# Patient Record
Sex: FEMALE | Race: WHITE | NOT HISPANIC OR LATINO | Employment: FULL TIME | URBAN - METROPOLITAN AREA
[De-identification: names, ages, dates, MRNs, and addresses within clinical notes are randomized per-mention and may not be internally consistent; named-entity substitution may affect disease eponyms.]

---

## 2017-03-16 ENCOUNTER — MYC REFILL (OUTPATIENT)
Dept: FAMILY MEDICINE | Facility: CLINIC | Age: 27
End: 2017-03-16

## 2017-03-16 DIAGNOSIS — F43.22 ADJUSTMENT DISORDER WITH ANXIOUS MOOD: ICD-10-CM

## 2017-03-17 NOTE — TELEPHONE ENCOUNTER
Message from VPIsystemst:  Original authorizing provider: Gris Gomez MD    Angelita Hobbs would like a refill of the following medications:  clonazePAM (KLONOPIN) 0.5 MG tablet [Gris Gomez MD]    Preferred pharmacy: [facility] - [address] AT [facility] OF [address]    Comment:  Hi Dr. Gomez -  requesting a refill of my clonazepam. It has been incredibly helpful in managing my anxiety. I have found myself using its less and less frequently, which is great! It's really made a positive impact on my quality of life. Thank you! - Angelita

## 2017-03-24 RX ORDER — CLONAZEPAM 0.5 MG/1
0.25 TABLET ORAL 2 TIMES DAILY PRN
Qty: 20 TABLET | Refills: 0 | Status: SHIPPED | OUTPATIENT
Start: 2017-03-24 | End: 2017-11-19

## 2017-08-03 ENCOUNTER — OFFICE VISIT (OUTPATIENT)
Dept: OBGYN | Facility: CLINIC | Age: 27
End: 2017-08-03
Payer: COMMERCIAL

## 2017-08-03 VITALS
HEIGHT: 69 IN | DIASTOLIC BLOOD PRESSURE: 85 MMHG | WEIGHT: 182 LBS | SYSTOLIC BLOOD PRESSURE: 138 MMHG | HEART RATE: 83 BPM | BODY MASS INDEX: 26.96 KG/M2

## 2017-08-03 DIAGNOSIS — Z23 ENCOUNTER FOR IMMUNIZATION: Primary | ICD-10-CM

## 2017-08-03 PROCEDURE — 90471 IMMUNIZATION ADMIN: CPT | Performed by: OBSTETRICS & GYNECOLOGY

## 2017-08-03 PROCEDURE — 90649 4VHPV VACCINE 3 DOSE IM: CPT | Performed by: OBSTETRICS & GYNECOLOGY

## 2017-08-03 NOTE — NURSING NOTE
"Initial /85 (BP Location: Left arm, Patient Position: Chair, Cuff Size: Adult Regular)  Pulse 83  Ht 5' 9\" (1.753 m)  Wt 182 lb (82.6 kg)  Breastfeeding? No  BMI 26.88 kg/m2 Estimated body mass index is 26.88 kg/(m^2) as calculated from the following:    Height as of this encounter: 5' 9\" (1.753 m).    Weight as of this encounter: 182 lb (82.6 kg). .    Cintia Davis    Prior to injection verified patient identity using patient's name and date of birth.  Per orders of Dr. Kee, injection of gardasil/hpv given by Cintia Davis. Patient instructed to remain in clinic for 15 minutes afterwards, and to report any adverse reaction to me immediately.  Cintia Davis    "

## 2017-08-03 NOTE — MR AVS SNAPSHOT
"              After Visit Summary   8/3/2017    Angelita Hobbs    MRN: [id number]           Patient Information     Date Of Birth          1990        Visit Information        Provider Department      8/3/2017 2:15 PM Vidhya Wilburn MD [facility]        Today's Diagnoses     Encounter for immunization    -  1       Follow-ups after your visit        Who to contact     If you have questions or need follow up information about today's clinic visit or your schedule please contact [facility] directly at [phone].  Normal or non-critical lab and imaging results will be communicated to you by [employer], letter or phone within 4 business days after the clinic has received the results. If you do not hear from us within 7 days, please contact the clinic through [employer] or phone. If you have a critical or abnormal lab result, we will notify you by phone as soon as possible.  Submit refill requests through avolution or call your pharmacy and they will forward the refill request to us. Please allow 3 business days for your refill to be completed.          Additional Information About Your Visit        MyChart Information     avolution gives you secure access to your electronic health record. If you see a primary care provider, you can also send messages to your care team and make appointments. If you have questions, please call your primary care clinic.  If you do not have a primary care provider, please call [phone] and they will assist you.        Care EveryWhere ID     This is your Care EveryWhere ID. This could be used by other organizations to access your [facility] medical records  [id number]        Your Vitals Were     Pulse Height Breastfeeding? BMI (Body Mass Index)          83 5' 9\" (1.753 m) No 26.88 kg/m2         Blood Pressure from Last 3 Encounters:   08/03/17 138/85   12/22/16 144/69   10/06/16 138/89    Weight from Last 3 Encounters:   08/03/17 182 lb (82.6 kg) "   12/22/16 176 lb (79.8 kg)   10/06/16 175 lb 11.2 oz (79.7 kg)              We Performed the Following     HUMAN PAPILLOMA VIRUS VACCINE     VACCINE ADMINISTRATION, INITIAL        Primary Care Provider    Doctor Unknown, MD       No address on file        Equal Access to Services     TARIQ HARRISON : Hadii chelsie covarrubias mary Soashlie, wahattieda luqadaha, qaybta kaalmada blank, trae hamiltonin hayaan hailyleif fernandez laNikunjestuardo knutson. So [facility] [phone].    ATENCIÓN: Si habla español, tiene a tolbert disposición servicios gratuitos de asistencia lingüística. Llame al [phone].    We comply with applicable federal civil rights laws and [place] laws. We do not discriminate on the basis of race, color, national origin, age, disability sex, sexual orientation or gender identity.            Thank you!     Thank you for choosing [facility]  for your care. Our goal is always to provide you with excellent care. Hearing back from our patients is one way we can continue to improve our services. Please take a few minutes to complete the written survey that you may receive in the mail after your visit with us. Thank you!             Your Updated Medication List - Protect others around you: Learn how to safely use, store and throw away your medicines at www.disposemymeds.org.          This list is accurate as of: 8/3/17  2:23 PM.  Always use your most recent med list.                   Brand Name Dispense Instructions for use Diagnosis    clonazePAM 0.5 MG tablet    klonoPIN    20 tablet    Take 0.5 tablets (0.25 mg) by mouth 2 times daily as needed for anxiety    Adjustment disorder with anxious mood       etonogestrel 68 MG Impl    IMPLANON/NEXPLANON     1 each by Subdermal route once        loratadine 10 MG tablet    CLARITIN     Take 10 mg by mouth daily        PROBIOTIC ACIDOPHILUS BIOBEADS Caps      Take 1 tablet by mouth daily

## 2017-08-03 NOTE — PROGRESS NOTES
HPV vaccination visit.     Ms. Angelita Hobbs 26 year old G0 presents to have her HPV vaccine administered.   She has had thus far 2 of the 3 series vaccination series.   Has no complaints at this time.     Past Medical History:   Diagnosis Date     Irregular menses      Menarche     age 15     Past Surgical History:   Procedure Laterality Date     HERNIA REPAIR, INGUINAL RT/LT  1996    Right     MOUTH SURGERY  2009    [place] teeth     Current Outpatient Prescriptions   Medication     clonazePAM (KLONOPIN) 0.5 MG tablet     Probiotic Product (PROBIOTIC ACIDOPHILUS) CAPS     etonogestrel (IMPLANON/NEXPLANON) 68 MG IMPL     loratadine (CLARITIN) 10 MG tablet     No current facility-administered medications for this visit.        A/P: HPV vaccination  -- reviewed that she can continue with completing series regardless of interval of last 2 injections of the vaccine series according the advisory committee on vaccination practices  -- return PRN or annual exam    Vidhya Wilburn MD  [facility]

## 2017-11-19 ENCOUNTER — MYC REFILL (OUTPATIENT)
Dept: FAMILY MEDICINE | Facility: CLINIC | Age: 27
End: 2017-11-19

## 2017-11-19 DIAGNOSIS — F43.22 ADJUSTMENT DISORDER WITH ANXIOUS MOOD: ICD-10-CM

## 2017-11-20 NOTE — TELEPHONE ENCOUNTER
Message from Clacendixhart:  Original authorizing provider: Gris Gomez MD    Angelita Hobbs would like a refill of the following medications:  clonazePAM (KLONOPIN) 0.5 MG tablet [Gris Gomez MD]    Preferred pharmacy: [address]    Comment:  Requesting a refill of clonazepam. Has continued to work very well for me. Happy to report I am using it less and less frequently.

## 2017-11-20 NOTE — TELEPHONE ENCOUNTER
Routing refill request to provider for review/approval because:  Drug not on the [facility] refill protocol     Rosemarie DUKE Rn

## 2017-11-21 RX ORDER — CLONAZEPAM 0.5 MG/1
0.25 TABLET ORAL 2 TIMES DAILY PRN
Qty: 20 TABLET | Refills: 0 | Status: SHIPPED | OUTPATIENT
Start: 2017-11-21 | End: 2018-06-14

## 2018-05-31 ENCOUNTER — MYC REFILL (OUTPATIENT)
Dept: FAMILY MEDICINE | Facility: CLINIC | Age: 28
End: 2018-05-31

## 2018-05-31 DIAGNOSIS — F43.22 ADJUSTMENT DISORDER WITH ANXIOUS MOOD: ICD-10-CM

## 2018-06-01 RX ORDER — CLONAZEPAM 0.5 MG/1
0.25 TABLET ORAL 2 TIMES DAILY PRN
Qty: 20 TABLET | Refills: 0 | OUTPATIENT
Start: 2018-06-01

## 2018-06-01 NOTE — TELEPHONE ENCOUNTER
Message from Orca Systemshart:  Original authorizing provider: Gris Gomez MD    Agnelita Hobbs would like a refill of the following medications:  clonazePAM (KLONOPIN) 0.5 MG tablet [Gris Gomez MD]    Preferred pharmacy: [facility] DR[facility] STORE [address] [facility] [address] AT [facility] OF [address]    Comment:  Requesting refill - wondering if I have to come in for a clinic visit before I can fill it?

## 2018-06-01 NOTE — TELEPHONE ENCOUNTER
Message from BioNitrogenhart:  Original authorizing provider: Gris Gomez MD    Angelita Hobbs would like a refill of the following medications:  clonazePAM (KLONOPIN) 0.5 MG tablet [Gris Gomez MD]    Preferred pharmacy: [facility] DR[facility] STORE [address] [facility] [address] AT [facility] OF [address]    Comment:

## 2018-06-04 NOTE — TELEPHONE ENCOUNTER
Left message to call back, CSS if patient returns, call please relay message needs to be seen for Klonopin refill.    STUART Lamb

## 2018-06-14 ENCOUNTER — OFFICE VISIT (OUTPATIENT)
Dept: FAMILY MEDICINE | Facility: CLINIC | Age: 28
End: 2018-06-14
Payer: COMMERCIAL

## 2018-06-14 VITALS
HEIGHT: 69 IN | RESPIRATION RATE: 16 BRPM | BODY MASS INDEX: 26.9 KG/M2 | WEIGHT: 181.6 LBS | SYSTOLIC BLOOD PRESSURE: 122 MMHG | HEART RATE: 76 BPM | DIASTOLIC BLOOD PRESSURE: 72 MMHG

## 2018-06-14 DIAGNOSIS — F43.22 ADJUSTMENT DISORDER WITH ANXIOUS MOOD: ICD-10-CM

## 2018-06-14 PROCEDURE — 99213 OFFICE O/P EST LOW 20 MIN: CPT | Performed by: FAMILY MEDICINE

## 2018-06-14 RX ORDER — CLONAZEPAM 0.5 MG/1
0.25 TABLET ORAL 2 TIMES DAILY PRN
Qty: 20 TABLET | Refills: 0 | Status: SHIPPED | OUTPATIENT
Start: 2018-06-14 | End: 2019-03-28

## 2018-06-14 ASSESSMENT — PAIN SCALES - GENERAL: PAINLEVEL: NO PAIN (0)

## 2018-06-14 NOTE — MR AVS SNAPSHOT
"              After Visit Summary   6/14/2018    Angelita Hobbs    MRN: [id number]           Patient Information     Date Of Birth          1990        Visit Information        Provider Department      6/14/2018 3:20 PM Gris Gomez MD [facility]        Today's Diagnoses     Adjustment disorder with anxious mood           Follow-ups after your visit        Who to contact     If you have questions or need follow up information about today's clinic visit or your schedule please contact [facility] directly at [phone].  Normal or non-critical lab and imaging results will be communicated to you by Be my eyeshart, letter or phone within 4 business days after the clinic has received the results. If you do not hear from us within 7 days, please contact the clinic through [employer] or phone. If you have a critical or abnormal lab result, we will notify you by phone as soon as possible.  Submit refill requests through Sarbari or call your pharmacy and they will forward the refill request to us. Please allow 3 business days for your refill to be completed.          Additional Information About Your Visit        MyChart Information     Sar[employer] gives you secure access to your electronic health record. If you see a primary care provider, you can also send messages to your care team and make appointments. If you have questions, please call your primary care clinic.  If you do not have a primary care provider, please call [phone] and they will assist you.        Care EveryWhere ID     This is your Care EveryWhere ID. This could be used by other organizations to access your [facility] medical records  [id number]        Your Vitals Were     Pulse Respirations Height Last Period BMI (Body Mass Index)       76 16 5' 9\" (1.753 m) 05/28/2018 (Exact Date) 26.82 kg/m2        Blood Pressure from Last 3 Encounters:   06/14/18 122/72   08/03/17 138/85   12/22/16 144/69    Weight from Last " 3 Encounters:   06/14/18 181 lb 9.6 oz (82.4 kg)   08/03/17 182 lb (82.6 kg)   12/22/16 176 lb (79.8 kg)              Today, you had the following     No orders found for display         Where to get your medicines      Some of these will need a paper prescription and others can be bought over the counter.  Ask your nurse if you have questions.     Bring a paper prescription for each of these medications     clonazePAM 0.5 MG tablet          Primary Care Provider Office Phone # Fax #    Gris Radha Gomez [phone] [address] [facility] [address]        Equal Access to Services     TARIQ HARRISON : Hadii chelsie Burdick, waaxda lualbertadaha, qaybta kaalmada blank, trae knutson. So [facility] [phone].    ATENCIÓN: Si habla español, tiene a tolbert disposición servicios gratuitos de asistencia lingüística. Llame al [phone].    We comply with applicable federal civil rights laws and [place] laws. We do not discriminate on the basis of race, color, national origin, age, disability, sex, sexual orientation, or gender identity.            Thank you!     Thank you for choosing [facility]  for your care. Our goal is always to provide you with excellent care. Hearing back from our patients is one way we can continue to improve our services. Please take a few minutes to complete the written survey that you may receive in the mail after your visit with us. Thank you!             Your Updated Medication List - Protect others around you: Learn how to safely use, store and throw away your medicines at www.disposemymeds.org.          This list is accurate as of 6/14/18  4:38 PM.  Always use your most recent med list.                   Brand Name Dispense Instructions for use Diagnosis    clonazePAM 0.5 MG tablet    klonoPIN    20 tablet    Take 0.5 tablets (0.25 mg) by mouth 2 times daily as needed for anxiety    Adjustment disorder with  anxious mood       etonogestrel 68 MG Impl    IMPLANON/NEXPLANON     1 each by Subdermal route once        loratadine 10 MG tablet    CLARITIN     Take 10 mg by mouth daily        PROBIOTIC ACIDOPHILUS BIOBEADS Caps      Take 1 tablet by mouth daily

## 2018-06-14 NOTE — PROGRESS NOTES
SUBJECTIVE:   Angelita Hobbs is a 27 year old female who presents to clinic today for the following health issues:      Medication Followup of  Clonazepam    Taking Medication as prescribed: yes    Side Effects:  None    Medication Helping Symptoms:  yes     ADDITIONAL HPI: 27 year old female here for above issue. Uses clonazepam infrequently for anxiety. #20 filled 11/21/2017. Needs refills.    ROS: 10 point review of systems negative except as per HPI.    PAST MEDICAL HISTORY:  Past Medical History:   Diagnosis Date     Irregular menses      Menarche     age 15        ACTIVE MEDICAL PROBLEMS:  Patient Active Problem List   Diagnosis     Secondary amenorrhea     Scanty or infrequent menstruation     Screening examination for venereal disease     Nexplanon insertion        FAMILY HISTORY:  Family History   Problem Relation Age of Onset     Cancer Maternal Grandfather      lung     HEART DISEASE Maternal Grandfather      valve replaced     Arthritis Paternal Grandfather      Cancer Paternal Grandfather      skin     Diabetes Paternal Grandmother      Anxiety Disorder Maternal Grandmother        SOCIAL HISTORY:  Social History     Social History     Marital status: Single     Spouse name: N/A     Number of children: 0     Years of education: N/A     Occupational History      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 No     Social History Narrative       MEDICATIONS:  Current Outpatient Prescriptions   Medication Sig Dispense Refill     clonazePAM (KLONOPIN) 0.5 MG tablet Take 0.5 tablets (0.25 mg) by mouth 2 times daily as needed for anxiety 20 tablet 0     etonogestrel (IMPLANON/NEXPLANON) 68 MG IMPL 1  "each by Subdermal route once       loratadine (CLARITIN) 10 MG tablet Take 10 mg by mouth daily       Probiotic Product (PROBIOTIC ACIDOPHILUS) CAPS Take 1 tablet by mouth daily         ALLERGIES:   No Known Allergies    Problem list, Medication list, Allergies, and Medical/Social/Surgical histories reviewed in [facility] and updated as appropriate.    OBJECTIVE:                                                    VITALS: /72 (BP Location: Right arm, Patient Position: Chair, Cuff Size: Adult Regular)  Pulse 76  Resp 16  Ht 5' 9\" (1.753 m)  Wt 181 lb 9.6 oz (82.4 kg)  LMP 05/28/2018 (Exact Date)  BMI 26.82 kg/m2 Body mass index is 26.82 kg/(m^2).  GENERAL: Pleasant, well appearing female.  PSYCH: Alert and oriented x3, neatly dressed and well groomed, makes good eye contact, fluid speech - non-pressured, no psychomotor agitation or slowing, good memory, judgement and insight, no auditory or visual hallucinations, bright affect which is mood congruent.     ASSESSMENT/PLAN:                                                    1. Adjustment disorder with anxious mood  Well controlled. Refilled medication.     - clonazePAM (KLONOPIN) 0.5 MG tablet; Take 0.5 tablets (0.25 mg) by mouth 2 times daily as needed for anxiety  Dispense: 20 tablet; Refill: 0       "

## 2018-06-28 ENCOUNTER — MYC REFILL (OUTPATIENT)
Dept: FAMILY MEDICINE | Facility: CLINIC | Age: 28
End: 2018-06-28

## 2018-06-28 DIAGNOSIS — F43.22 ADJUSTMENT DISORDER WITH ANXIOUS MOOD: ICD-10-CM

## 2018-06-28 RX ORDER — CLONAZEPAM 0.5 MG/1
0.25 TABLET ORAL 2 TIMES DAILY PRN
Qty: 20 TABLET | Refills: 0 | Status: CANCELLED | OUTPATIENT
Start: 2018-06-28

## 2018-06-28 NOTE — TELEPHONE ENCOUNTER
Message from Wizerhart:  Original authorizing provider: Gris Gomez MD    Angelita Hobbs would like a refill of the following medications:  clonazePAM (KLONOPIN) 0.5 MG tablet [Gris Gomez MD]    Preferred pharmacy: [facility] [address]    Comment:

## 2019-02-21 ENCOUNTER — OFFICE VISIT (OUTPATIENT)
Dept: FAMILY MEDICINE | Facility: CLINIC | Age: 29
End: 2019-02-21
Payer: COMMERCIAL

## 2019-02-21 VITALS
TEMPERATURE: 98.4 F | WEIGHT: 177 LBS | HEIGHT: 69 IN | HEART RATE: 64 BPM | BODY MASS INDEX: 26.22 KG/M2 | SYSTOLIC BLOOD PRESSURE: 130 MMHG | DIASTOLIC BLOOD PRESSURE: 62 MMHG

## 2019-02-21 DIAGNOSIS — L08.9 INFECTED SEBACEOUS CYST: Primary | ICD-10-CM

## 2019-02-21 DIAGNOSIS — L72.3 INFECTED SEBACEOUS CYST: Primary | ICD-10-CM

## 2019-02-21 PROCEDURE — 87070 CULTURE OTHR SPECIMN AEROBIC: CPT | Performed by: FAMILY MEDICINE

## 2019-02-21 PROCEDURE — 87077 CULTURE AEROBIC IDENTIFY: CPT | Performed by: FAMILY MEDICINE

## 2019-02-21 PROCEDURE — 99213 OFFICE O/P EST LOW 20 MIN: CPT | Mod: 25 | Performed by: FAMILY MEDICINE

## 2019-02-21 PROCEDURE — 87186 SC STD MICRODIL/AGAR DIL: CPT | Performed by: FAMILY MEDICINE

## 2019-02-21 PROCEDURE — 10060 I&D ABSCESS SIMPLE/SINGLE: CPT | Performed by: FAMILY MEDICINE

## 2019-02-21 RX ORDER — DOXYCYCLINE 100 MG/1
100 CAPSULE ORAL 2 TIMES DAILY
Qty: 20 CAPSULE | Refills: 0 | Status: SHIPPED | OUTPATIENT
Start: 2019-02-21 | End: 2019-03-28

## 2019-02-21 RX ORDER — CLONAZEPAM 0.5 MG/1
0.25 TABLET ORAL 2 TIMES DAILY PRN
Qty: 20 TABLET | Refills: 0 | Status: CANCELLED | OUTPATIENT
Start: 2019-02-21

## 2019-02-21 ASSESSMENT — ENCOUNTER SYMPTOMS
SKIN CHANGES: 0
POOR WOUND HEALING: 0
TASTE DISTURBANCE: 0
TROUBLE SWALLOWING: 0
SMELL DISTURBANCE: 0
NECK MASS: 0
SINUS CONGESTION: 1
NAIL CHANGES: 0
HOARSE VOICE: 0
SORE THROAT: 0
SINUS PAIN: 0

## 2019-02-21 ASSESSMENT — MIFFLIN-ST. JEOR: SCORE: 1602.25

## 2019-02-21 ASSESSMENT — PAIN SCALES - GENERAL: PAINLEVEL: MODERATE PAIN (4)

## 2019-02-21 NOTE — PROGRESS NOTES
"  SUBJECTIVE:   Angelita Hobbs is a 28 year old female who presents to clinic today for the following health issues:    Rash  Onset: 1 week ago, Patient noticed a raised bump on right buttock. It is irritated and painful. It has grown in size.      Description:   Location: Right buttock   Character: round, raised, painful, red  Itching (Pruritis): no     Progression of Symptoms:  worsening    Accompanying Signs & Symptoms:  Fever: no   Body aches or joint pain: no   Sore throat symptoms: no   Recent cold symptoms: YES    History:   Previous similar rash: YES- 1 year ago patient had same derm problem, it started as a pea sized bump, then it got red, sore and burst. It healed on its own.     Precipitating factors:   Exposure to similar rash: no, patient works at hospital  New exposures: None   Recent travel: Patient went to [place] 1 week ago    Alleviating factors:  Advil, antibiotic cream    Therapies Tried and outcome: Advil helps with the pain.     She didn't have antibiotics for the lesion last year.  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  She has not had MRSA in the past.        Problem list and histories reviewed & adjusted, as indicated.  Additional history: as documented    BP Readings from Last 3 Encounters:   02/21/19 130/62   06/14/18 122/72   08/03/17 138/85    Wt Readings from Last 3 Encounters:   02/21/19 80.3 kg (177 lb)   06/14/18 82.4 kg (181 lb 9.6 oz)   08/03/17 82.6 kg (182 lb)                    Reviewed and updated as needed this visit by clinical staff  Tobacco  Allergies  Meds  Med Hx  Surg Hx  Fam Hx  Soc Hx      Reviewed and updated as needed this visit by Provider         ROS:  Constitutional, HEENT, cardiovascular, pulmonary, gi and gu systems are negative, except as otherwise noted.    OBJECTIVE:     /62 (BP Location: Right arm, Patient Position: Chair, Cuff Size: Adult Regular)   Pulse 64   Temp 98.4  F (36.9  C) (Oral)   Ht 1.753 m (5' 9\")   Wt 80.3 kg (177 lb)   " Breastfeeding? No   BMI 26.14 kg/m    Body mass index is 26.14 kg/m .  GENERAL: healthy, alert and no distress  MS: no gross musculoskeletal defects noted, no edema  SKIN: abscess on right buttocks   PSYCH: mentation appears normal, affect normal/bright    PROCEDURE: I and D     The area was prepped with alcohol  Injected with 0.5 ml of lidocaine with epi for anesthesia   Using an 11 blade, an incision was placed.   The abcess was then drained  Wound culture taken  Pt tolerated procedure well without complications. EBL was minimal.      Diagnostic Test Results:  none     ASSESSMENT/PLAN:     ASSESSMENT/PLAN:      ICD-10-CM    1. Infected sebaceous cyst L72.3 Wound Culture Aerobic Bacterial    L08.9 GENERAL SURG ADULT REFERRAL     doxycycline hyclate (VIBRAMYCIN) 100 MG capsule     Wound Culture Aerobic Bacterial     DRAIN SKIN ABSCESS SIMPLE/SINGLE     Will cover for MRSA as she works in the hospital.  Deep sebaceous cyst will need to be removed so will refer to general surgery for this.  Culture taken     Marcella White DO  [facility]

## 2019-02-22 ENCOUNTER — DOCUMENTATION ONLY (OUTPATIENT)
Dept: SURGERY | Facility: CLINIC | Age: 29
End: 2019-02-22

## 2019-02-22 ENCOUNTER — OFFICE VISIT (OUTPATIENT)
Dept: SURGERY | Facility: CLINIC | Age: 29
End: 2019-02-22
Attending: FAMILY MEDICINE
Payer: COMMERCIAL

## 2019-02-22 VITALS
WEIGHT: 176.3 LBS | SYSTOLIC BLOOD PRESSURE: 132 MMHG | OXYGEN SATURATION: 100 % | TEMPERATURE: 97.6 F | DIASTOLIC BLOOD PRESSURE: 82 MMHG | HEIGHT: 69 IN | BODY MASS INDEX: 26.11 KG/M2 | HEART RATE: 78 BPM

## 2019-02-22 DIAGNOSIS — M79.89 SOFT TISSUE MASS: Primary | ICD-10-CM

## 2019-02-22 ASSESSMENT — PAIN SCALES - GENERAL: PAINLEVEL: NO PAIN (0)

## 2019-02-22 ASSESSMENT — MIFFLIN-ST. JEOR: SCORE: 1599.07

## 2019-02-22 NOTE — PROGRESS NOTES
Patient is scheduled for surgery with Dr. Carmelina Barr      Spoke with: Angelita Hobbs in clinic about surgery dates    Date of Surgery: 03/07/2019 at 9:00 AM with Dr. Carmelina Barr    H&P: Patient told this writer that she was not required to schedule one per a conversation she had with Dr. Barr.      Informed patient they will need an adult : YES    Surgery packet sent: Given to patient in clinic    Additional comments: She also knows to call general surgery if she experiences any cold or flu symptoms. Surgery teaching and soap were given to in clinic on 02/22/2019. He was asked to call [phone] if he needs to reschedule and [phone] if he experiences any symptoms.

## 2019-02-22 NOTE — NURSING NOTE
Pre and Post op Patient Education/Teaching Flowsheet  Relevant Diagnosis:  Soft tissue mass  Teaching Topic:  Pre and post op teaching  Person(s) Involved in teaching:  Patient     Motivation Level:  Asks Questions:  Yes  Eager to Learn:  Yes  Cooperative:  Yes  Receptive (willing/able to accept information):  Yes  Any cultural factors/Caodaism beliefs that may influence understanding or compliance?  No    Patient/caregiver/family demonstrates understanding of the following:  Reason for the appointment, diagnosis, and treatment plan:  Yes  Patient demonstrates understanding of the following:  Pre-op bowel prep:  No  Post-op pain management recommendations (medications, ice compress, binder/athletic supporter (if applicable), etc.:  Yes  Inguinal hernia patients:  Post-op urinary retention- discussed signs/symptoms and visit to ER for [place] catheter placement and to stay in place for at least 48 hours:  NA  Restrictions:  Yes  Medications to take the day of surgery:  Per PCP  Blood thinner medications discussed and when to stop (if applicable):  Yes  Wound care:  Yes  Diabetes medication management (if applicable):  Per PCP  Which situations necessitate calling provider and whom to contact:  Discussed how to contact the hospital, nurse, and clinic scheduling staff if necessary      Date and time of surgery:  Yes  Location of surgery: [facility] [place]- 5th Floor  History and Physical and any other testing necessary prior to surgery:  Yes. Dr. Barr will update the day of surgery.  Required time line for completion of History and Physical and any pre-op testing:  Yes  Discuss need for someone to drive patient home and stay with them for 24 hours:  Yes  Pre-op showering/scrub information with Surgical Scrub:  Yes  NPO Guidelines:  NPO per Anesthesia Guidelines    Infection Prevention: Patient demonstrates understanding of the following:  Patient instructed on hand hygiene:   Yes  Surgical procedure site care will be taught and will be reviewed at the time of discharge  Signs and symptoms of infection taught:  Yes  Wound care reviewed and will be taught at the time of discharge  Central venous catheter care will be taught at the time of discharge (if applicable)    Post-op follow-up:  Instructional materials used/given/mailed:  [facility] Booklet, post op teaching sheet, Map, Soap, and arrival/location information    Surgical instructions mailed to patient

## 2019-02-22 NOTE — PATIENT INSTRUCTIONS
You met with Dr. Carmelina Barr.      Today's visit instructions:        If you have questions please contact Jitendra RN or Magnolia RN during regular clinic hours, Monday through Friday 7:30 AM - 4:00 PM, or you can contact us via [employer] at anytime.       If you have urgent needs after-hours, weekends, or holidays please call the hospital at [phone] and ask to speak with our on-call General Surgery Team.    Appointment [phone], option #1   Nurse Advice (Jitendra or Magnolia): [phone]   Surgery Scheduler (Marquita): [phone]  Fax: [phone]      After Your Mass/Lump Removal       Incision care     You may take a shower the day after surgery. Carefully wash your incision with soap and water. Do not submerge yourself in water (bath, whirlpool, hot tub, pool, lake) for 14 days after surgery.     Remove the gauze bandage 24 hours after surgery, but leave the medical tape (Steri-Strips) or glue in place. These will loosen and fall off on their own 5 to 7 days after surgery.       Always wash your hands before touching your incisions or removing bandages.     It is not unusual to form a collection of fluid or blood under your incision that may feel firm or squishy- it can take several weeks to months for your body to reabsorb it.  At times, it may even drain.  If that should happen keep the area clean with soap, water,  and cover with a clean gauze dressing. You can change this daily or as needed.     Other medicines     Wait to start aspirin or blood thinners until the day after surgery. You can continue your regular medicines at your normal time the day after surgery.     Your pain medicine may cause constipation (hard, dry stools). To help with this, take the stool softener your doctor gave you or an over-the-counter stool softener or laxative. You can stop taking this when you are no longer taking pain medicine and your bowel movements are back to normal.      For pain or discomfort     Take  the narcotic pain medicine your doctor gave you as needed and as instructed on the bottle. If you prefer to use over-the-counter medication, use acetaminophen (Tylenol) or ibuprofen (Advil, Motrin) as instructed on the box. Do not take Tylenol if it is in your narcotic pain medication.      Use an ice pack on your surgical cut (incision) for 20 minutes at a time as needed for the first 24 hours. Be sure to protect your skin by putting a cloth between the ice pack and your skin.      Activities   No driving until you feel it s safe to do so. Don t drive while taking narcotic pain medicine.      Diet   You can eat your regular meals after surgery.      Results   You should know any test results about 5 to 7 business days after surgery       When to call the doctor   Call your doctor if you have:     A fever above 101 F (38.3 C) (taken under the tongue), or a fever or chills lasting more than a day.     Redness at the incision site.     Any fluid or blood draining from the incision, especially if it smells bad.      Severe pain that doesn t improve with pain medicine.      We will call you 2 to 4 days after surgery to review this handout, answer questions and help arrange after-surgery care. If you have questions or concerns, please call [phone] during regular office hours. If you need to call after business hours, call [phone] and ask to page the surgeon on-call.

## 2019-02-22 NOTE — LETTER
2/22/2019       RE: Angelita Hobbs  [address]  [facility] [address]     Dear Colleague,    Thank you for referring your patient, Angelita Hobbs, to the [facility] at [facility]. Please see a copy of my visit note below.    General Surgery Clinic Note - New Patient Visit    NAME: Angelita Hobbs,  28 year old female    PCP: Gris Gomez  MRN:   [id number]      Ph#: [phone]  Date: Feb 22, 2019    Chief Complaint: open wound on buttock    History of Present Illness: Angelita Hobbs is a 28 year old female who presents to the clinic with an infected boil on her buttock that was drained by the PCP.  She has been on antibiotics and is getting better.     Past Medical History: History in Epic reviewed with the patient:  Past Medical History:   Diagnosis Date     Irregular menses      Menarche     age 15       Past Surgical History: History in Epic reviewed with the patient:  Past Surgical History:   Procedure Laterality Date     HERNIA REPAIR, INGUINAL RT/LT  1996    Right     MOUTH SURGERY  2009    [place] teeth       Family History: History in Epic reviewed with the patient:  Family History   Problem Relation Age of Onset     Cancer Maternal Grandfather         lung     Heart Disease Maternal Grandfather         valve replaced     Arthritis Paternal Grandfather      Cancer Paternal Grandfather         skin     Diabetes Paternal Grandmother      Anxiety Disorder Maternal Grandmother        Social History: History in Epic reviewed with the patient:  Social History     Socioeconomic History     Marital status: Single     Spouse name: Not on file     Number of children: 0     Years of education: Not on file     Highest education level: Not on file   Occupational History     Employer: STUDENT     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   Social Needs     Financial resource strain: Not on file     Food insecurity:     Worry: Not on file     " Inability: Not on file     Transportation needs:     Medical: Not on file     Non-medical: Not on file   Tobacco Use     Smoking status: Never Smoker     Smokeless tobacco: Never Used   Substance and Sexual Activity     Alcohol use: Yes     Drug use: No     Sexual activity: Yes     Partners: Male     Birth control/protection: Implant   Lifestyle     Physical activity:     Days per week: Not on file     Minutes per session: Not on file     Stress: Not on file   Relationships     Social connections:     Talks on phone: Not on file     Gets together: Not on file     Attends Worship service: Not on file     Active member of club or organization: Not on file     Attends meetings of clubs or organizations: Not on file     Relationship status: Not on file     Intimate partner violence:     Fear of current or ex partner: Not on file     Emotionally abused: Not on file     Physically abused: Not on file     Forced sexual activity: Not on file   Other Topics Concern     Parent/sibling w/ CABG, MI or angioplasty before 65F 55M? No   Social History Narrative     Not on file       Allergies:   No Known Allergies    Outpatient Medications:  Outpatient Encounter Medications as of 2/22/2019   Medication Sig Dispense Refill     clonazePAM (KLONOPIN) 0.5 MG tablet Take 0.5 tablets (0.25 mg) by mouth 2 times daily as needed for anxiety 20 tablet 0     doxycycline hyclate (VIBRAMYCIN) 100 MG capsule Take 1 capsule (100 mg) by mouth 2 times daily for 10 days 20 capsule 0     etonogestrel (IMPLANON/NEXPLANON) 68 MG IMPL 1 each by Subdermal route once       loratadine (CLARITIN) 10 MG tablet Take 10 mg by mouth as needed        No facility-administered encounter medications on file as of 2/22/2019.        ROS: 10 systems reviewed and all are negative except as above.    EXAM:  /82   Pulse 78   Temp 97.6  F (36.4  C) (Oral)   Ht 1.753 m (5' 9\")   Wt 80 kg (176 lb 4.8 oz)   SpO2 100%   BMI 26.03 kg/m     Psych: Normal " affect  Neuro: No gross focal deficits noted  Head:Normocephalic, atraumatic  Eyes: Non icteric  Neck:supple  Heart: Regular rate and rhythm  Lungs: CTAB non-labored, quiet respiration  Abdomen: soft  Extremities: drained wound on the right buttock.  No cellulitis    A/P: Angelita Hobbs is a 28 year old female with an drained infected wound on her right buttock.  We discussed that these can be connected internally to the rectal vault.  I will explore a little bit at excision to look for any connection.  Excision is recommended.  Risks, benefits and alternatives were discussed and the patient agrees to proceed.  All questions were answered.    Carmelina Barr MD FACS  Associate Professor of Surgery  Pager [phone]

## 2019-02-22 NOTE — NURSING NOTE
"Chief Complaint   Patient presents with     Consult     Infected Sebaceous Cyst, right buttock       Vitals:    02/22/19 1222   BP: 132/82   Pulse: 78   Temp: 97.6  F (36.4  C)   TempSrc: Oral   SpO2: 100%   Weight: 80 kg (176 lb 4.8 oz)   Height: 1.753 m (5' 9\")       Body mass index is 26.03 kg/m .      Luís Victoria, EMT on 2/22/2019 at 12:26 PM                          "

## 2019-02-22 NOTE — PROGRESS NOTES
General Surgery Clinic Note - New Patient Visit    NAME: Angelita Hobbs,  28 year old female    PCP: Gris Gomez  MRN:   [id number]      #: [phone]  Date: Feb 22, 2019    Chief Complaint: open wound on buttock    History of Present Illness: Angelita Hobbs is a 28 year old female who presents to the clinic with an infected boil on her buttock that was drained by the PCP.  She has been on antibiotics and is getting better.     Past Medical History: History in Epic reviewed with the patient:  Past Medical History:   Diagnosis Date     Irregular menses      Menarche     age 15       Past Surgical History: History in Epic reviewed with the patient:  Past Surgical History:   Procedure Laterality Date     HERNIA REPAIR, INGUINAL RT/LT  1996    Right     MOUTH SURGERY  2009    [place] teeth       Family History: History in Epic reviewed with the patient:  Family History   Problem Relation Age of Onset     Cancer Maternal Grandfather         lung     Heart Disease Maternal Grandfather         valve replaced     Arthritis Paternal Grandfather      Cancer Paternal Grandfather         skin     Diabetes Paternal Grandmother      Anxiety Disorder Maternal Grandmother        Social History: History in Epic reviewed with the patient:  Social History     Socioeconomic History     Marital status: Single     Spouse name: Not on file     Number of children: 0     Years of education: Not on file     Highest education level: Not on file   Occupational History     Employer: STUDENT     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   Social Needs     Financial resource strain: Not on file     Food insecurity:     Worry: Not on file     Inability: Not on file     Transportation needs:     Medical: Not on file     Non-medical: Not on file   Tobacco Use     Smoking status: Never Smoker     Smokeless tobacco: Never Used   Substance and Sexual Activity     Alcohol use: Yes     Drug use: No     Sexual activity: Yes      "Partners: Male     Birth control/protection: Implant   Lifestyle     Physical activity:     Days per week: Not on file     Minutes per session: Not on file     Stress: Not on file   Relationships     Social connections:     Talks on phone: Not on file     Gets together: Not on file     Attends Episcopalian service: Not on file     Active member of club or organization: Not on file     Attends meetings of clubs or organizations: Not on file     Relationship status: Not on file     Intimate partner violence:     Fear of current or ex partner: Not on file     Emotionally abused: Not on file     Physically abused: Not on file     Forced sexual activity: Not on file   Other Topics Concern     Parent/sibling w/ CABG, MI or angioplasty before 65F 55M? No   Social History Narrative     Not on file       Allergies:   No Known Allergies    Outpatient Medications:  Outpatient Encounter Medications as of 2/22/2019   Medication Sig Dispense Refill     clonazePAM (KLONOPIN) 0.5 MG tablet Take 0.5 tablets (0.25 mg) by mouth 2 times daily as needed for anxiety 20 tablet 0     doxycycline hyclate (VIBRAMYCIN) 100 MG capsule Take 1 capsule (100 mg) by mouth 2 times daily for 10 days 20 capsule 0     etonogestrel (IMPLANON/NEXPLANON) 68 MG IMPL 1 each by Subdermal route once       loratadine (CLARITIN) 10 MG tablet Take 10 mg by mouth as needed        No facility-administered encounter medications on file as of 2/22/2019.        ROS: 10 systems reviewed and all are negative except as above.    EXAM:  /82   Pulse 78   Temp 97.6  F (36.4  C) (Oral)   Ht 1.753 m (5' 9\")   Wt 80 kg (176 lb 4.8 oz)   SpO2 100%   BMI 26.03 kg/m    Psych: Normal affect  Neuro: No gross focal deficits noted  Head:Normocephalic, atraumatic  Eyes: Non icteric  Neck:supple  Heart: Regular rate and rhythm  Lungs: CTAB non-labored, quiet respiration  Abdomen: soft  Extremities: drained wound on the right buttock.  No cellulitis    A/P: Angelita Faby is a 28 " year old female with an drained infected wound on her right buttock.  We discussed that these can be connected internally to the rectal vault.  I will explore a little bit at excision to look for any connection.  Excision is recommended.  Risks, benefits and alternatives were discussed and the patient agrees to proceed.  All questions were answered.      Carmelina Barr MD FACS  Associate Professor of Surgery  Pager [phone]     Answers for HPI/ROS submitted by the patient on 2/21/2019   General Symptoms: No  Skin Symptoms: Yes  HENT Symptoms: Yes  EYE SYMPTOMS: No  HEART SYMPTOMS: No  LUNG SYMPTOMS: No  INTESTINAL SYMPTOMS: No  URINARY SYMPTOMS: No  GYNECOLOGIC SYMPTOMS: No  BREAST SYMPTOMS: No  SKELETAL SYMPTOMS: No  BLOOD SYMPTOMS: No  NERVOUS SYSTEM SYMPTOMS: No  MENTAL HEALTH SYMPTOMS: No  Changes in hair: No  Changes in moles/birth marks: No  Itching: No  Rashes: No  Changes in nails: No  Acne: No  Hair in places you don't want it: No  Change in facial hair: No  Warts: No  Non-healing sores: No  Scarring: No  Flaking of skin: No  Color changes of hands/feet in cold : No  Sun sensitivity: No  Skin thickening: No  Ear pain: No  Ear discharge: No  Hearing loss: No  Tinnitus: No  Nosebleeds: No  Congestion: Yes  Sinus pain: No  Trouble swallowing: No   Voice hoarseness: No  Mouth sores: No  Sore throat: No  Tooth pain: No  Gum tenderness: No  Bleeding gums: No  Change in taste: No  Change in sense of smell: No  Dry mouth: No  Hearing aid used: No  Neck lump: No

## 2019-02-23 ENCOUNTER — MYC MEDICAL ADVICE (OUTPATIENT)
Dept: FAMILY MEDICINE | Facility: CLINIC | Age: 29
End: 2019-02-23

## 2019-02-23 DIAGNOSIS — K65.1 ABSCESS OF PERITONEUM (H): Primary | ICD-10-CM

## 2019-02-23 LAB
BACTERIA SPEC CULT: ABNORMAL
Lab: ABNORMAL
SPECIMEN SOURCE: ABNORMAL

## 2019-02-24 ENCOUNTER — MYC MEDICAL ADVICE (OUTPATIENT)
Dept: FAMILY MEDICINE | Facility: CLINIC | Age: 29
End: 2019-02-24

## 2019-02-25 RX ORDER — AMOXICILLIN 500 MG/1
500 CAPSULE ORAL 3 TIMES DAILY
Qty: 30 CAPSULE | Refills: 0 | Status: SHIPPED | OUTPATIENT
Start: 2019-02-25 | End: 2019-03-28

## 2019-02-25 NOTE — TELEPHONE ENCOUNTER
Route MyChart to provider to advise please.  Would you like to change antibiotics given patient's symptoms?     MyChart sent.  Elisa Grider RN

## 2019-02-25 NOTE — TELEPHONE ENCOUNTER
This is a duplicate encounter.  She had sent another for the same issue, which has been responded to and sent to provider. Will close this one.    Elisa Grider RN

## 2019-03-06 ENCOUNTER — ANESTHESIA EVENT (OUTPATIENT)
Dept: SURGERY | Facility: AMBULATORY SURGERY CENTER | Age: 29
End: 2019-03-06

## 2019-03-07 ENCOUNTER — HOSPITAL ENCOUNTER (OUTPATIENT)
Facility: AMBULATORY SURGERY CENTER | Age: 29
End: 2019-03-07
Attending: SURGERY
Payer: COMMERCIAL

## 2019-03-07 ENCOUNTER — ANESTHESIA (OUTPATIENT)
Dept: SURGERY | Facility: AMBULATORY SURGERY CENTER | Age: 29
End: 2019-03-07

## 2019-03-07 VITALS
DIASTOLIC BLOOD PRESSURE: 70 MMHG | OXYGEN SATURATION: 98 % | WEIGHT: 176 LBS | HEART RATE: 95 BPM | RESPIRATION RATE: 12 BRPM | SYSTOLIC BLOOD PRESSURE: 125 MMHG | TEMPERATURE: 98.2 F | HEIGHT: 69 IN | BODY MASS INDEX: 26.07 KG/M2

## 2019-03-07 LAB
HCG UR QL: NEGATIVE
INTERNAL QC OK POCT: YES

## 2019-03-07 RX ORDER — ACETAMINOPHEN 325 MG/1
975 TABLET ORAL ONCE
Status: COMPLETED | OUTPATIENT
Start: 2019-03-07 | End: 2019-03-07

## 2019-03-07 RX ORDER — NALOXONE HYDROCHLORIDE 0.4 MG/ML
.1-.4 INJECTION, SOLUTION INTRAMUSCULAR; INTRAVENOUS; SUBCUTANEOUS
Status: DISCONTINUED | OUTPATIENT
Start: 2019-03-07 | End: 2019-03-08 | Stop reason: HOSPADM

## 2019-03-07 RX ORDER — ONDANSETRON 2 MG/ML
4 INJECTION INTRAMUSCULAR; INTRAVENOUS EVERY 30 MIN PRN
Status: DISCONTINUED | OUTPATIENT
Start: 2019-03-07 | End: 2019-03-08 | Stop reason: HOSPADM

## 2019-03-07 RX ORDER — SODIUM CHLORIDE, SODIUM LACTATE, POTASSIUM CHLORIDE, CALCIUM CHLORIDE 600; 310; 30; 20 MG/100ML; MG/100ML; MG/100ML; MG/100ML
INJECTION, SOLUTION INTRAVENOUS CONTINUOUS
Status: DISCONTINUED | OUTPATIENT
Start: 2019-03-07 | End: 2019-03-07 | Stop reason: HOSPADM

## 2019-03-07 RX ORDER — HYDROCODONE BITARTRATE AND ACETAMINOPHEN 5; 325 MG/1; MG/1
1 TABLET ORAL
Status: DISCONTINUED | OUTPATIENT
Start: 2019-03-07 | End: 2019-03-08 | Stop reason: HOSPADM

## 2019-03-07 RX ORDER — LIDOCAINE 40 MG/G
CREAM TOPICAL
Status: DISCONTINUED | OUTPATIENT
Start: 2019-03-07 | End: 2019-03-07 | Stop reason: HOSPADM

## 2019-03-07 RX ORDER — PROPOFOL 10 MG/ML
INJECTION, EMULSION INTRAVENOUS CONTINUOUS PRN
Status: DISCONTINUED | OUTPATIENT
Start: 2019-03-07 | End: 2019-03-07

## 2019-03-07 RX ORDER — ONDANSETRON 2 MG/ML
INJECTION INTRAMUSCULAR; INTRAVENOUS PRN
Status: DISCONTINUED | OUTPATIENT
Start: 2019-03-07 | End: 2019-03-07

## 2019-03-07 RX ORDER — KETAMINE HYDROCHLORIDE 10 MG/ML
INJECTION, SOLUTION INTRAMUSCULAR; INTRAVENOUS PRN
Status: DISCONTINUED | OUTPATIENT
Start: 2019-03-07 | End: 2019-03-07

## 2019-03-07 RX ORDER — ONDANSETRON 4 MG/1
4 TABLET, ORALLY DISINTEGRATING ORAL EVERY 30 MIN PRN
Status: DISCONTINUED | OUTPATIENT
Start: 2019-03-07 | End: 2019-03-08 | Stop reason: HOSPADM

## 2019-03-07 RX ORDER — OXYCODONE HYDROCHLORIDE 5 MG/1
5 TABLET ORAL EVERY 4 HOURS PRN
Status: DISCONTINUED | OUTPATIENT
Start: 2019-03-07 | End: 2019-03-08 | Stop reason: HOSPADM

## 2019-03-07 RX ORDER — PROPOFOL 10 MG/ML
INJECTION, EMULSION INTRAVENOUS PRN
Status: DISCONTINUED | OUTPATIENT
Start: 2019-03-07 | End: 2019-03-07

## 2019-03-07 RX ORDER — GABAPENTIN 300 MG/1
300 CAPSULE ORAL ONCE
Status: COMPLETED | OUTPATIENT
Start: 2019-03-07 | End: 2019-03-07

## 2019-03-07 RX ORDER — MEPERIDINE HYDROCHLORIDE 25 MG/ML
12.5 INJECTION INTRAMUSCULAR; INTRAVENOUS; SUBCUTANEOUS
Status: DISCONTINUED | OUTPATIENT
Start: 2019-03-07 | End: 2019-03-08 | Stop reason: HOSPADM

## 2019-03-07 RX ORDER — FENTANYL CITRATE 50 UG/ML
25-50 INJECTION, SOLUTION INTRAMUSCULAR; INTRAVENOUS
Status: DISCONTINUED | OUTPATIENT
Start: 2019-03-07 | End: 2019-03-07 | Stop reason: HOSPADM

## 2019-03-07 RX ORDER — KETOROLAC TROMETHAMINE 30 MG/ML
INJECTION, SOLUTION INTRAMUSCULAR; INTRAVENOUS PRN
Status: DISCONTINUED | OUTPATIENT
Start: 2019-03-07 | End: 2019-03-07

## 2019-03-07 RX ORDER — LIDOCAINE HYDROCHLORIDE 20 MG/ML
INJECTION, SOLUTION INFILTRATION; PERINEURAL PRN
Status: DISCONTINUED | OUTPATIENT
Start: 2019-03-07 | End: 2019-03-07

## 2019-03-07 RX ORDER — FENTANYL CITRATE 50 UG/ML
INJECTION, SOLUTION INTRAMUSCULAR; INTRAVENOUS PRN
Status: DISCONTINUED | OUTPATIENT
Start: 2019-03-07 | End: 2019-03-07

## 2019-03-07 RX ORDER — SODIUM CHLORIDE, SODIUM LACTATE, POTASSIUM CHLORIDE, CALCIUM CHLORIDE 600; 310; 30; 20 MG/100ML; MG/100ML; MG/100ML; MG/100ML
INJECTION, SOLUTION INTRAVENOUS CONTINUOUS
Status: DISCONTINUED | OUTPATIENT
Start: 2019-03-07 | End: 2019-03-08 | Stop reason: HOSPADM

## 2019-03-07 RX ADMIN — ACETAMINOPHEN 975 MG: 325 TABLET ORAL at 07:53

## 2019-03-07 RX ADMIN — KETAMINE HYDROCHLORIDE 20 MG: 10 INJECTION, SOLUTION INTRAMUSCULAR; INTRAVENOUS at 09:50

## 2019-03-07 RX ADMIN — PROPOFOL 20 MG: 10 INJECTION, EMULSION INTRAVENOUS at 10:08

## 2019-03-07 RX ADMIN — GABAPENTIN 300 MG: 300 CAPSULE ORAL at 07:53

## 2019-03-07 RX ADMIN — SODIUM CHLORIDE, SODIUM LACTATE, POTASSIUM CHLORIDE, CALCIUM CHLORIDE: 600; 310; 30; 20 INJECTION, SOLUTION INTRAVENOUS at 07:56

## 2019-03-07 RX ADMIN — ONDANSETRON 4 MG: 2 INJECTION INTRAMUSCULAR; INTRAVENOUS at 09:37

## 2019-03-07 RX ADMIN — KETOROLAC TROMETHAMINE 30 MG: 30 INJECTION, SOLUTION INTRAMUSCULAR; INTRAVENOUS at 10:11

## 2019-03-07 RX ADMIN — PROPOFOL 30 MG: 10 INJECTION, EMULSION INTRAVENOUS at 09:41

## 2019-03-07 RX ADMIN — FENTANYL CITRATE 50 MCG: 50 INJECTION, SOLUTION INTRAMUSCULAR; INTRAVENOUS at 09:37

## 2019-03-07 RX ADMIN — PROPOFOL 100 MCG/KG/MIN: 10 INJECTION, EMULSION INTRAVENOUS at 09:43

## 2019-03-07 RX ADMIN — LIDOCAINE HYDROCHLORIDE 30 MG: 20 INJECTION, SOLUTION INFILTRATION; PERINEURAL at 09:37

## 2019-03-07 RX ADMIN — PROPOFOL 20 MG: 10 INJECTION, EMULSION INTRAVENOUS at 09:50

## 2019-03-07 ASSESSMENT — MIFFLIN-ST. JEOR: SCORE: 1597.71

## 2019-03-07 NOTE — ANESTHESIA CARE TRANSFER NOTE
Patient: Angelita Hobbs    Procedure(s):  Excision of Right Buttocks Mass    Diagnosis: Soft Tissue Mass  Diagnosis Additional Information: No value filed.    Anesthesia Type:   No value filed.     Note:  Airway :Room Air  Patient transferred to:Phase II  Comments: VSS/WNL. Responds well.Handoff Report: Identifed the Patient, Identified the Reponsible Provider, Reviewed the pertinent medical history, Discussed the surgical course, Reviewed Intra-OP anesthesia mangement and issues during anesthesia, Set expectations for post-procedure period and Allowed opportunity for questions and acknowledgement of understanding      Vitals: (Last set prior to Anesthesia Care Transfer)    CRNA VITALS  3/7/2019 0949 - 3/7/2019 1024      3/7/2019             Pulse:  122    SpO2:  99 %    Resp Rate (set):  10                Electronically Signed By: NAIDA Obrien CRNA  March 7, 2019  10:24 AM

## 2019-03-07 NOTE — ANESTHESIA PREPROCEDURE EVALUATION
"Anesthesia Pre-Procedure Evaluation    Patient: Angelita Hobbs   MRN:     [id number] Gender:   female   Age:    28 year old :      1990        Preoperative Diagnosis: Soft Tissue Mass   Procedure(s):  Excision of Right Buttocks Mass     Past Medical History:   Diagnosis Date     Irregular menses      Menarche     age 15      Past Surgical History:   Procedure Laterality Date     HERNIA REPAIR, INGUINAL RT/LT      Right     MOUTH SURGERY  2009    [place] teeth          Anesthesia Evaluation     . Pt has had prior anesthetic.            ROS/MED HX    ENT/Pulmonary:  - neg pulmonary ROS     Neurologic:  - neg neurologic ROS     Cardiovascular:  - neg cardiovascular ROS       METS/Exercise Tolerance:     Hematologic:  - neg hematologic  ROS       Musculoskeletal:         GI/Hepatic:  - neg GI/hepatic ROS       Renal/Genitourinary:  - ROS Renal section negative       Endo:         Psychiatric:         Infectious Disease:         Malignancy:         Other:                         PHYSICAL EXAM:   Mental Status/Neuro: A/A/O   Airway:   Mallampati: II  Mouth/Opening: Full   Respiratory: Auscultation: CTAB      CV: Rhythm: Regular   Comments:                    Lab Results   Component Value Date    GLC 83 2012    HCG Negative 2019       Preop Vitals  BP Readings from Last 3 Encounters:   19 136/75   19 132/82   19 130/62    Pulse Readings from Last 3 Encounters:   19 91   19 78   19 64      Resp Readings from Last 3 Encounters:   19 15   18 16    SpO2 Readings from Last 3 Encounters:   19 100%   19 100%      Temp Readings from Last 1 Encounters:   19 36.9  C (98.5  F) (Oral)    Ht Readings from Last 1 Encounters:   19 1.753 m (5' 9\")      Wt Readings from Last 1 Encounters:   19 79.8 kg (176 lb)    Estimated body mass index is 25.99 kg/m  as calculated from the following:    Height as of this encounter: 1.753 m (5' 9\").    " Weight as of this encounter: 79.8 kg (176 lb).     LDA:  Peripheral IV 03/07/19 Right Hand (Active)   Site Assessment WDL 3/7/2019  8:05 AM   Line Status Infusing 3/7/2019  8:05 AM   Phlebitis Scale 0-->no symptoms 3/7/2019  8:05 AM   Number of days: 0            Assessment:   ASA SCORE: 1    NPO Status: > 6 hours since completed Solid Foods   Documentation: H&P complete; Preop Testing complete; Consents complete   Proceeding: Proceed without further delay  Tobacco Use:  NO Active use of Tobacco/UNKNOWN Tobacco use status     Plan:   Anes. Type:  MAC   Pre-Induction: Midazolam IV   Induction:  IV (Standard)   Airway: Native Airway   Access/Monitoring: PIV   Maintenance: Propofol; IV   Emergence: Procedure Site   Logistics: Same Day Surgery     Postop Pain/Sedation Strategy:  Standard-Options: Opioids PRN     PONV Management:  Adult Risk Factors: Female, Non-Smoker, Postop Opioids  Prevention: Propofol Infusion; Ondansetron     CONSENT: Direct conversation   Plan and risks discussed with: Patient   Blood Products: Consent Deferred (Minimal Blood Loss)                         Rayshawn Mark MD

## 2019-03-07 NOTE — DISCHARGE INSTRUCTIONS
[facility]  Home Care Following Anesthesia  For 24 hours after surgery:  1. Get plenty of rest.  A responsible adult must stay with you for at least 24 hours after you leave the surgery center.  2. Do not drive or use heavy equipment.  If you have weakness or tingling, don't drive or use heavy equipment until this feeling goes away.   3. Do not drink alcohol.   4. Avoid strenuous or risky activities.  Ask for help when climbing stairs.  5. You may feel lightheaded.  IF so, sit for a few minutes before standing.  Have someone help you get up.   6. If you have nausea (feel sick to your stomach): Drink only clear liquids such as apple juice, ginger ale, broth or 7-Up.  Rest may also help.  Be sure to drink enough fluids.  Move to a regular diet as you feel able.   7. You may have a slight fever.  Call the doctor if your fever is over 100 F (37.7 C) (taken under the tongue) or lasts longer than 24 hours.  8. You may have a dry mouth, a sore throat, muscle aches or trouble sleeping. These should go away after 24 hours.  9. Do not make important or legal decisions.               Tips for taking pain medications  To get the best pain relief possible, remember these points:    Take pain medications as directed, before pain becomes severe.    Pain medication can upset your stomach: taking it with food may help.    Constipation is a common side effect of pain medication. Drink plenty of  fluids.    Eat foods high in fiber. Take a stool softener if recommended by your doctor or pharmacist.    Do not drink alcohol, drive or operate machinery while taking pain medications.    Ask about other ways to control pain, such as with heat, ice or relaxation.    Tylenol/Acetaminophen Consumption  To help encourage the safe use of acetaminophen, the makers of TYLENOL  have lowered the maximum daily dose for single-ingredient Extra Strength TYLENOL  (acetaminophen) products sold in the [place] from 8  pills per day (4,000 mg) to 6 pills per day (3,000 mg). The dosing interval has also changed from 2 pills every 4-6 hours to 2 pills every 6 hours.    If you feel your pain relief is insufficient, you may take Tylenol/Acetaminophen in addition to your narcotic pain medication.     Be careful not to exceed 3,000 mg of Tylenol/Acetaminophen in a 24 hour period from all sources.    If you are taking extra strength Tylenol/acetaminophen (500 mg), the maximum dose is 6 tablets in 24 hours.    If you are taking regular strength acetaminophen (325 mg), the maximum dose is 9 tablets in 24 hours.    Call a doctor for any of the followin. Signs of infection (fever, growing tenderness at the surgery site, a large amount of drainage or bleeding, severe pain, foul-smelling drainage, redness, swelling).  2. It has been over 8 to 10 hours since surgery and you are still not able to urinate (pass water).  3. Headache for over 24 hours.  Your doctor is:  Dr. Carmelina Barr, General Surgery: [phone]                    Or dial [phone] and ask for the resident on call for:  General Surgery  For emergency care, call the:  [facility] [facility]:  [phone] (TTY for hearing impaired: [phone])

## 2019-03-07 NOTE — ANESTHESIA POSTPROCEDURE EVALUATION
Anesthesia POST Procedure Evaluation    Patient: Angelita Hobbs   MRN:     [id number] Gender:   female   Age:    28 year old :      1990        Preoperative Diagnosis: Soft Tissue Mass   Procedure(s):  Excision of Right Buttocks Mass   Postop Comments: No value filed.       Anesthesia Type:  MAC    Reportable Event: NO     PAIN: Uncomplicated   Sign Out status: Comfortable, Well controlled pain     PONV: No PONV   Sign Out status:  No Nausea or Vomiting     Neuro/Psych: Uneventful perioperative course   Sign Out Status: Preoperative baseline; Age appropriate mentation     Airway/Resp.: Uneventful perioperative course   Sign Out Status: Non labored breathing, age appropriate RR; Resp. Status within EXPECTED Parameters     CV: Uneventful perioperative course   Sign Out status: Appropriate BP and perfusion indices; Appropriate HR/Rhythm     Disposition:   Sign Out in:  Phase II  Disposition:  Home  Recovery Course: Uneventful  Follow-Up: Not required           Last Anesthesia Record Vitals:  CRNA VITALS  3/7/2019 0949 - 3/7/2019 1049      3/7/2019             Pulse:  122    SpO2:  99 %    Resp Rate (set):  10          Last PACU/Preop Vitals:  Vitals:    19 1030 19 1045 19 1100   BP: 120/70 128/72 125/70   Pulse: 100 98 95   Resp: 12 12 12   Temp:   36.8  C (98.2  F)   SpO2: 98% 98% 98%         Electronically Signed By: Rayshawn Mark MD, 2019, 1:35 PM

## 2019-03-07 NOTE — OP NOTE
Operative Note  PreOp Dx: Infected soft tissue mass  PostOp Dx: Same  Procedure:   1.Excision of soft tissue mass  2. Rectal exam under anesthesia  Surgeon: Carmelina Barr MD   Anesethesia: MAC and Local   EBL: 2ml  Comorbidities:   Patient Active Problem List   Diagnosis     Secondary amenorrhea     Scanty or infrequent menstruation     Screening examination for venereal disease     Nexplanon insertion      Indications: Angelita Hobbs is a 28 year old female  who presented with a 2cm mass on her right buttock. It had been infected and was drained by the PCP.  She has been on antibiotics and the wound is not closed, but significantly better than when I saw her in the clinic. Excision is recommended. Risks, benefits and alternatives are discussed and the patient and mom agree to proceed as planned.   Procedure: The patient was brought to the operating room and placed in a comfortable prone position. IV sedation was administered by anesthesia. All pressure points were padded. The region was prepped and draped in a sterile fashion.  The mass was readily identified. Local anesthesia was established with Marcaine and Lidocaine. A 1.5 cm incision was made with a scalpel over the area. Dissection was carried down to the mass with a combination of blunt and sharp dissection. The mass was sent to Pathology.  The base of the wound was carefully inspected to look for granulation tracts that might extend to the rectum.  No further abnormal tissue was seen.  A rectal exam was performed.  I felt no induration, bands or abnormal woody tissue suggestive of prior healed infections.  Hemostasis was assured. The wound was closed in layers. Surgical glue was applied. The patient tolerated the procedure well and was discharged from the recovery area in good condition.

## 2019-03-11 ENCOUNTER — PATIENT OUTREACH (OUTPATIENT)
Dept: SURGERY | Facility: CLINIC | Age: 29
End: 2019-03-11

## 2019-03-11 LAB — COPATH REPORT: NORMAL

## 2019-03-11 NOTE — PROGRESS NOTES
Angelita Hobbs is a patient of Dr. Carmelina Barr that underwent excision of buttock ST mass approximately 4 days ago.  Attempted to contact patient via telephone for a status update and review post op teaching.  LM on VM to call office.  Await return call.      Of note:  Pathology:  Pending  Wound:  Dermabond  Follow-up:  Routine  Restrictions:  - No activity restrictions  New medications:  None.  May use OTC analgesics.

## 2019-03-12 NOTE — PROGRESS NOTES
RN Post-Op/Post-Discharge Care Coordination Note    Spoke with Patient.    Support  Patient able to care for self independently     Health Status  Fevers/chills: Patient denies any fever or chills.  Drains (JANAK): N/A  Incisions: Patient denies any signs and symptoms of infection..  Wound closure:  Dermabond.  Reports small amount serous drainage that is resolving.    Pain: Minimal- taking Ibuprofen with good pain control  New Medications:  None    Activity/Restrictions  No restrictions    Equipment  None    Pathology reviewed with patient:  Yes    Forms/Letters  No    All of her questions were answered.  She will call this office if she has any further questions and/or concerns.  No post op appointment needed.    Pathology:  Copath Report 03/07/2019  9:59 AM 88   Patient Name: MAE GARCIA   MR#: [id number]   Specimen #: [id number]   Collected: 3/7/2019   Received: 3/7/2019   Reported: 3/11/2019 17:06   Ordering Phy(s): ANITA RIVERA     For improved result formatting, select 'View Enhanced Report Format' under    Linked Documents section.     SPECIMEN(S):   Right buttock mass     FINAL DIAGNOSIS:   RIGHT BUTTOCK MASS, EXCISION:    - Ruptured epidermal inclusion cyst      A copy of this note was routed to the primary surgeon.     Whom and When to Call  Patient acknowledges understanding of how to manage any medication changes and   when to seek medical care.     Patient advised that if after hour medical concerns arise to please call [phone] and choose option 4 to speak to the physician on call.

## 2019-03-28 ENCOUNTER — OFFICE VISIT (OUTPATIENT)
Dept: FAMILY MEDICINE | Facility: CLINIC | Age: 29
End: 2019-03-28
Payer: COMMERCIAL

## 2019-03-28 VITALS
OXYGEN SATURATION: 97 % | TEMPERATURE: 98.1 F | RESPIRATION RATE: 16 BRPM | DIASTOLIC BLOOD PRESSURE: 78 MMHG | HEART RATE: 69 BPM | BODY MASS INDEX: 26.48 KG/M2 | SYSTOLIC BLOOD PRESSURE: 136 MMHG | WEIGHT: 178.8 LBS | HEIGHT: 69 IN

## 2019-03-28 DIAGNOSIS — F43.22 ADJUSTMENT DISORDER WITH ANXIOUS MOOD: ICD-10-CM

## 2019-03-28 PROCEDURE — 99213 OFFICE O/P EST LOW 20 MIN: CPT | Performed by: FAMILY MEDICINE

## 2019-03-28 RX ORDER — CLONAZEPAM 0.5 MG/1
0.25 TABLET ORAL 2 TIMES DAILY PRN
Qty: 20 TABLET | Refills: 1 | Status: SHIPPED | OUTPATIENT
Start: 2019-03-28 | End: 2020-01-30

## 2019-03-28 ASSESSMENT — ANXIETY QUESTIONNAIRES
GAD7 TOTAL SCORE: 0
6. BECOMING EASILY ANNOYED OR IRRITABLE: NOT AT ALL
3. WORRYING TOO MUCH ABOUT DIFFERENT THINGS: NOT AT ALL
2. NOT BEING ABLE TO STOP OR CONTROL WORRYING: NOT AT ALL
5. BEING SO RESTLESS THAT IT IS HARD TO SIT STILL: NOT AT ALL
7. FEELING AFRAID AS IF SOMETHING AWFUL MIGHT HAPPEN: NOT AT ALL
IF YOU CHECKED OFF ANY PROBLEMS ON THIS QUESTIONNAIRE, HOW DIFFICULT HAVE THESE PROBLEMS MADE IT FOR YOU TO DO YOUR WORK, TAKE CARE OF THINGS AT HOME, OR GET ALONG WITH OTHER PEOPLE: NOT DIFFICULT AT ALL
1. FEELING NERVOUS, ANXIOUS, OR ON EDGE: NOT AT ALL

## 2019-03-28 ASSESSMENT — PAIN SCALES - GENERAL: PAINLEVEL: NO PAIN (0)

## 2019-03-28 ASSESSMENT — PATIENT HEALTH QUESTIONNAIRE - PHQ9
SUM OF ALL RESPONSES TO PHQ QUESTIONS 1-9: 0
5. POOR APPETITE OR OVEREATING: NOT AT ALL

## 2019-03-28 ASSESSMENT — MIFFLIN-ST. JEOR: SCORE: 1610.41

## 2019-03-28 NOTE — PATIENT INSTRUCTIONS
Our Clinic hours are:  Mondays    7:20 am - 7 pm  Tues -  Fri  7:20 am - 5 pm    Clinic Phone: [phone]    The clinic lab opens at 7:30 am Mon - Fri and appointments are required.    [facility]. [phone]  Monday  8 am - 7pm  Tues - Fri 8 am - 5:30 pm

## 2019-03-28 NOTE — PROGRESS NOTES
SUBJECTIVE:   Angelita Hobbs is a 28 year old female who presents to clinic today for the following health issues:      Anxiety Follow-Up    Status since last visit: Improved     Other associated symptoms:None    Complicating factors:   Significant life event: No   Current substance abuse: None  Depression symptoms: No  NEGRITO-7 SCORE 10/6/2016   Total Score 5       NEGRITO-7    Amount of exercise or physical activity: 4-5 days/week for an average of 45-60 minutes    Problems taking medications regularly: No    Medication side effects: none    Diet: regular (no restrictions)    ADDITIONAL HPI: 28 year old female here for above issue.     ROS: 10 point review of systems negative except as per HPI.    PAST MEDICAL HISTORY:  Past Medical History:   Diagnosis Date     Irregular menses      Menarche     age 15        ACTIVE MEDICAL PROBLEMS:  Patient Active Problem List   Diagnosis     Secondary amenorrhea     Scanty or infrequent menstruation     Screening examination for venereal disease     Nexplanon insertion        FAMILY HISTORY:  Family History   Problem Relation Age of Onset     Cancer Maternal Grandfather         lung     Heart Disease Maternal Grandfather         valve replaced     Arthritis Paternal Grandfather      Cancer Paternal Grandfather         skin     Diabetes Paternal Grandmother      Anxiety Disorder Maternal Grandmother        SOCIAL HISTORY:  Social History     Socioeconomic History     Marital status: Single     Spouse name: Not on file     Number of children: 0     Years of education: Not on file     Highest education level: Not on file   Occupational History     Employer: STUDENT     Comment: [employer]   Social Needs     Financial resource strain: Not on file     Food insecurity:     Worry: Not on file     Inability: Not on file     Transportation needs:     Medical: Not on file     Non-medical: Not on file   Tobacco Use     Smoking status: Never Smoker     Smokeless  "tobacco: Never Used   Substance and Sexual Activity     Alcohol use: Yes     Comment: socially     Drug use: No     Sexual activity: Yes     Partners: Male     Birth control/protection: Implant   Lifestyle     Physical activity:     Days per week: Not on file     Minutes per session: Not on file     Stress: Not on file   Relationships     Social connections:     Talks on phone: Not on file     Gets together: Not on file     Attends Anabaptism service: Not on file     Active member of club or organization: Not on file     Attends meetings of clubs or organizations: Not on file     Relationship status: Not on file     Intimate partner violence:     Fear of current or ex partner: Not on file     Emotionally abused: Not on file     Physically abused: Not on file     Forced sexual activity: Not on file   Other Topics Concern     Parent/sibling w/ CABG, MI or angioplasty before 65F 55M? No   Social History Narrative     Not on file       MEDICATIONS:  Current Outpatient Medications   Medication Sig Dispense Refill     clonazePAM (KLONOPIN) 0.5 MG tablet Take 0.5 tablets (0.25 mg) by mouth 2 times daily as needed for anxiety 20 tablet 1     etonogestrel (IMPLANON/NEXPLANON) 68 MG IMPL 1 each by Subdermal route once       loratadine (CLARITIN) 10 MG tablet Take 10 mg by mouth as needed          ALLERGIES:   No Known Allergies    Problem list, Medication list, Allergies, and Medical/Social/Surgical histories reviewed in [facility] and updated as appropriate.    OBJECTIVE:                                                    VITALS: /78 (BP Location: Right arm, Cuff Size: Adult Regular)   Pulse 69   Temp 98.1  F (36.7  C) (Oral)   Resp 16   Ht 1.753 m (5' 9\")   Wt 81.1 kg (178 lb 12.8 oz)   SpO2 97%   Breastfeeding? No   BMI 26.40 kg/m   Body mass index is 26.4 kg/m .  GENERAL: Pleasant, well appearing female.  PSYCH: Alert and oriented x3, neatly dressed and well groomed, makes good eye contact, fluid speech - " non-pressured, no psychomotor agitation or slowing, good memory, judgement and insight, no auditory or visual hallucinations, bright affect which is mood congruent.     ASSESSMENT/PLAN:                                                    1. Adjustment disorder with anxious mood  Has used #20 clonazepam in the last 9 months. Is out and would like refills. Uses it very sparingly but very helpful when she does need it. Refilled medications. Follow-up for re-check in 1 year.  - clonazePAM (KLONOPIN) 0.5 MG tablet; Take 0.5 tablets (0.25 mg) by mouth 2 times daily as needed for anxiety  Dispense: 20 tablet; Refill: 1

## 2019-03-29 ASSESSMENT — ANXIETY QUESTIONNAIRES: GAD7 TOTAL SCORE: 0

## 2019-04-25 ENCOUNTER — OFFICE VISIT (OUTPATIENT)
Dept: OBGYN | Facility: CLINIC | Age: 29
End: 2019-04-25
Payer: COMMERCIAL

## 2019-04-25 VITALS
RESPIRATION RATE: 16 BRPM | WEIGHT: 179.3 LBS | SYSTOLIC BLOOD PRESSURE: 134 MMHG | HEIGHT: 69 IN | TEMPERATURE: 96.8 F | DIASTOLIC BLOOD PRESSURE: 76 MMHG | HEART RATE: 91 BPM | BODY MASS INDEX: 26.56 KG/M2

## 2019-04-25 DIAGNOSIS — L68.0 HIRSUTISM: ICD-10-CM

## 2019-04-25 DIAGNOSIS — Z01.419 ENCOUNTER FOR GYNECOLOGICAL EXAMINATION WITHOUT ABNORMAL FINDING: Primary | ICD-10-CM

## 2019-04-25 LAB
FSH SERPL-ACNC: 7.2 IU/L
LH SERPL-ACNC: 14.4 IU/L
TSH SERPL DL<=0.005 MIU/L-ACNC: 1.55 MU/L (ref 0.4–4)

## 2019-04-25 PROCEDURE — 87591 N.GONORRHOEAE DNA AMP PROB: CPT | Performed by: OBSTETRICS & GYNECOLOGY

## 2019-04-25 PROCEDURE — 87491 CHLMYD TRACH DNA AMP PROBE: CPT | Performed by: OBSTETRICS & GYNECOLOGY

## 2019-04-25 PROCEDURE — 87624 HPV HI-RISK TYP POOLED RSLT: CPT | Performed by: OBSTETRICS & GYNECOLOGY

## 2019-04-25 PROCEDURE — 82627 DEHYDROEPIANDROSTERONE: CPT | Performed by: OBSTETRICS & GYNECOLOGY

## 2019-04-25 PROCEDURE — G0145 SCR C/V CYTO,THINLAYER,RESCR: HCPCS | Performed by: OBSTETRICS & GYNECOLOGY

## 2019-04-25 PROCEDURE — 36415 COLL VENOUS BLD VENIPUNCTURE: CPT | Performed by: OBSTETRICS & GYNECOLOGY

## 2019-04-25 PROCEDURE — 84443 ASSAY THYROID STIM HORMONE: CPT | Performed by: OBSTETRICS & GYNECOLOGY

## 2019-04-25 PROCEDURE — 83001 ASSAY OF GONADOTROPIN (FSH): CPT | Performed by: OBSTETRICS & GYNECOLOGY

## 2019-04-25 PROCEDURE — G0124 SCREEN C/V THIN LAYER BY MD: HCPCS | Performed by: OBSTETRICS & GYNECOLOGY

## 2019-04-25 PROCEDURE — 83002 ASSAY OF GONADOTROPIN (LH): CPT | Performed by: OBSTETRICS & GYNECOLOGY

## 2019-04-25 PROCEDURE — 99385 PREV VISIT NEW AGE 18-39: CPT | Performed by: OBSTETRICS & GYNECOLOGY

## 2019-04-25 RX ORDER — CETIRIZINE HYDROCHLORIDE 10 MG/1
10 TABLET ORAL DAILY
COMMUNITY
End: 2020-08-03

## 2019-04-25 ASSESSMENT — MIFFLIN-ST. JEOR: SCORE: 1612.68

## 2019-04-26 LAB
C TRACH DNA SPEC QL NAA+PROBE: NEGATIVE
DHEA-S SERPL-MCNC: 465 UG/DL (ref 35–430)
N GONORRHOEA DNA SPEC QL NAA+PROBE: NEGATIVE
SPECIMEN SOURCE: NORMAL
SPECIMEN SOURCE: NORMAL

## 2019-05-01 LAB
COPATH REPORT: ABNORMAL
PAP: ABNORMAL

## 2019-05-02 DIAGNOSIS — L68.0 HIRSUTISM: Primary | ICD-10-CM

## 2019-05-02 LAB
FINAL DIAGNOSIS: NORMAL
HPV HR 12 DNA CVX QL NAA+PROBE: NEGATIVE
HPV16 DNA SPEC QL NAA+PROBE: NEGATIVE
HPV18 DNA SPEC QL NAA+PROBE: NEGATIVE
SPECIMEN DESCRIPTION: NORMAL
SPECIMEN SOURCE CVX/VAG CYTO: NORMAL

## 2019-05-09 DIAGNOSIS — L68.0 HIRSUTISM: ICD-10-CM

## 2019-05-09 PROCEDURE — 83498 ASY HYDROXYPROGESTERONE 17-D: CPT | Performed by: OBSTETRICS & GYNECOLOGY

## 2019-05-09 PROCEDURE — 84403 ASSAY OF TOTAL TESTOSTERONE: CPT | Performed by: OBSTETRICS & GYNECOLOGY

## 2019-05-09 PROCEDURE — 84270 ASSAY OF SEX HORMONE GLOBUL: CPT | Performed by: OBSTETRICS & GYNECOLOGY

## 2019-05-09 PROCEDURE — 36415 COLL VENOUS BLD VENIPUNCTURE: CPT | Performed by: OBSTETRICS & GYNECOLOGY

## 2019-05-11 LAB
SHBG SERPL-SCNC: 24 NMOL/L (ref 30–135)
TESTOST FREE SERPL-MCNC: 0.87 NG/DL (ref 0.08–0.74)
TESTOST SERPL-MCNC: 41 NG/DL (ref 8–60)

## 2019-05-13 LAB — 17OHP SERPL-MCNC: 71 NG/DL

## 2019-05-15 NOTE — RESULT ENCOUNTER NOTE
Angelita   I am looking into options to deal with the increased androgens in your system.  Dejan Camejo

## 2019-11-01 DIAGNOSIS — L68.0 FEMALE HIRSUTISM: ICD-10-CM

## 2019-11-01 NOTE — TELEPHONE ENCOUNTER
Last office visit 4/25/19 with Dr. Camejo   Hirsutism workup  Spironolactone started 6/15/19  Labs ( potassium, sodium and creatinine ) are not current for RN refill.      Please review and advise on refill.  Thank you.    Naomi Nascimento   [facility]  RN

## 2019-11-02 RX ORDER — SPIRONOLACTONE 100 MG/1
100 TABLET, FILM COATED ORAL DAILY
Qty: 30 TABLET | Refills: 3 | Status: SHIPPED | OUTPATIENT
Start: 2019-11-02 | End: 2020-01-30

## 2020-01-30 ENCOUNTER — OFFICE VISIT (OUTPATIENT)
Dept: FAMILY MEDICINE | Facility: CLINIC | Age: 30
End: 2020-01-30
Payer: COMMERCIAL

## 2020-01-30 VITALS
HEART RATE: 83 BPM | RESPIRATION RATE: 16 BRPM | SYSTOLIC BLOOD PRESSURE: 128 MMHG | OXYGEN SATURATION: 98 % | TEMPERATURE: 97.8 F | WEIGHT: 174 LBS | HEIGHT: 69 IN | DIASTOLIC BLOOD PRESSURE: 78 MMHG | BODY MASS INDEX: 25.77 KG/M2

## 2020-01-30 DIAGNOSIS — L68.0 FEMALE HIRSUTISM: ICD-10-CM

## 2020-01-30 DIAGNOSIS — F43.22 ADJUSTMENT DISORDER WITH ANXIOUS MOOD: Primary | ICD-10-CM

## 2020-01-30 DIAGNOSIS — Z23 NEED FOR VACCINATION: ICD-10-CM

## 2020-01-30 PROCEDURE — 90715 TDAP VACCINE 7 YRS/> IM: CPT | Performed by: FAMILY MEDICINE

## 2020-01-30 PROCEDURE — 99214 OFFICE O/P EST MOD 30 MIN: CPT | Mod: 25 | Performed by: FAMILY MEDICINE

## 2020-01-30 PROCEDURE — 90471 IMMUNIZATION ADMIN: CPT | Performed by: FAMILY MEDICINE

## 2020-01-30 RX ORDER — SPIRONOLACTONE 100 MG/1
100 TABLET, FILM COATED ORAL DAILY
Qty: 90 TABLET | Refills: 4 | Status: SHIPPED | OUTPATIENT
Start: 2020-01-30 | End: 2021-03-09

## 2020-01-30 RX ORDER — CLONAZEPAM 0.5 MG/1
0.25 TABLET ORAL 2 TIMES DAILY PRN
Qty: 20 TABLET | Refills: 1 | Status: SHIPPED | OUTPATIENT
Start: 2020-01-30 | End: 2020-11-17

## 2020-01-30 ASSESSMENT — ANXIETY QUESTIONNAIRES
5. BEING SO RESTLESS THAT IT IS HARD TO SIT STILL: NOT AT ALL
GAD7 TOTAL SCORE: 0
7. FEELING AFRAID AS IF SOMETHING AWFUL MIGHT HAPPEN: NOT AT ALL
6. BECOMING EASILY ANNOYED OR IRRITABLE: NOT AT ALL
3. WORRYING TOO MUCH ABOUT DIFFERENT THINGS: NOT AT ALL
1. FEELING NERVOUS, ANXIOUS, OR ON EDGE: NOT AT ALL
2. NOT BEING ABLE TO STOP OR CONTROL WORRYING: NOT AT ALL

## 2020-01-30 ASSESSMENT — PATIENT HEALTH QUESTIONNAIRE - PHQ9
5. POOR APPETITE OR OVEREATING: NOT AT ALL
SUM OF ALL RESPONSES TO PHQ QUESTIONS 1-9: 0

## 2020-01-30 ASSESSMENT — MIFFLIN-ST. JEOR: SCORE: 1575.7

## 2020-01-30 ASSESSMENT — PAIN SCALES - GENERAL: PAINLEVEL: NO PAIN (0)

## 2020-01-30 NOTE — PATIENT INSTRUCTIONS
Our Clinic hours are:  Mondays    7:20 am - 7 pm  Tues -  Fri  7:20 am - 5 pm    Clinic Phone: [phone]    The clinic lab opens at 7:30 am Mon - Fri and appointments are required.    [facility]. [phone]  Monday  8 am - 7pm  Tues - Fri 8 am - 5:30 pm

## 2020-01-30 NOTE — PROGRESS NOTES
Subjective     Angelita Hobbs is a 29 year old female who presents to clinic today for the following health issues:    HPI   Anxiety Follow-Up    How are you doing with your anxiety since your last visit? Improved states she's been taking her anxiety med less and less    Are you having other symptoms that might be associated with anxiety? No    Have you had a significant life event? No     Are you feeling depressed? No    Do you have any concerns with your use of alcohol or other drugs? Yes:  socially    Social History     Tobacco Use     Smoking status: Never Smoker     Smokeless tobacco: Never Used   Substance Use Topics     Alcohol use: Yes     Comment: socially     Drug use: No     NEGRITO-7 SCORE 10/6/2016 3/28/2019 1/30/2020   Total Score 5 0 0     PHQ 10/6/2016 3/28/2019 1/30/2020   PHQ-9 Total Score 5 0 0   Q9: Thoughts of better off dead/self-harm past 2 weeks Not at all Not at all Not at all           How many servings of fruits and vegetables do you eat daily?  4 or more    On average, how many sweetened beverages do you drink each day (Examples: soda, juice, sweet tea, etc.  Do NOT count diet or artificially sweetened beverages)?   0    How many days per week do you exercise enough to make your heart beat faster? 5    How many minutes a day do you exercise enough to make your heart beat faster? 60 or more    How many days per week do you miss taking your medication? 0    ADDITIONAL HPI: 29 year old female here for above issue.      ROS: 10 point review of systems negative except as per HPI.    PAST MEDICAL HISTORY:  Past Medical History:   Diagnosis Date     Abnormal Pap smear of cervix 04/25/2019    See problem list     Irregular menses      Menarche     age 15        ACTIVE MEDICAL PROBLEMS:  Patient Active Problem List   Diagnosis     Secondary amenorrhea     Scanty or infrequent menstruation     Screening examination for venereal disease     Nexplanon insertion     Adjustment disorder with anxious mood      ASCUS of cervix with negative high risk HPV        FAMILY HISTORY:  Family History   Problem Relation Age of Onset     Cancer Maternal Grandfather         lung     Heart Disease Maternal Grandfather         valve replaced     Arthritis Paternal Grandfather      Cancer Paternal Grandfather         skin     Diabetes Paternal Grandmother      Anxiety Disorder Maternal Grandmother      Breast Cancer Other        SOCIAL HISTORY:  Social History     Socioeconomic History     Marital status: Single     Spouse name: Not on file     Number of children: 0     Years of education: Not on file     Highest education level: Not on file   Occupational History     Employer: STUDENT     Comment: [employer]   Social Needs     Financial resource strain: Not on file     Food insecurity:     Worry: Not on file     Inability: Not on file     Transportation needs:     Medical: Not on file     Non-medical: Not on file   Tobacco Use     Smoking status: Never Smoker     Smokeless tobacco: Never Used   Substance and Sexual Activity     Alcohol use: Yes     Comment: socially     Drug use: No     Sexual activity: Yes     Partners: Male     Birth control/protection: Implant   Lifestyle     Physical activity:     Days per week: Not on file     Minutes per session: Not on file     Stress: Not on file   Relationships     Social connections:     Talks on phone: Not on file     Gets together: Not on file     Attends Confucianism service: Not on file     Active member of club or organization: Not on file     Attends meetings of clubs or organizations: Not on file     Relationship status: Not on file     Intimate partner violence:     Fear of current or ex partner: Not on file     Emotionally abused: Not on file     Physically abused: Not on file     Forced sexual activity: Not on file   Other Topics Concern     Parent/sibling w/ CABG, MI or angioplasty before 65F 55M? No   Social History Narrative     Not on file  "      MEDICATIONS:  Current Outpatient Medications   Medication Sig Dispense Refill     cetirizine (ZYRTEC) 10 MG tablet Take 10 mg by mouth daily       clonazePAM (KLONOPIN) 0.5 MG tablet Take 0.5 tablets (0.25 mg) by mouth 2 times daily as needed for anxiety 20 tablet 1     etonogestrel (IMPLANON/NEXPLANON) 68 MG IMPL 1 each by Subdermal route once       spironolactone (ALDACTONE) 100 MG tablet Take 1 tablet (100 mg) by mouth daily 90 tablet 4       ALLERGIES:   No Known Allergies    Problem list, Medication list, Allergies, and Medical/Social/Surgical histories reviewed in [facility] and updated as appropriate.    OBJECTIVE:                                                    VITALS: /78 (BP Location: Right arm, Cuff Size: Adult Regular)   Pulse 83   Temp 97.8  F (36.6  C) (Tympanic)   Resp 16   Ht 1.74 m (5' 8.5\")   Wt 78.9 kg (174 lb)   LMP 01/01/2020 (Approximate)   SpO2 98%   Breastfeeding No   BMI 26.07 kg/m   Body mass index is 26.07 kg/m .  GENERAL: Pleasant, well appearing female.  PSYCH: Alert and oriented x3, neatly dressed and well groomed, makes good eye contact, fluid speech - non-pressured, no psychomotor agitation or slowing, good memory, judgement and insight, no auditory or visual hallucinations, bright affect which is mood congruent. No suicidal ideation.    ASSESSMENT/PLAN:                                                    1. Adjustment disorder with anxious mood  Stable. Refilled medication.    - clonazePAM (KLONOPIN) 0.5 MG tablet; Take 0.5 tablets (0.25 mg) by mouth 2 times daily as needed for anxiety  Dispense: 20 tablet; Refill: 1    2. Female hirsutism  Well controlled. Refilled medication.     - spironolactone (ALDACTONE) 100 MG tablet; Take 1 tablet (100 mg) by mouth daily  Dispense: 90 tablet; Refill: 4    3. Need for vaccination  - TDAP VACCINE (ADACEL) [57355.002]  - 1st  Administration  [02835]       "

## 2020-01-31 ASSESSMENT — ANXIETY QUESTIONNAIRES: GAD7 TOTAL SCORE: 0

## 2020-03-11 ENCOUNTER — E-VISIT (OUTPATIENT)
Dept: FAMILY MEDICINE | Facility: CLINIC | Age: 30
End: 2020-03-11
Payer: COMMERCIAL

## 2020-03-11 DIAGNOSIS — J02.9 SORE THROAT: Primary | ICD-10-CM

## 2020-03-11 PROCEDURE — 99207 ZZC NON-BILLABLE SERV PER CHARTING: CPT | Performed by: FAMILY MEDICINE

## 2020-03-13 ENCOUNTER — OFFICE VISIT (OUTPATIENT)
Dept: FAMILY MEDICINE | Facility: CLINIC | Age: 30
End: 2020-03-13
Payer: COMMERCIAL

## 2020-03-13 VITALS
TEMPERATURE: 97.4 F | BODY MASS INDEX: 25.77 KG/M2 | DIASTOLIC BLOOD PRESSURE: 75 MMHG | OXYGEN SATURATION: 100 % | SYSTOLIC BLOOD PRESSURE: 130 MMHG | HEART RATE: 74 BPM | WEIGHT: 172 LBS

## 2020-03-13 DIAGNOSIS — J02.8 BACTERIAL PHARYNGITIS: ICD-10-CM

## 2020-03-13 DIAGNOSIS — R07.0 THROAT PAIN: Primary | ICD-10-CM

## 2020-03-13 DIAGNOSIS — B96.89 BACTERIAL PHARYNGITIS: ICD-10-CM

## 2020-03-13 DIAGNOSIS — R53.83 FATIGUE, UNSPECIFIED TYPE: ICD-10-CM

## 2020-03-13 LAB
DEPRECATED S PYO AG THROAT QL EIA: NEGATIVE
SPECIMEN SOURCE: NORMAL
SPECIMEN SOURCE: NORMAL
STREP GROUP A PCR: NOT DETECTED

## 2020-03-13 PROCEDURE — 40001204 ZZHCL STATISTIC STREP A RAPID: Performed by: INTERNAL MEDICINE

## 2020-03-13 PROCEDURE — 99214 OFFICE O/P EST MOD 30 MIN: CPT | Performed by: INTERNAL MEDICINE

## 2020-03-13 PROCEDURE — 87651 STREP A DNA AMP PROBE: CPT | Performed by: INTERNAL MEDICINE

## 2020-03-13 RX ORDER — AMOXICILLIN 500 MG/1
500 CAPSULE ORAL 3 TIMES DAILY
Qty: 30 CAPSULE | Refills: 0 | Status: SHIPPED | OUTPATIENT
Start: 2020-03-13 | End: 2020-03-30

## 2020-03-13 NOTE — PROGRESS NOTES
Jayme Hobbs is a 29 year old female who presents to clinic today for the following health issues:    HPI   ENT Symptoms             Symptoms: cc Present Absent Comment   Fever/Chills   x    Fatigue  x     Muscle Aches   x    Eye Irritation   x    Sneezing   x    Nasal Lalo/Drg  x     Sinus Pressure/Pain  x     Loss of smell   x    Dental pain   x    Sore Throat  x     Swollen Glands  x     Ear Pain/Fullness  x     Cough  x     Wheeze   x    Chest Pain   xx    Shortness of breath   xx    Rash       Other         Symptom duration:  4 days   Symptom severity:  moderate   Treatments tried:  ibuprofen   Contacts:  works at [employer]             Patient Active Problem List   Diagnosis     Secondary amenorrhea     Scanty or infrequent menstruation     Screening examination for venereal disease     Nexplanon insertion     Adjustment disorder with anxious mood     ASCUS of cervix with negative high risk HPV     Past Surgical History:   Procedure Laterality Date     EXCISE MASS BUTTOCK(S) Right 3/7/2019    Procedure: Excision of Right Buttocks Mass;  Surgeon: Carmelina Barr MD;  Location: [facility] OR     HERNIA REPAIR, INGUINAL RT/LT  1996    Right     MOUTH SURGERY  2009    [place] teeth       Social History     Tobacco Use     Smoking status: Never Smoker     Smokeless tobacco: Never Used   Substance Use Topics     Alcohol use: Yes     Comment: socially     Family History   Problem Relation Age of Onset     Cancer Maternal Grandfather         lung     Heart Disease Maternal Grandfather         valve replaced     Arthritis Paternal Grandfather      Cancer Paternal Grandfather         skin     Diabetes Paternal Grandmother      Anxiety Disorder Maternal Grandmother      Breast Cancer Other          Current Outpatient Medications   Medication Sig Dispense Refill     amoxicillin (AMOXIL) 500 MG capsule Take 1 capsule (500 mg) by mouth 3 times daily for 10 days 30 capsule 0     cetirizine (ZYRTEC) 10 MG  tablet Take 10 mg by mouth daily       clonazePAM (KLONOPIN) 0.5 MG tablet Take 0.5 tablets (0.25 mg) by mouth 2 times daily as needed for anxiety 20 tablet 1     etonogestrel (IMPLANON/NEXPLANON) 68 MG IMPL 1 each by Subdermal route once       spironolactone (ALDACTONE) 100 MG tablet Take 1 tablet (100 mg) by mouth daily 90 tablet 4     No Known Allergies  Recent Labs   Lab Test 04/25/19  1158 12/17/12  0952   LDL  --  88   HDL  --  68   TRIG  --  56   TSH 1.55  --       BP Readings from Last 3 Encounters:   03/13/20 130/75   01/30/20 128/78   04/25/19 134/76    Wt Readings from Last 3 Encounters:   03/13/20 78 kg (172 lb)   01/30/20 78.9 kg (174 lb)   04/25/19 81.3 kg (179 lb 4.8 oz)                    Reviewed and updated as needed this visit by Provider         Review of Systems   ROS COMP: Constitutional, HEENT, cardiovascular, pulmonary, gi and gu systems are negative, except as otherwise noted.      Objective    /75 (BP Location: Right arm, Patient Position: Sitting, Cuff Size: Adult Regular)   Pulse 74   Temp 97.4  F (36.3  C) (Oral)   Wt 78 kg (172 lb)   SpO2 100%   BMI 25.77 kg/m    Body mass index is 25.77 kg/m .  Physical Exam   GENERAL: healthy, alert and no distress  EYES: Eyes grossly normal to inspection, PERRL and conjunctivae and sclerae normal  HENT: ear canals and TM's normal, nose and mouth without ulcers or lesions  HENT: large swollen adenoids with purulence.  No stridor, speaks with ease  NECK: no adenopathy, no asymmetry, masses, or scars and thyroid normal to palpation  MS: no gross musculoskeletal defects noted, no edema    Diagnostic Test Results:  Labs reviewed in Epic  Results for orders placed or performed in visit on 03/13/20   Streptococcus A Rapid Scr w Reflx to PCR     Status: None    Specimen: Throat   Result Value Ref Range    Strep Specimen Description Throat     Streptococcus Group A Rapid Screen Negative NEG^Negative           Assessment & Plan       ICD-10-CM    1.  "Throat pain  R07.0 Streptococcus A Rapid Scr w Reflx to PCR     Group A Streptococcus PCR Throat Swab     CANCELED: Mononucleosis screen   2. Fatigue, unspecified type  R53.83 CANCELED: Mononucleosis screen   3. Bacterial pharyngitis  J02.8 amoxicillin (AMOXIL) 500 MG capsule    B96.89         BMI:   Estimated body mass index is 25.77 kg/m  as calculated from the following:    Height as of 1/30/20: 1.74 m (5' 8.5\").    Weight as of this encounter: 78 kg (172 lb).           Strep screen negative.    Will treat with Abx due to physical findings and symptoms   She did decline monospot screening.   Consider monospot in future if symptoms not improve.      Amoxicillin Rx sent.   Side effects discussed and questions answered.     No follow-ups on file.    Erin Tanner MD  [facility]    "

## 2020-03-28 ENCOUNTER — MYC MEDICAL ADVICE (OUTPATIENT)
Dept: FAMILY MEDICINE | Facility: CLINIC | Age: 30
End: 2020-03-28

## 2020-03-28 DIAGNOSIS — B96.89 BACTERIAL PHARYNGITIS: ICD-10-CM

## 2020-03-28 DIAGNOSIS — J02.8 BACTERIAL PHARYNGITIS: ICD-10-CM

## 2020-03-29 ENCOUNTER — E-VISIT (OUTPATIENT)
Dept: FAMILY MEDICINE | Facility: CLINIC | Age: 30
End: 2020-03-29
Payer: COMMERCIAL

## 2020-03-29 DIAGNOSIS — J02.8 BACTERIAL PHARYNGITIS: Primary | ICD-10-CM

## 2020-03-29 DIAGNOSIS — B96.89 BACTERIAL PHARYNGITIS: Primary | ICD-10-CM

## 2020-03-29 PROCEDURE — 99421 OL DIG E/M SVC 5-10 MIN: CPT | Performed by: INTERNAL MEDICINE

## 2020-03-30 RX ORDER — AMOXICILLIN 500 MG/1
500 CAPSULE ORAL 3 TIMES DAILY
Qty: 30 CAPSULE | Refills: 1 | Status: SHIPPED | OUTPATIENT
Start: 2020-03-30 | End: 2020-08-03

## 2020-08-02 ENCOUNTER — E-VISIT (OUTPATIENT)
Dept: FAMILY MEDICINE | Facility: CLINIC | Age: 30
End: 2020-08-02
Payer: COMMERCIAL

## 2020-08-02 DIAGNOSIS — B96.89 BACTERIAL PHARYNGITIS: ICD-10-CM

## 2020-08-02 DIAGNOSIS — J02.8 BACTERIAL PHARYNGITIS: ICD-10-CM

## 2020-08-02 PROCEDURE — 99422 OL DIG E/M SVC 11-20 MIN: CPT | Performed by: INTERNAL MEDICINE

## 2020-08-03 RX ORDER — AMOXICILLIN 500 MG/1
500 CAPSULE ORAL 3 TIMES DAILY
Qty: 30 CAPSULE | Refills: 1 | Status: SHIPPED | OUTPATIENT
Start: 2020-08-03 | End: 2020-10-29

## 2020-08-18 ENCOUNTER — VIRTUAL VISIT (OUTPATIENT)
Dept: FAMILY MEDICINE | Facility: OTHER | Age: 30
End: 2020-08-18
Payer: COMMERCIAL

## 2020-08-18 PROCEDURE — 99421 OL DIG E/M SVC 5-10 MIN: CPT | Performed by: FAMILY MEDICINE

## 2020-08-18 NOTE — PROGRESS NOTES
"Date: 2020 10:52:28  Clinician: Luke Rebolledo  Clinician NPI: [id number]  Patient: Angelita Hobbs  Patient : 1990  Patient Address: [address] [place] [id number]  Patient Phone: [phone]  Visit Protocol: URI  Patient Summary:  Angelita is a 29 year old ( : 1990 ) female who initiated a Visit for COVID-19 (Coronavirus) evaluation and screening. When asked the question \"Please sign me up to receive news, health information and promotions from [employer].\", Angelita responded \"No\".    When asked when her symptoms started, Angelita reported that she does not have any symptoms.   She denies having recent facial or sinus surgery in the past 60 days.    Pertinent COVID-19 (Coronavirus) information  In the past 14 days, Angelita has worked in a congregate living setting.   She either works or volunteers as a healthcare worker or a , or works or volunteers in a healthcare facility. She provides direct patient care. 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 She lives with a healthcare worker.   Angelita has had a close contact with a laboratory-confirmed COVID-19 patient in the last 14 days. She was not exposed at her work. Additional information about contact with COVID-19 (Coronavirus) patient as reported by the patient (free text): Co-worker is symptomatic and awaiting COVID test results. Her boyfriend has tested positive. I am in close contact with her at work daily. I am leaving on a trip with family Thursday morning and trying to decide if this is safe.    Patient reported they are not living in the same household with a COVID-19 positive patient.  Patient was in an enclosed space for greater than 15 minutes with a COVID-19 patient.  Since 2019, Angelita and has not had upper respiratory infection or influenza-like " illness. Has not been diagnosed with lab-confirmed COVID-19 test   Pertinent medical history  Angelita has taken an antibiotic medication in the past month. Antibiotic details as reported by the patient (free text): Amoxicillin   Angelita does not get yeast infections when she takes antibiotics.   Angelita does not need a return to work/school note.   Weight: 175 lbs   Angelita does not smoke or use smokeless tobacco.   She denies pregnancy and denies breastfeeding. She has menstruated in the past month.   Additional information as reported by the patient (free text): Please advise on testing. I am asymptomatic, but in an age group where asymptomatic transmission is high. I am going on a trip with two people &gt;65 years old and want to take steps to protect them.   Weight: 175 lbs    MEDICATIONS: amoxicillin oral, clonazepam oral, spironolactone-hydrochlorothiazide oral, ALLERGIES: NKDA  Clinician Response:  Dear Angelita,   Based on your exposure to COVID-19 (coronavirus), we would like to test you for this virus.  1. Please call [phone] to schedule your visit. Explain that you were referred by [facility] to have a COVID-19 test. Be ready to share your [facility] visit ID number.  The following will serve as your written order for this COVID Test, ordered by me, for the indication of suspected COVID [Z20.828]: The test will be ordered in [employer], our electronic health record, after you are scheduled. It will show as ordered and authorized by Neto Velez MD.  Order: COVID-19 (coronavirus) PCR for ASYMPTOMATIC EXPOSURE testing from [facility].  If you know you have had close contact with someone who tested positive, you should be quarantined for 14 days after this exposure. You should stay in quarantine for the14 days even if the covid test is negative, the optimal time to test after exposure is 5-7 days from the exposure  Quarantine means   What should I do?  For safety, it's very important to follow these rules. Do this for 14 days after  the date you were last exposed to the virus..  Stay home and away from others. Don't go to school or anywhere else. Generally quarantine means staying home from work but there are some exceptions to this. Please contact your workplace.   No hugging, kissing or shaking hands.  Don't let anyone visit.  Cover your mouth and nose with a mask, tissue or washcloth to avoid spreading germs.  Wash your hands and face often. Use soap and water.  What are the symptoms of COVID-19?  The most common symptoms are cough, fever and trouble breathing. Less common symptoms include headache, body aches, fatigue (feeling very tired), chills, sore throat, stuffy or runny nose, diarrhea (loose poop), loss of taste or smell, belly pain, and nausea or vomiting (feeling sick to your stomach or throwing up).  After 14 days, if you have still don't have symptoms, you likely don't have this virus.  If you develop symptoms, follow these guidelines.  If you're normally healthy: Please start another [facility] visit to report your symptoms. Go to [email or web address].  If you have a serious health problem (like cancer, heart failure, an organ transplant or kidney disease): Call your specialty clinic. Let them know that you might have COVID-19.  2. When it's time for your COVID test:  Stay at least 6 feet away from others. (If someone will drive you to your test, stay in the backseat, as far away from the  as you can.)  Cover your mouth and nose with a mask, tissue or washcloth.  Go straight to the testing site. Don't make any stops on the way there or back.  Please note  Caregivers in these groups are at risk for severe illness due to COVID-19:  o People 65 years and older  o People who live in a nursing home or long-term care facility  o People with chronic disease (lung, heart, cancer, diabetes, kidney, liver, immunologic)  o People who have a weakened immune system, including those who:  Are in cancer treatment  Take medicine that weakens the immune  system, such as corticosteroids  Had a bone marrow or organ transplant  Have an immune deficiency  Have poorly controlled HIV or AIDS  Are obese (body mass index of 40 or higher)  Smoke regularly  Where can I get more information?  [facility] -- About COVID-19: [email or web address]  CDC -- What to Do If You're Sick: www.cdc.gov/coronavirus/2019-ncov/about/steps-when-sick.html  CDC -- Ending Home Isolation: www.cdc.gov/coronavirus/2019-ncov/hcp/disposition-in-home-patients.html  [facility] -- Caring for Someone: www.cdc.gov/coronavirus/2019-ncov/if-you-are-sick/care-for-someone.html  [facility] -- Interim Guidance for Hospital Discharge to Home: www.health.[facility].mn./diseases/coronavirus/hcp/hospdischarge.pdf  [facility] clinical trials (COVID-19 research studies): [email or web address].[email or web address]-clinical-trials  Below are the COVID-19 hotlines at the [facility] ([facility]). Interpreters are available.  For health questions: Call 492-392-6111 or 1-129.427.4041 (7 a.m. to 7 p.m.)  For questions about schools and childcare: Call 812-730-9431 or 1-293.154.5524 (7 a.m. to 7 p.m.)    Diagnosis: Contact with and (suspected) exposure to other viral communicable diseases  Diagnosis ICD: Z20.828

## 2020-08-19 ENCOUNTER — APPOINTMENT (OUTPATIENT)
Dept: LAB | Facility: CLINIC | Age: 30
End: 2020-08-19
Payer: COMMERCIAL

## 2020-08-19 ENCOUNTER — RESULTS ONLY (OUTPATIENT)
Dept: LAB | Age: 30
End: 2020-08-19

## 2020-08-19 DIAGNOSIS — Z20.822 SUSPECTED 2019 NOVEL CORONAVIRUS INFECTION: Primary | ICD-10-CM

## 2020-08-20 LAB
SARS-COV-2 RNA SPEC QL NAA+PROBE: NOT DETECTED
SPECIMEN SOURCE: NORMAL

## 2020-10-14 ENCOUNTER — OFFICE VISIT - HEALTHEAST (OUTPATIENT)
Dept: FAMILY MEDICINE | Facility: CLINIC | Age: 30
End: 2020-10-14

## 2020-10-14 DIAGNOSIS — Z20.822 SUSPECTED 2019 NOVEL CORONAVIRUS INFECTION: ICD-10-CM

## 2020-10-15 ENCOUNTER — RECORDS - HEALTHEAST (OUTPATIENT)
Dept: LAB | Facility: CLINIC | Age: 30
End: 2020-10-15

## 2020-10-15 LAB
SARS-COV-2 PCR COMMENT: NORMAL
SARS-COV-2 RNA SPEC QL NAA+PROBE: NEGATIVE
SARS-COV-2 VIRUS SPECIMEN SOURCE: NORMAL

## 2020-10-29 ENCOUNTER — OFFICE VISIT (OUTPATIENT)
Dept: FAMILY MEDICINE | Facility: CLINIC | Age: 30
End: 2020-10-29
Payer: COMMERCIAL

## 2020-10-29 VITALS
BODY MASS INDEX: 25.68 KG/M2 | TEMPERATURE: 97.8 F | HEIGHT: 69 IN | RESPIRATION RATE: 16 BRPM | SYSTOLIC BLOOD PRESSURE: 134 MMHG | HEART RATE: 82 BPM | OXYGEN SATURATION: 100 % | DIASTOLIC BLOOD PRESSURE: 60 MMHG | WEIGHT: 173.4 LBS

## 2020-10-29 DIAGNOSIS — J35.01 CHRONIC TONSILLITIS: Primary | ICD-10-CM

## 2020-10-29 DIAGNOSIS — Z30.46 ENCOUNTER FOR REMOVAL AND REINSERTION OF NEXPLANON: ICD-10-CM

## 2020-10-29 PROCEDURE — 11983 REMOVE/INSERT DRUG IMPLANT: CPT | Performed by: FAMILY MEDICINE

## 2020-10-29 PROCEDURE — 99213 OFFICE O/P EST LOW 20 MIN: CPT | Mod: 25 | Performed by: FAMILY MEDICINE

## 2020-10-29 RX ORDER — ETONOGESTREL 68 MG/1
1 IMPLANT SUBCUTANEOUS ONCE
Start: 2020-10-29 | End: 2020-10-29

## 2020-10-29 RX ORDER — LORATADINE 10 MG/1
10 TABLET ORAL DAILY
COMMUNITY
End: 2022-04-06

## 2020-10-29 ASSESSMENT — PAIN SCALES - GENERAL: PAINLEVEL: NO PAIN (0)

## 2020-10-29 ASSESSMENT — MIFFLIN-ST. JEOR: SCORE: 1567.98

## 2020-10-29 NOTE — PROGRESS NOTES
"Subjective     Angelita Hobbs is a 30 year old female who presents to clinic today for the following health issues:    HPI         Problem:   Chief Complaint   Patient presents with     Contraception     Nexplanon removal and replacement     Patient Request     states she's had crhonic Tonsilitis x 6 months, Wondering if she should get a referral to see ENT.         Review of Systems   Constitutional, neuro, ENT, endocrine, pulmonary, cardiac, gastrointestinal, genitourinary, musculoskeletal, integument and psychiatric systems are negative, except as otherwise noted.       Objective    /60   Pulse 82   Temp 97.8  F (36.6  C) (Tympanic)   Resp 16   Ht 1.74 m (5' 8.5\")   Wt 78.7 kg (173 lb 6.4 oz)   SpO2 100%   Breastfeeding No   BMI 25.98 kg/m    Body mass index is 25.98 kg/m .  Physical Exam   GENERAL: Pleasant, well appearing female.  HEENT: TMs normal, oropharynx clear, 1+ cryptic tonsils. No cervical adenopathy.     PROCEDURE: Nexplanon   removal  Performing Physician: Gris Gomez MD     Anesthesia: 1cc 1% Lidocaine w/ epinephrine    Procedure: Consent obtained. The area surrounding the distal edge of Nexplanon was prepared with Choloraprep and draped in the usual sterile manner. The site was anesthetized with lidocaine. A perpendicular skin incision was made over the distal aspect of the device. There was a significant amount of subcutaneous scar tissue and a thick connective tissue capsul surrounding the implant. Skin incision was extended to 1cm in length.  Distal end of implant was grabbed with a curved richard and held in place with gentle traction while the capsule was lysed sharply and the device removed using a hemostat.  [ ] Right Arm  [x] Left Arm  Lot # [id number]  Sterile Preparation: [_] Betadine [x] Hibiclens   Nexplanon  trocar was then inserted subcutaneously through removal incision site and then Nexplanon  capsule delivered subcutaneously. I did angle the implant slightly so as " "to avoid the scarred capsul and placed it slightly posterior and more proximal to previous location.  Trocar was removed from the insertion site.   Nexplanon  capsule was palpated by provider to assure satisfactory placement.   Given the need to extend the removal incision to 1cm I placed 2 simple interrupted sutures of 4-0 Ethylon to close the incision.  Dressings applied:  Steri-strip and Pressure dressing   Followup: The patient tolerated the procedure well. Standard post-procedure care is explained and return precautions are given. Suture removal in 10 days.        Assessment & Plan     Chronic tonsillitis  Possibly triggered by cryptic tonsil. Advised trial of postprandial antiseptic mouthwash gargles.  If persistent follow-up with ENT.  - OTOLARYNGOLOGY REFERRAL    Encounter for removal and reinsertion of Nexplanon  Removal and re-insertion.  Adjusted placement to facilitate removal the next time.       BMI:   Estimated body mass index is 25.98 kg/m  as calculated from the following:    Height as of this encounter: 1.74 m (5' 8.5\").    Weight as of this encounter: 78.7 kg (173 lb 6.4 oz).                No follow-ups on file.    Gris Gomez MD  [facility]    "

## 2020-10-29 NOTE — PATIENT INSTRUCTIONS
Our Clinic hours are:  Mondays    7:20 am - 7 pm  Tues -  Fri  7:20 am - 5 pm    Clinic Phone: [phone]    The clinic lab opens at 7:30 am Mon - Fri and appointments are required.    [facility]. [phone]  Monday  8 am - 7pm  Tues - Fri 8 am - 5:30 pm

## 2020-11-13 DIAGNOSIS — F43.22 ADJUSTMENT DISORDER WITH ANXIOUS MOOD: ICD-10-CM

## 2020-11-13 NOTE — TELEPHONE ENCOUNTER
Requested Prescriptions   Pending Prescriptions Disp Refills     clonazePAM (KLONOPIN) 0.5 MG tablet [Pharmacy Med Name: CLONAZEPAM 0.5MG TABLETS] 20 tablet      Sig: TAKE 1/2 TABLET(0.25 MG) BY MOUTH TWICE DAILY AS NEEDED FOR ANXIETY       There is no refill protocol information for this order

## 2020-11-17 RX ORDER — CLONAZEPAM 0.5 MG/1
TABLET ORAL
Qty: 20 TABLET | Refills: 0 | Status: SHIPPED | OUTPATIENT
Start: 2020-11-17 | End: 2021-05-27

## 2020-11-19 ENCOUNTER — VIRTUAL VISIT (OUTPATIENT)
Dept: FAMILY MEDICINE | Facility: CLINIC | Age: 30
End: 2020-11-19
Payer: COMMERCIAL

## 2020-11-19 DIAGNOSIS — F43.22 ADJUSTMENT DISORDER WITH ANXIOUS MOOD: Primary | ICD-10-CM

## 2020-11-19 PROCEDURE — 99213 OFFICE O/P EST LOW 20 MIN: CPT | Mod: 95 | Performed by: FAMILY MEDICINE

## 2020-11-19 RX ORDER — CITALOPRAM HYDROBROMIDE 20 MG/1
20 TABLET ORAL DAILY
Qty: 30 TABLET | Refills: 1 | Status: SHIPPED | OUTPATIENT
Start: 2020-11-19 | End: 2020-12-21

## 2020-11-19 ASSESSMENT — ANXIETY QUESTIONNAIRES
6. BECOMING EASILY ANNOYED OR IRRITABLE: MORE THAN HALF THE DAYS
3. WORRYING TOO MUCH ABOUT DIFFERENT THINGS: MORE THAN HALF THE DAYS
7. FEELING AFRAID AS IF SOMETHING AWFUL MIGHT HAPPEN: NOT AT ALL
1. FEELING NERVOUS, ANXIOUS, OR ON EDGE: MORE THAN HALF THE DAYS
2. NOT BEING ABLE TO STOP OR CONTROL WORRYING: MORE THAN HALF THE DAYS
5. BEING SO RESTLESS THAT IT IS HARD TO SIT STILL: NOT AT ALL
GAD7 TOTAL SCORE: 10

## 2020-11-19 ASSESSMENT — PATIENT HEALTH QUESTIONNAIRE - PHQ9
SUM OF ALL RESPONSES TO PHQ QUESTIONS 1-9: 1
5. POOR APPETITE OR OVEREATING: MORE THAN HALF THE DAYS

## 2020-11-19 NOTE — PATIENT INSTRUCTIONS
Our Clinic hours are:  Mondays    7:20 am - 7 pm  Tues -  Fri  7:20 am - 5 pm    Clinic Phone: [phone]    The clinic lab opens at 7:30 am Mon - Fri and appointments are required.    [facility]. [phone]  Monday  8 am - 7pm  Tues - Fri 8 am - 5:30 pm

## 2020-11-19 NOTE — PROGRESS NOTES
"Angelita Hobbs is a 30 year old female who is being evaluated via a billable video visit.      The patient has been notified of following:     \"This video visit will be conducted via a call between you and your physician/provider. We have found that certain health care needs can be provided without the need for an in-person physical exam.  This service lets us provide the care you need with a video conversation.  If a prescription is necessary we can send it directly to your pharmacy.  If lab work is needed we can place an order for that and you can then stop by our lab to have the test done at a later time.    Video visits are billed at different rates depending on your insurance coverage.  Please reach out to your insurance provider with any questions.    If during the course of the call the physician/provider feels a video visit is not appropriate, you will not be charged for this service.\"    Patient has given verbal consent for Video visit? Yes  How would you like to obtain your AVS? MyChart  If you are dropped from the video visit, the video invite should be resent to: Send to e-mail at: [email or web address]  Will anyone else be joining your video visit? No      Subjective     Angelita Hobbs is a 30 year old female who presents today via video visit for the following health issues:    HPI     Anxiety Follow-Up    How are you doing with your anxiety since your last visit? Worse  up and down throughout this past year.    Are you having other symptoms that might be associated with anxiety? Yes:  lack of appetite, feeling anxious    Have you had a significant life event? OTHER: Cousin , aunt  of cancer, sister got a divorce, Covid, Works at a hospital      Are you feeling depressed? No    Do you have any concerns with your use of alcohol or other drugs? No    Social History     Tobacco Use     Smoking status: Never Smoker     Smokeless tobacco: Never Used   Substance Use Topics     Alcohol use: " Yes     Comment: socially     Drug use: No     NEGRITO-7 SCORE 3/28/2019 1/30/2020 11/19/2020   Total Score 0 0 10     PHQ 3/28/2019 1/30/2020 11/19/2020   PHQ-9 Total Score 0 0 1   Q9: Thoughts of better off dead/self-harm past 2 weeks Not at all Not at all Not at all     Last PHQ-9 11/19/2020   1.  Little interest or pleasure in doing things 0   2.  Feeling down, depressed, or hopeless 0   3.  Trouble falling or staying asleep, or sleeping too much 0   4.  Feeling tired or having little energy 0   5.  Poor appetite or overeating 1   6.  Feeling bad about yourself 0   7.  Trouble concentrating 0   8.  Moving slowly or restless 0   Q9: Thoughts of better off dead/self-harm past 2 weeks 0   PHQ-9 Total Score 1   Difficulty at work, home, or with people -     NEGRITO-7  11/19/2020   1. Feeling nervous, anxious, or on edge 2   2. Not being able to stop or control worrying 2   3. Worrying too much about different things 2   4. Trouble relaxing 2   5. Being so restless that it is hard to sit still 0   6. Becoming easily annoyed or irritable 2   7. Feeling afraid, as if something awful might happen 0   NEGRITO-7 Total Score 10   If you checked any problems, how difficult have they made it for you to do your work, take care of things at home, or get along with other people? -         How many servings of fruits and vegetables do you eat daily?  4 or more    On average, how many sweetened beverages do you drink each day (Examples: soda, juice, sweet tea, etc.  Do NOT count diet or artificially sweetened beverages)?   0    How many days per week do you exercise enough to make your heart beat faster? 5    How many minutes a day do you exercise enough to make your heart beat faster? 30 - 60    How many days per week do you miss taking your medication? 0         Video Start Time: 10:09        Review of Systems   Constitutional, neuro, ENT, endocrine, pulmonary, cardiac, gastrointestinal, genitourinary, musculoskeletal, integument and  psychiatric systems are negative, except as otherwise noted.       Objective           Vitals:  No vitals were obtained today due to virtual visit.    Physical Exam     GENERAL: Healthy, alert and no distress  EYES: Eyes grossly normal to inspection.  No discharge or erythema, or obvious scleral/conjunctival abnormalities.  RESP: No audible wheeze, cough, or visible cyanosis.  No visible retractions or increased work of breathing.    SKIN: Visible skin clear. No significant rash, abnormal pigmentation or lesions.  NEURO: Cranial nerves grossly intact.  Mentation and speech appropriate for age.  PSYCH: Mentation appears normal, affect normal/bright, judgement and insight intact, normal speech and appearance well-groomed.              Assessment & Plan     Adjustment disorder with anxious mood  Uncontrolled. Start Celexa.  Discussed use and side effects of medication including, but not limited to, increased risk of suicidal ideation when starting, adjusting dose or stopping medication. Follow-up for re-check in 1 month. Can continue with PRN klonazepam but should hopefully see decreasing need for this as Celexa reaches therapeutic levels. This was just refilled 11/17/20 so no refills needed today.  If doing well on Celexa can just message for refills. If not ideally controlled then office visit or virtual visit for medication re-check.  - citalopram (CELEXA) 20 MG tablet; Take 1 tablet (20 mg) by mouth daily To start 1/2 tab daily x 6 days then full tab daily.            Return in about 1 month (around 12/19/2020) for medication re-check.    Gris Gomez MD  [facility]      Video-Visit Details    Type of service:  Video Visit    Video End Time:10:24    Originating Location (pt. Location): Home    Distant Location (provider location):  [facility]     Platform used for Video Visit: [place]

## 2020-11-20 ASSESSMENT — ANXIETY QUESTIONNAIRES: GAD7 TOTAL SCORE: 10

## 2020-12-14 ENCOUNTER — HEALTH MAINTENANCE LETTER (OUTPATIENT)
Age: 30
End: 2020-12-14

## 2021-01-22 ENCOUNTER — MYC MEDICAL ADVICE (OUTPATIENT)
Dept: FAMILY MEDICINE | Facility: CLINIC | Age: 31
End: 2021-01-22

## 2021-01-22 DIAGNOSIS — F43.22 ADJUSTMENT DISORDER WITH ANXIOUS MOOD: ICD-10-CM

## 2021-01-22 RX ORDER — CITALOPRAM HYDROBROMIDE 20 MG/1
20 TABLET ORAL DAILY
Qty: 90 TABLET | Refills: 4 | Status: SHIPPED | OUTPATIENT
Start: 2021-01-22 | End: 2021-04-23

## 2021-01-22 NOTE — CONFIDENTIAL NOTE
Entered prescription for one tablet daily to fix computer issue at [facility].  Patient has completed the 6 day titration.    Faby Perdomo RN

## 2021-03-08 ENCOUNTER — TELEPHONE (OUTPATIENT)
Dept: FAMILY MEDICINE | Facility: CLINIC | Age: 31
End: 2021-03-08

## 2021-03-08 DIAGNOSIS — L68.0 FEMALE HIRSUTISM: ICD-10-CM

## 2021-03-08 NOTE — TELEPHONE ENCOUNTER
"Requested Prescriptions   Pending Prescriptions Disp Refills     spironolactone (ALDACTONE) 100 MG tablet [Pharmacy Med Name: SPIRONOLACTONE 100MG TABLETS] 90 tablet 4     Sig: TAKE 1 TABLET(100 MG) BY MOUTH DAILY       Diuretics (Including Combos) Protocol Failed - 3/8/2021  8:34 AM        Failed - Normal serum creatinine on file in past 12 months     No lab results found.           Failed - Normal serum potassium on file in past 12 months     No lab results found.                 Failed - Normal serum sodium on file in past 12 months     No lab results found.           Passed - Blood pressure under 140/90 in past 12 months     BP Readings from Last 3 Encounters:   10/29/20 134/60   03/13/20 130/75   01/30/20 128/78                 Passed - Recent (12 mo) or future (30 days) visit within the authorizing provider's specialty     Patient has had an office visit with the authorizing provider or a provider within the authorizing providers department within the previous 12 mos or has a future within next 30 days. See \"Patient Info\" tab in inbasket, or \"Choose Columns\" in Meds & Orders section of the refill encounter.              Passed - Medication is active on med list        Passed - Patient is age 18 or older        Passed - No active pregancy on record        Passed - No positive pregnancy test in past 12 months             "

## 2021-03-09 RX ORDER — SPIRONOLACTONE 100 MG/1
TABLET, FILM COATED ORAL
Qty: 90 TABLET | Refills: 1 | Status: SHIPPED | OUTPATIENT
Start: 2021-03-09 | End: 2021-10-12

## 2021-03-09 NOTE — TELEPHONE ENCOUNTER
Left message on personal answering machine to return call if there are any questions. She was informed to make a lab only appointment. Direct line provided.  Annmarie Martínez RN

## 2021-03-16 ENCOUNTER — MYC MEDICAL ADVICE (OUTPATIENT)
Dept: FAMILY MEDICINE | Facility: CLINIC | Age: 31
End: 2021-03-16

## 2021-03-29 DIAGNOSIS — L68.0 FEMALE HIRSUTISM: ICD-10-CM

## 2021-03-29 PROCEDURE — 80048 BASIC METABOLIC PNL TOTAL CA: CPT | Performed by: FAMILY MEDICINE

## 2021-03-29 PROCEDURE — 36415 COLL VENOUS BLD VENIPUNCTURE: CPT | Performed by: FAMILY MEDICINE

## 2021-03-30 LAB
ANION GAP SERPL CALCULATED.3IONS-SCNC: 11 MMOL/L (ref 3–14)
BUN SERPL-MCNC: 12 MG/DL (ref 7–30)
CALCIUM SERPL-MCNC: 8.9 MG/DL (ref 8.5–10.1)
CHLORIDE SERPL-SCNC: 104 MMOL/L (ref 94–109)
CO2 SERPL-SCNC: 25 MMOL/L (ref 20–32)
CREAT SERPL-MCNC: 0.9 MG/DL (ref 0.52–1.04)
GFR SERPL CREATININE-BSD FRML MDRD: 86 ML/MIN/{1.73_M2}
GLUCOSE SERPL-MCNC: 85 MG/DL (ref 70–99)
POTASSIUM SERPL-SCNC: 3.9 MMOL/L (ref 3.4–5.3)
SODIUM SERPL-SCNC: 140 MMOL/L (ref 133–144)

## 2021-04-22 ENCOUNTER — MYC MEDICAL ADVICE (OUTPATIENT)
Dept: FAMILY MEDICINE | Facility: CLINIC | Age: 31
End: 2021-04-22

## 2021-04-23 NOTE — CONFIDENTIAL NOTE
Please see [employer] message - would like to know if she needs to wean off Cymbalta.    Faby Perdomo RN

## 2021-05-11 ENCOUNTER — MYC MEDICAL ADVICE (OUTPATIENT)
Dept: FAMILY MEDICINE | Facility: CLINIC | Age: 31
End: 2021-05-11

## 2021-08-02 ENCOUNTER — MYC REFILL (OUTPATIENT)
Dept: FAMILY MEDICINE | Facility: CLINIC | Age: 31
End: 2021-08-02

## 2021-08-02 DIAGNOSIS — F43.22 ADJUSTMENT DISORDER WITH ANXIOUS MOOD: ICD-10-CM

## 2021-08-05 NOTE — TELEPHONE ENCOUNTER
Routing refill request to provider for review/approval because:  Last written 5/27/21 #20 + 1 refill.  Last visit was a Virtual Appt on 11/19/20.  On going med?  Advise.  Lisa

## 2021-08-06 RX ORDER — CLONAZEPAM 0.5 MG/1
0.25 TABLET ORAL 2 TIMES DAILY PRN
Qty: 20 TABLET | Refills: 0 | Status: SHIPPED | OUTPATIENT
Start: 2021-08-06 | End: 2022-04-06

## 2021-08-06 NOTE — TELEPHONE ENCOUNTER
Appears to be using about #20/month. I wonder if she would benefit from better baseline control. It's been a while since last office visit. I would recommend re-check to review treatment options. Refilled once now. Will address additional refills at visit.

## 2021-08-06 NOTE — TELEPHONE ENCOUNTER
Left message on personal answering machine to return call. Direct line provided.  Annmarie Martínez RN

## 2021-08-09 ENCOUNTER — MYC MEDICAL ADVICE (OUTPATIENT)
Dept: FAMILY MEDICINE | Facility: CLINIC | Age: 31
End: 2021-08-09

## 2021-08-09 NOTE — TELEPHONE ENCOUNTER
LM to call clinic/RN concerning Klonopin refill.  Sent Mychart msg concerning plan below.  KPavelRN    MyC Refill    EA    7:40 AM  Bridget Turner routed this conversation to  Provider Gris Zarate MD  to  Provider Careteam Pool    SP    7:01 AM  Note     Appears to be using about #20/month. I wonder if she would benefit from better baseline control. It's been a while since last office visit. I would recommend re-check to review treatment options. Refilled once now. Will address additional refills at visit.      August 5, 2021

## 2021-08-10 ENCOUNTER — MYC MEDICAL ADVICE (OUTPATIENT)
Dept: FAMILY MEDICINE | Facility: CLINIC | Age: 31
End: 2021-08-10

## 2021-08-10 NOTE — TELEPHONE ENCOUNTER
Left detailed message on pt identified voicemail - Appt needed for further refills   Also sent message via My Chart

## 2021-08-19 ENCOUNTER — VIRTUAL VISIT (OUTPATIENT)
Dept: FAMILY MEDICINE | Facility: CLINIC | Age: 31
End: 2021-08-19
Payer: COMMERCIAL

## 2021-08-19 DIAGNOSIS — F41.9 ANXIETY: Primary | ICD-10-CM

## 2021-08-19 PROCEDURE — 99213 OFFICE O/P EST LOW 20 MIN: CPT | Mod: GT | Performed by: FAMILY MEDICINE

## 2021-08-19 RX ORDER — CITALOPRAM HYDROBROMIDE 20 MG/1
20 TABLET ORAL DAILY
Qty: 90 TABLET | Refills: 4 | Status: SHIPPED | OUTPATIENT
Start: 2021-08-19 | End: 2021-12-17

## 2021-08-19 ASSESSMENT — ANXIETY QUESTIONNAIRES
GAD7 TOTAL SCORE: 5
8. IF YOU CHECKED OFF ANY PROBLEMS, HOW DIFFICULT HAVE THESE MADE IT FOR YOU TO DO YOUR WORK, TAKE CARE OF THINGS AT HOME, OR GET ALONG WITH OTHER PEOPLE?: SOMEWHAT DIFFICULT
3. WORRYING TOO MUCH ABOUT DIFFERENT THINGS: SEVERAL DAYS
4. TROUBLE RELAXING: SEVERAL DAYS
GAD7 TOTAL SCORE: 5
6. BECOMING EASILY ANNOYED OR IRRITABLE: SEVERAL DAYS
GAD7 TOTAL SCORE: 5
2. NOT BEING ABLE TO STOP OR CONTROL WORRYING: SEVERAL DAYS
7. FEELING AFRAID AS IF SOMETHING AWFUL MIGHT HAPPEN: NOT AT ALL
1. FEELING NERVOUS, ANXIOUS, OR ON EDGE: SEVERAL DAYS
5. BEING SO RESTLESS THAT IT IS HARD TO SIT STILL: NOT AT ALL
7. FEELING AFRAID AS IF SOMETHING AWFUL MIGHT HAPPEN: NOT AT ALL

## 2021-08-19 ASSESSMENT — PATIENT HEALTH QUESTIONNAIRE - PHQ9
10. IF YOU CHECKED OFF ANY PROBLEMS, HOW DIFFICULT HAVE THESE PROBLEMS MADE IT FOR YOU TO DO YOUR WORK, TAKE CARE OF THINGS AT HOME, OR GET ALONG WITH OTHER PEOPLE: SOMEWHAT DIFFICULT
SUM OF ALL RESPONSES TO PHQ QUESTIONS 1-9: 2
SUM OF ALL RESPONSES TO PHQ QUESTIONS 1-9: 2

## 2021-08-19 NOTE — PROGRESS NOTES
"Angelita is a 30 year old who is being evaluated via a billable video visit.      How would you like to obtain your AVS? MyChart  If the video visit is dropped, the invitation should be resent by: Text to cell phone: [phone]  Will anyone else be joining your video visit? No    Video Start Time: 2:34    Assessment & Plan     Anxiety  Well controlled. Refilled medication.     - citalopram (CELEXA) 20 MG tablet; Take 1 tablet (20 mg) by mouth daily             BMI:   Estimated body mass index is 25.98 kg/m  as calculated from the following:    Height as of 10/29/20: 1.74 m (5' 8.5\").    Weight as of 10/29/20: 78.7 kg (173 lb 6.4 oz).           No follow-ups on file.    Gris Gomez MD  [facility]    Jayme Goldstein is a 30 year old who presents for the following health issues     HPI     Anxiety Follow-Up    How are you doing with your anxiety since your last visit? Increased frequency in taking medication    Are you having other symptoms that might be associated with anxiety? Yes:  nausea, insomnia, adrenaline rush    Have you had a significant life event? No     Are you feeling depressed? No    Do you have any concerns with your use of alcohol or other drugs? No    Social History     Tobacco Use     Smoking status: Never Smoker     Smokeless tobacco: Never Used   Substance Use Topics     Alcohol use: Yes     Comment: socially     Drug use: No     NEGRITO-7 SCORE 3/28/2019 1/30/2020 11/19/2020   Total Score 0 0 10     PHQ 3/28/2019 1/30/2020 11/19/2020   PHQ-9 Total Score 0 0 1   Q9: Thoughts of better off dead/self-harm past 2 weeks Not at all Not at all Not at all     Last PHQ-9 11/19/2020   1.  Little interest or pleasure in doing things 0   2.  Feeling down, depressed, or hopeless 0   3.  Trouble falling or staying asleep, or sleeping too much 0   4.  Feeling tired or having little energy 0   5.  Poor appetite or overeating 1   6.  Feeling bad about yourself 0   7.  Trouble " concentrating 0   8.  Moving slowly or restless 0   Q9: Thoughts of better off dead/self-harm past 2 weeks 0   PHQ-9 Total Score 1   Difficulty at work, home, or with people -     NEGRITO-7  11/19/2020   1. Feeling nervous, anxious, or on edge 2   2. Not being able to stop or control worrying 2   3. Worrying too much about different things 2   4. Trouble relaxing 2   5. Being so restless that it is hard to sit still 0   6. Becoming easily annoyed or irritable 2   7. Feeling afraid, as if something awful might happen 0   NEGRITO-7 Total Score 10   If you checked any problems, how difficult have they made it for you to do your work, take care of things at home, or get along with other people? -         How many servings of fruits and vegetables do you eat daily?  4 or more    On average, how many sweetened beverages do you drink each day (Examples: soda, juice, sweet tea, etc.  Do NOT count diet or artificially sweetened beverages)?   0    How many days per week do you exercise enough to make your heart beat faster? 6    How many minutes a day do you exercise enough to make your heart beat faster? 30 - 60    How many days per week do you miss taking your medication? 0        Review of Systems   Constitutional, neuro, ENT, endocrine, pulmonary, cardiac, gastrointestinal, genitourinary, musculoskeletal, integument and psychiatric systems are negative, except as otherwise noted.       Objective           Vitals:  No vitals were obtained today due to virtual visit.    Physical Exam   PSYCH: Alert and oriented x3, fluid speech - non-pressured, good memory, judgement and insight, no auditory or visual hallucinations, bright affect which is mood congruent. No suicidal ideation.             Video-Visit Details    Type of service:  Video Visit - telephone 8 min    Video End Time:2:34    Originating Location (pt. Location): Home    Distant Location (provider location):  [facility]     Platform used for Video  Visit: Sindi  Answers for HPI/ROS submitted by the patient on 8/19/2021  If you checked off any problems, how difficult have these problems made it for you to do your work, take care of things at home, or get along with other people?: Somewhat difficult  PHQ9 TOTAL SCORE: 2  NEGRITO 7 TOTAL SCORE: 5

## 2021-08-20 ASSESSMENT — ANXIETY QUESTIONNAIRES: GAD7 TOTAL SCORE: 5

## 2021-08-20 ASSESSMENT — PATIENT HEALTH QUESTIONNAIRE - PHQ9: SUM OF ALL RESPONSES TO PHQ QUESTIONS 1-9: 2

## 2021-09-02 PROBLEM — F41.9 ANXIETY: Status: ACTIVE | Noted: 2021-09-02

## 2021-09-02 PROBLEM — F43.22 ADJUSTMENT DISORDER WITH ANXIOUS MOOD: Status: RESOLVED | Noted: 2019-03-28 | Resolved: 2021-09-02

## 2021-09-02 NOTE — PATIENT INSTRUCTIONS
Your BMI is Body mass index is There is no height or weight on file to calculate BMI.     A BMI of 18.5 to 24.9 is in the healthy range. A person with a BMI of 25 to 29.9 is considered overweight, and someone with a BMI of 30 or greater is considered obese. More than two-thirds of American adults are considered overweight or obese.  Weight management is a personal decision.  If you are interested in exploring weight loss strategies, the following discussion covers the approaches that may be successful. Body mass index (BMI) is one way to tell whether you are at a healthy weight, overweight, or obese. It measures your weight in relation to your height.  Being overweight or obese increases the risk for further weight gain. Excess weight may lead to heart disease and diabetes.  Creating and following plans for healthy eating and physical activity may help you improve your health.  Weight control is part of healthy lifestyle and includes exercise, emotional health, and healthy eating habits. Careful eating habits lifelong are the mainstay of weight control. Though there are significant health benefits from weight loss, long-term weight loss with diet alone may be very difficult to achieve- studies show long-term success with dietary management alone in less than 10% of people. Attaining a healthy weight may be especially difficult to achieve in those with severe obesity. In some cases, medications, devices and surgical management might be considered.  What can you do?    Keep a food journal to help with mindful eating and finding ways to modify your diet.      Reduce the amount of processed food in your diet. Focus on adding vegetables, and lean proteins.    Reduce dietary carbohydrates. Limiting to  gm of carbohydrates per day has been shows to help boost weight loss.  If you have diabetes or are on diabetic medications do not do this without talking to your physician or healthcare provider.    Diet combined with  exercise helps maintain muscle while optimizing fat loss. Strength training is particularly important for building and maintaining muscle mass. Exercise helps reduce stress, increase energy, and improves fitness. Increasing exercise without diet control, however, may not burn enough calories to loose weight.         Start walking three days a week 10-20 minutes at a time    Work towards walking thirty minutes five days a week      Eventually, increase the speed of your walking for 1-2 minutes at time    In addition, we recommend that you review healthy lifestyles and methods for weight loss available through the National Institutes of Health patient information sites:  http://win.niddk.nih.gov/publications/index.htm    Also look into health and wellness programs that may be available through your health insurance provider, employer, local community center, or [employer].

## 2021-10-02 ENCOUNTER — HEALTH MAINTENANCE LETTER (OUTPATIENT)
Age: 31
End: 2021-10-02

## 2021-10-10 DIAGNOSIS — L68.0 FEMALE HIRSUTISM: ICD-10-CM

## 2021-10-12 RX ORDER — SPIRONOLACTONE 100 MG/1
TABLET, FILM COATED ORAL
Qty: 90 TABLET | Refills: 1 | Status: SHIPPED | OUTPATIENT
Start: 2021-10-12 | End: 2022-04-06

## 2021-12-14 DIAGNOSIS — F41.9 ANXIETY: ICD-10-CM

## 2021-12-17 ENCOUNTER — MYC MEDICAL ADVICE (OUTPATIENT)
Dept: FAMILY MEDICINE | Facility: CLINIC | Age: 31
End: 2021-12-17
Payer: COMMERCIAL

## 2021-12-17 DIAGNOSIS — F41.9 ANXIETY: ICD-10-CM

## 2021-12-17 RX ORDER — CITALOPRAM HYDROBROMIDE 20 MG/1
TABLET ORAL
Qty: 90 TABLET | Refills: 1 | Status: SHIPPED | OUTPATIENT
Start: 2021-12-17 | End: 2022-04-06

## 2022-01-04 ENCOUNTER — MYC MEDICAL ADVICE (OUTPATIENT)
Dept: FAMILY MEDICINE | Facility: CLINIC | Age: 32
End: 2022-01-04
Payer: COMMERCIAL

## 2022-01-05 NOTE — TELEPHONE ENCOUNTER
Hi Dr. Gomez,    My chart message from patient stating she wants her chart updated to reflect her positive home covid test.    Not sure how we would go about that??    Yamila Cespedes RN on 1/5/2022 at 9:39 AM

## 2022-01-22 ENCOUNTER — HEALTH MAINTENANCE LETTER (OUTPATIENT)
Age: 32
End: 2022-01-22

## 2022-04-05 ASSESSMENT — ENCOUNTER SYMPTOMS
SHORTNESS OF BREATH: 0
HEARTBURN: 0
HEADACHES: 0
ARTHRALGIAS: 0
HEMATOCHEZIA: 0
DYSURIA: 0
NAUSEA: 0
MYALGIAS: 0
ABDOMINAL PAIN: 0
BREAST MASS: 0
DIARRHEA: 0
NERVOUS/ANXIOUS: 0
CONSTIPATION: 0
CHILLS: 0
FEVER: 0
FREQUENCY: 0
JOINT SWELLING: 0
EYE PAIN: 0
WEAKNESS: 0
COUGH: 0
DIZZINESS: 0
SORE THROAT: 0
HEMATURIA: 0
PALPITATIONS: 0
PARESTHESIAS: 0

## 2022-04-06 ENCOUNTER — OFFICE VISIT (OUTPATIENT)
Dept: FAMILY MEDICINE | Facility: CLINIC | Age: 32
End: 2022-04-06
Payer: COMMERCIAL

## 2022-04-06 VITALS
HEART RATE: 78 BPM | SYSTOLIC BLOOD PRESSURE: 112 MMHG | TEMPERATURE: 97.2 F | RESPIRATION RATE: 16 BRPM | HEIGHT: 69 IN | DIASTOLIC BLOOD PRESSURE: 77 MMHG | WEIGHT: 180.6 LBS | BODY MASS INDEX: 26.75 KG/M2

## 2022-04-06 DIAGNOSIS — F41.9 ANXIETY: ICD-10-CM

## 2022-04-06 DIAGNOSIS — Z13.228 SCREENING FOR ENDOCRINE, METABOLIC AND IMMUNITY DISORDER: ICD-10-CM

## 2022-04-06 DIAGNOSIS — L68.0 FEMALE HIRSUTISM: ICD-10-CM

## 2022-04-06 DIAGNOSIS — Z13.29 SCREENING FOR ENDOCRINE, METABOLIC AND IMMUNITY DISORDER: ICD-10-CM

## 2022-04-06 DIAGNOSIS — Z13.220 LIPID SCREENING: ICD-10-CM

## 2022-04-06 DIAGNOSIS — Z00.00 WELL ADULT EXAM: Primary | ICD-10-CM

## 2022-04-06 DIAGNOSIS — Z13.0 SCREENING FOR ENDOCRINE, METABOLIC AND IMMUNITY DISORDER: ICD-10-CM

## 2022-04-06 DIAGNOSIS — Z12.4 SCREENING FOR MALIGNANT NEOPLASM OF CERVIX: ICD-10-CM

## 2022-04-06 LAB
ANION GAP SERPL CALCULATED.3IONS-SCNC: 6 MMOL/L (ref 3–14)
BUN SERPL-MCNC: 18 MG/DL (ref 7–30)
CALCIUM SERPL-MCNC: 9.3 MG/DL (ref 8.5–10.1)
CHLORIDE BLD-SCNC: 107 MMOL/L (ref 94–109)
CHOLEST SERPL-MCNC: 161 MG/DL
CO2 SERPL-SCNC: 24 MMOL/L (ref 20–32)
CREAT SERPL-MCNC: 0.81 MG/DL (ref 0.52–1.04)
FASTING STATUS PATIENT QL REPORTED: YES
GFR SERPL CREATININE-BSD FRML MDRD: >90 ML/MIN/1.73M2
GLUCOSE BLD-MCNC: 76 MG/DL (ref 70–99)
HDLC SERPL-MCNC: 81 MG/DL
LDLC SERPL CALC-MCNC: 73 MG/DL
NONHDLC SERPL-MCNC: 80 MG/DL
POTASSIUM BLD-SCNC: 4.3 MMOL/L (ref 3.4–5.3)
SODIUM SERPL-SCNC: 137 MMOL/L (ref 133–144)
TRIGL SERPL-MCNC: 36 MG/DL

## 2022-04-06 PROCEDURE — G0145 SCR C/V CYTO,THINLAYER,RESCR: HCPCS | Performed by: FAMILY MEDICINE

## 2022-04-06 PROCEDURE — 36415 COLL VENOUS BLD VENIPUNCTURE: CPT | Performed by: FAMILY MEDICINE

## 2022-04-06 PROCEDURE — 80048 BASIC METABOLIC PNL TOTAL CA: CPT | Performed by: FAMILY MEDICINE

## 2022-04-06 PROCEDURE — G0124 SCREEN C/V THIN LAYER BY MD: HCPCS | Performed by: PATHOLOGY

## 2022-04-06 PROCEDURE — 87624 HPV HI-RISK TYP POOLED RSLT: CPT | Performed by: FAMILY MEDICINE

## 2022-04-06 PROCEDURE — 99395 PREV VISIT EST AGE 18-39: CPT | Performed by: FAMILY MEDICINE

## 2022-04-06 PROCEDURE — 80061 LIPID PANEL: CPT | Performed by: FAMILY MEDICINE

## 2022-04-06 RX ORDER — SPIRONOLACTONE 100 MG/1
100 TABLET, FILM COATED ORAL DAILY
Qty: 90 TABLET | Refills: 4 | Status: SHIPPED | OUTPATIENT
Start: 2022-04-06 | End: 2023-02-27

## 2022-04-06 RX ORDER — CLONAZEPAM 0.5 MG/1
0.25 TABLET ORAL 2 TIMES DAILY PRN
Qty: 20 TABLET | Refills: 0 | Status: SHIPPED | OUTPATIENT
Start: 2022-04-06 | End: 2022-12-02

## 2022-04-06 RX ORDER — CITALOPRAM HYDROBROMIDE 20 MG/1
20 TABLET ORAL DAILY
Qty: 90 TABLET | Refills: 4 | Status: SHIPPED | OUTPATIENT
Start: 2022-04-06 | End: 2023-02-27

## 2022-04-06 ASSESSMENT — ENCOUNTER SYMPTOMS
ARTHRALGIAS: 0
DIARRHEA: 0
NAUSEA: 0
HEMATOCHEZIA: 0
FEVER: 0
SORE THROAT: 0
CHILLS: 0
NERVOUS/ANXIOUS: 0
DYSURIA: 0
ABDOMINAL PAIN: 0
HEADACHES: 0
FREQUENCY: 0
PARESTHESIAS: 0
HEMATURIA: 0
MYALGIAS: 0
EYE PAIN: 0
WEAKNESS: 0
BREAST MASS: 0
DIZZINESS: 0
JOINT SWELLING: 0
SHORTNESS OF BREATH: 0
PALPITATIONS: 0
HEARTBURN: 0
COUGH: 0
CONSTIPATION: 0

## 2022-04-06 ASSESSMENT — PATIENT HEALTH QUESTIONNAIRE - PHQ9
SUM OF ALL RESPONSES TO PHQ QUESTIONS 1-9: 1
10. IF YOU CHECKED OFF ANY PROBLEMS, HOW DIFFICULT HAVE THESE PROBLEMS MADE IT FOR YOU TO DO YOUR WORK, TAKE CARE OF THINGS AT HOME, OR GET ALONG WITH OTHER PEOPLE: NOT DIFFICULT AT ALL
SUM OF ALL RESPONSES TO PHQ QUESTIONS 1-9: 1

## 2022-04-06 ASSESSMENT — PAIN SCALES - GENERAL: PAINLEVEL: NO PAIN (0)

## 2022-04-06 ASSESSMENT — ANXIETY QUESTIONNAIRES
7. FEELING AFRAID AS IF SOMETHING AWFUL MIGHT HAPPEN: NOT AT ALL
6. BECOMING EASILY ANNOYED OR IRRITABLE: SEVERAL DAYS
2. NOT BEING ABLE TO STOP OR CONTROL WORRYING: NOT AT ALL
7. FEELING AFRAID AS IF SOMETHING AWFUL MIGHT HAPPEN: NOT AT ALL
GAD7 TOTAL SCORE: 2
4. TROUBLE RELAXING: NOT AT ALL
5. BEING SO RESTLESS THAT IT IS HARD TO SIT STILL: NOT AT ALL
1. FEELING NERVOUS, ANXIOUS, OR ON EDGE: SEVERAL DAYS
3. WORRYING TOO MUCH ABOUT DIFFERENT THINGS: NOT AT ALL

## 2022-04-06 NOTE — PROGRESS NOTES
Answers for HPI/ROS submitted by the patient on 4/6/2022  If you checked off any problems, how difficult have these problems made it for you to do your work, take care of things at home, or get along with other people?: Not difficult at all  PHQ9 TOTAL SCORE: 1  NEGRITO 7 TOTAL SCORE: 2       SUBJECTIVE:   CC: Angelita Hobbs is an 31 year old woman who presents for preventive health visit.       Patient has been advised of split billing requirements and indicates understanding: Yes  Healthy Habits:     Getting at least 3 servings of Calcium per day:  Yes    Bi-annual eye exam:  NO    Dental care twice a year:  Yes    Sleep apnea or symptoms of sleep apnea:  None    Diet:  Regular (no restrictions)    Frequency of exercise:  4-5 days/week    Duration of exercise:  45-60 minutes    Taking medications regularly:  Yes    Medication side effects:  None    PHQ-2 Total Score: 0    Additional concerns today:  Yes (Medication refills )          Anxiety Follow-Up    How are you doing with your anxiety since your last visit? Improved     Are you having other symptoms that might be associated with anxiety? No    Have you had a significant life event? No     Are you feeling depressed? No    Do you have any concerns with your use of alcohol or other drugs? No    Social History     Tobacco Use     Smoking status: Never Smoker     Smokeless tobacco: Never Used   Substance Use Topics     Alcohol use: Yes     Comment: socially     Drug use: No     NEGRITO-7 SCORE 11/19/2020 8/19/2021 4/6/2022   Total Score - 5 (mild anxiety) 2 (minimal anxiety)   Total Score 10 5 2     PHQ 11/19/2020 8/19/2021 4/6/2022   PHQ-9 Total Score 1 2 1   Q9: Thoughts of better off dead/self-harm past 2 weeks Not at all Not at all Not at all           Today's PHQ-2 Score:   PHQ-2 ( 1999 [employer]) 4/5/2022   Q1: Little interest or pleasure in doing things 0   Q2: Feeling down, depressed or hopeless 0   PHQ-2 Score 0   PHQ-2 Total Score (12-17 Years)- Positive if 3 or  more points; Administer PHQ-A if positive -   Q1: Little interest or pleasure in doing things Not at all   Q2: Feeling down, depressed or hopeless Not at all   PHQ-2 Score 0       Abuse: Current or Past (Physical, Sexual or Emotional) - No  Do you feel safe in your environment? Yes    Have you ever done Advance Care Planning? (For example, a Health Directive, POLST, or a discussion with a medical provider or your loved ones about your wishes): No, advance care planning information given to patient to review.  Patient declined advance care planning discussion at this time.    Social History     Tobacco Use     Smoking status: Never Smoker     Smokeless tobacco: Never Used   Substance Use Topics     Alcohol use: Yes     Comment: socially     If you drink alcohol do you typically have >3 drinks per day or >7 drinks per week? No    Alcohol Use 4/6/2022   Prescreen: >3 drinks/day or >7 drinks/week? -   Prescreen: >3 drinks/day or >7 drinks/week? No       Reviewed orders with patient.  Reviewed health maintenance and updated orders accordingly - Yes  Labs reviewed in EPIC  Patient Active Problem List   Diagnosis     Secondary amenorrhea     Nexplanon insertion     ASCUS of cervix with negative high risk HPV     Anxiety     Female hirsutism     Past Surgical History:   Procedure Laterality Date     EXCISE MASS BUTTOCK(S) Right 3/7/2019    Procedure: Excision of Right Buttocks Mass;  Surgeon: Carmelina Barr MD;  Location: [facility] OR     HERNIA REPAIR, INGUINAL RT/LT  1996    Right     MOUTH SURGERY  2009    [place] teeth     SOFT TISSUE SURGERY  3/7/19    Mass excision R buttock       Social History     Tobacco Use     Smoking status: Never Smoker     Smokeless tobacco: Never Used   Substance Use Topics     Alcohol use: Yes     Comment: socially     Family History   Problem Relation Age of Onset     Cancer Maternal Grandfather         lung     Heart Disease Maternal Grandfather         valve replaced     Arthritis  Paternal Grandfather      Cancer Paternal Grandfather         skin     Diabetes Paternal Grandmother      Hypertension Father      Anxiety Disorder Maternal Grandmother      Breast Cancer Other      Colon Cancer Other          Current Outpatient Medications   Medication Sig Dispense Refill     citalopram (CELEXA) 20 MG tablet Take 1 tablet (20 mg) by mouth daily 90 tablet 4     clonazePAM (KLONOPIN) 0.5 MG tablet Take 0.5 tablets (0.25 mg) by mouth 2 times daily as needed for anxiety 20 tablet 0     spironolactone (ALDACTONE) 100 MG tablet Take 1 tablet (100 mg) by mouth daily 90 tablet 4     etonogestrel (IMPLANON/NEXPLANON) 68 MG IMPL 1 each by Subdermal route once       No Known Allergies    Breast Cancer Screening:    FHS-7:   Breast CA Risk Assessment (FHS-7) 4/5/2022   Did any of your first-degree relatives have breast or ovarian cancer? No   Did any of your relatives have bilateral breast cancer? No   Did any man in your family have breast cancer? No   Did any woman in your family have breast and ovarian cancer? No   Did any woman in your family have breast cancer before age 50 y? No   Do you have 2 or more relatives with breast and/or ovarian cancer? No   Do you have 2 or more relatives with breast and/or bowel cancer? Yes       Patient under 40 years of age: Routine Mammogram Screening not recommended.   Pertinent mammograms are reviewed under the imaging tab.    History of abnormal Pap smear:   YES - updated in Problem List and Health Maintenance accordingly  Last 3 Pap and HPV Results:   PAP / HPV Latest Ref Rng & Units 4/25/2019 3/8/2016 12/17/2012   PAP (Historical) - ASC-US(A) NIL NIL   HPV16 NEG:Negative Negative Negative -   HPV18 NEG:Negative Negative Negative -   HRHPV NEG:Negative Negative Negative -     PAP / HPV Latest Ref Rng & Units 4/25/2019 3/8/2016 12/17/2012   PAP (Historical) - ASC-US(A) NIL NIL   HPV16 NEG:Negative Negative Negative -   HPV18 NEG:Negative Negative Negative -   HRHPV  "NEG:Negative Negative Negative -     Reviewed and updated as needed this visit by clinical staff   Tobacco  Allergies  Meds  Problems  Med Hx  Surg Hx  Fam Hx  Soc   Hx        Reviewed and updated as needed this visit by Provider   Tobacco  Allergies  Meds  Problems  Med Hx  Surg Hx  Fam Hx         Past Medical History:   Diagnosis Date     Abnormal Pap smear of cervix 04/25/2019    See problem list     Irregular menses      Menarche     age 15      Past Surgical History:   Procedure Laterality Date     EXCISE MASS BUTTOCK(S) Right 3/7/2019    Procedure: Excision of Right Buttocks Mass;  Surgeon: Carmelina Barr MD;  Location: [facility] OR     HERNIA REPAIR, INGUINAL RT/LT  1996    Right     MOUTH SURGERY  2009    [place] teeth     SOFT TISSUE SURGERY  3/7/19    Mass excision R buttock       Review of Systems   Constitutional: Negative for chills and fever.   HENT: Negative for congestion, ear pain, hearing loss and sore throat.    Eyes: Negative for pain and visual disturbance.   Respiratory: Negative for cough and shortness of breath.    Cardiovascular: Negative for chest pain, palpitations and peripheral edema.   Gastrointestinal: Negative for abdominal pain, constipation, diarrhea, heartburn, hematochezia and nausea.   Breasts:  Negative for tenderness, breast mass and discharge.   Genitourinary: Negative for dysuria, frequency, genital sores, hematuria, pelvic pain, urgency, vaginal bleeding and vaginal discharge.   Musculoskeletal: Negative for arthralgias, joint swelling and myalgias.   Skin: Negative for rash.   Neurological: Negative for dizziness, weakness, headaches and paresthesias.   Psychiatric/Behavioral: Negative for mood changes. The patient is not nervous/anxious.           OBJECTIVE:   /77 (BP Location: Right arm, Patient Position: Sitting, Cuff Size: Adult Regular)   Pulse 78   Temp 97.2  F (36.2  C) (Tympanic)   Resp 16   Ht 1.759 m (5' 9.25\")   Wt 81.9 kg (180 lb " 9.6 oz)   BMI 26.48 kg/m    Physical Exam  GENERAL: healthy, alert and no distress  EYES: Eyes grossly normal to inspection, PERRL and conjunctivae and sclerae normal  HENT: normal cephalic/atraumatic and ear canals and TM's normal  NECK: no adenopathy, no asymmetry, masses, or scars and thyroid normal to palpation  RESP: lungs clear to auscultation - no rales, rhonchi or wheezes  BREAST: normal without masses, tenderness or nipple discharge and no palpable axillary masses or adenopathy  CV: regular rate and rhythm, normal S1 S2, no S3 or S4, no murmur, click or rub, no peripheral edema and peripheral pulses strong  ABDOMEN: soft, nontender, no hepatosplenomegaly, no masses and bowel sounds normal   (female): normal female external genitalia, normal urethral meatus, vaginal mucosa pink, moist, well rugated, and normal cervix/adnexa/uterus without masses or discharge  MS: no gross musculoskeletal defects noted, no edema  SKIN: no suspicious lesions or rashes  NEURO: Normal strength and tone, mentation intact and speech normal  PSYCH: mentation appears normal, affect normal/bright    Diagnostic Test Results:  Labs reviewed in Epic    ASSESSMENT/PLAN:   Well adult exam      Anxiety  Well controlled. Refilled medication.     - citalopram (CELEXA) 20 MG tablet; Take 1 tablet (20 mg) by mouth daily  - clonazePAM (KLONOPIN) 0.5 MG tablet; Take 0.5 tablets (0.25 mg) by mouth 2 times daily as needed for anxiety    Female hirsutism  Well controlled. Refilled medication.     - spironolactone (ALDACTONE) 100 MG tablet; Take 1 tablet (100 mg) by mouth daily    Screening for malignant neoplasm of cervix  - Pap Screen with HPV - recommended age 30 - 65 years    Lipid screening  - Lipid panel reflex to direct LDL Fasting; Future    Screening for endocrine, metabolic and immunity disorder  - Basic metabolic panel; Future             COUNSELING:  Reviewed preventive health counseling, as reflected in patient  "instructions    Estimated body mass index is 26.48 kg/m  as calculated from the following:    Height as of this encounter: 1.759 m (5' 9.25\").    Weight as of this encounter: 81.9 kg (180 lb 9.6 oz).    Weight management plan: See AVS.    She reports that she has never smoked. She has never used smokeless tobacco.      Counseling Resources:  ATP IV Guidelines  Pooled Cohorts Equation Calculator  Breast Cancer Risk Calculator  BRCA-Related Cancer Risk Assessment: FHS-7 Tool  FRAX Risk Assessment  ICSI Preventive Guidelines  Dietary Guidelines for Americans, 2010  USDA's MyPlate  ASA Prophylaxis  Lung CA Screening    Gris Gomez MD  [facility]  "

## 2022-04-06 NOTE — PATIENT INSTRUCTIONS
Your BMI is Body mass index is Body mass index is 26.48 kg/m .     A BMI of 18.5 to 24.9 is in the healthy range. A person with a BMI of 25 to 29.9 is considered overweight, and someone with a BMI of 30 or greater is considered obese. More than two-thirds of American adults are considered overweight or obese.  Weight management is a personal decision.  If you are interested in exploring weight loss strategies, the following discussion covers the approaches that may be successful. Body mass index (BMI) is one way to tell whether you are at a healthy weight, overweight, or obese. It measures your weight in relation to your height.  Being overweight or obese increases the risk for further weight gain. Excess weight may lead to heart disease and diabetes.  Creating and following plans for healthy eating and physical activity may help you improve your health.  Weight control is part of healthy lifestyle and includes exercise, emotional health, and healthy eating habits. Careful eating habits lifelong are the mainstay of weight control. Though there are significant health benefits from weight loss, long-term weight loss with diet alone may be very difficult to achieve- studies show long-term success with dietary management alone in less than 10% of people. Attaining a healthy weight may be especially difficult to achieve in those with severe obesity. In some cases, medications, devices and surgical management might be considered.  What can you do?    Keep a food journal to help with mindful eating and finding ways to modify your diet.      Reduce the amount of processed food in your diet. Focus on adding vegetables, and lean proteins.    Reduce dietary carbohydrates. Limiting to  gm of carbohydrates per day has been shows to help boost weight loss.  If you have diabetes or are on diabetic medications do not do this without talking to your physician or healthcare provider.    Diet combined with exercise helps  maintain muscle while optimizing fat loss. Strength training is particularly important for building and maintaining muscle mass. Exercise helps reduce stress, increase energy, and improves fitness. Increasing exercise without diet control, however, may not burn enough calories to loose weight.         Start walking three days a week 10-20 minutes at a time    Work towards walking thirty minutes five days a week      Eventually, increase the speed of your walking for 1-2 minutes at time    In addition, we recommend that you review healthy lifestyles and methods for weight loss available through the National Institutes of Health patient information sites:  http://win.niddk.nih.gov/publications/index.htm    Also look into health and wellness programs that may be available through your health insurance provider, employer, local community center, or [employer].

## 2022-04-07 ASSESSMENT — ANXIETY QUESTIONNAIRES: GAD7 TOTAL SCORE: 2

## 2022-04-07 ASSESSMENT — PATIENT HEALTH QUESTIONNAIRE - PHQ9: SUM OF ALL RESPONSES TO PHQ QUESTIONS 1-9: 1

## 2022-04-12 LAB
BKR LAB AP GYN ADEQUACY: ABNORMAL
BKR LAB AP GYN INTERPRETATION: ABNORMAL
BKR LAB AP HPV REFLEX: ABNORMAL
BKR LAB AP PREVIOUS ABNL DX: ABNORMAL
BKR LAB AP PREVIOUS ABNORMAL: ABNORMAL
PATH REPORT.COMMENTS IMP SPEC: ABNORMAL
PATH REPORT.COMMENTS IMP SPEC: ABNORMAL
PATH REPORT.RELEVANT HX SPEC: ABNORMAL

## 2022-04-14 ENCOUNTER — PATIENT OUTREACH (OUTPATIENT)
Dept: FAMILY MEDICINE | Facility: CLINIC | Age: 32
End: 2022-04-14
Payer: COMMERCIAL

## 2022-04-14 DIAGNOSIS — R87.610 ASCUS OF CERVIX WITH NEGATIVE HIGH RISK HPV: ICD-10-CM

## 2022-04-14 LAB
HUMAN PAPILLOMA VIRUS 16 DNA: NEGATIVE
HUMAN PAPILLOMA VIRUS 18 DNA: NEGATIVE
HUMAN PAPILLOMA VIRUS FINAL DIAGNOSIS: NORMAL
HUMAN PAPILLOMA VIRUS OTHER HR: NEGATIVE

## 2022-05-17 PROCEDURE — U0003 INFECTIOUS AGENT DETECTION BY NUCLEIC ACID (DNA OR RNA); SEVERE ACUTE RESPIRATORY SYNDROME CORONAVIRUS 2 (SARS-COV-2) (CORONAVIRUS DISEASE [COVID-19]), AMPLIFIED PROBE TECHNIQUE, MAKING USE OF HIGH THROUGHPUT TECHNOLOGIES AS DESCRIBED BY CMS-2020-01-R: HCPCS | Performed by: INTERNAL MEDICINE

## 2022-05-18 ENCOUNTER — LAB (OUTPATIENT)
Dept: URGENT CARE | Facility: URGENT CARE | Age: 32
End: 2022-05-18
Attending: FAMILY MEDICINE
Payer: COMMERCIAL

## 2022-05-18 ENCOUNTER — LAB REQUISITION (OUTPATIENT)
Dept: LAB | Facility: CLINIC | Age: 32
End: 2022-05-18

## 2022-05-18 DIAGNOSIS — Z20.822 SUSPECTED 2019 NOVEL CORONAVIRUS INFECTION: ICD-10-CM

## 2022-05-18 LAB
SARS-COV-2 RNA RESP QL NAA+PROBE: NEGATIVE
SARS-COV-2 RNA RESP QL NAA+PROBE: NEGATIVE

## 2022-05-18 PROCEDURE — U0005 INFEC AGEN DETEC AMPLI PROBE: HCPCS

## 2022-05-18 PROCEDURE — U0003 INFECTIOUS AGENT DETECTION BY NUCLEIC ACID (DNA OR RNA); SEVERE ACUTE RESPIRATORY SYNDROME CORONAVIRUS 2 (SARS-COV-2) (CORONAVIRUS DISEASE [COVID-19]), AMPLIFIED PROBE TECHNIQUE, MAKING USE OF HIGH THROUGHPUT TECHNOLOGIES AS DESCRIBED BY CMS-2020-01-R: HCPCS

## 2022-08-09 ENCOUNTER — MYC MEDICAL ADVICE (OUTPATIENT)
Dept: FAMILY MEDICINE | Facility: CLINIC | Age: 32
End: 2022-08-09

## 2022-08-09 DIAGNOSIS — M54.12 CERVICAL RADICULOPATHY: Primary | ICD-10-CM

## 2022-08-16 ENCOUNTER — THERAPY VISIT (OUTPATIENT)
Dept: PHYSICAL THERAPY | Facility: CLINIC | Age: 32
End: 2022-08-16
Attending: FAMILY MEDICINE
Payer: COMMERCIAL

## 2022-08-16 DIAGNOSIS — M54.12 CERVICAL RADICULOPATHY: ICD-10-CM

## 2022-08-16 PROCEDURE — 97161 PT EVAL LOW COMPLEX 20 MIN: CPT | Mod: GP | Performed by: PHYSICAL THERAPIST

## 2022-08-16 PROCEDURE — 97110 THERAPEUTIC EXERCISES: CPT | Mod: GP | Performed by: PHYSICAL THERAPIST

## 2022-08-16 PROCEDURE — 97530 THERAPEUTIC ACTIVITIES: CPT | Mod: GP | Performed by: PHYSICAL THERAPIST

## 2022-08-16 NOTE — PROGRESS NOTES
[facility] Initial Evaluation -- Cervical    Evaluation Date: August 16, 2022  Angelita Hobbs is a 31 year old female with a cervical condition.   Referral: Dr. Gomez  Work mechanical stresses: PT   Employment status: working full time  Leisure mechanical stresses: biking, rock climb, golfing, skiing  Functional disability score (NDI):  30%  VAS score (0-10):3/10   Patient goals/expectations:  Alleviate UE symptoms with sleeping and when waking in the morning, bike without neck and UE symptoms, work without symptoms in the neck and UEs      HISTORY:    Present symptoms:  Tingling inferior to the L scapula, hyper sensitivity to light touch for the L tricep region. Will feel L sided symptoms with L rotation and down the L UE with pinch between scapula, centrally.  Will feel bilateral UE symptoms, median distribution if biking.  Pain quality (sharp/shooting/stabbing/aching/burning/cramping):   Tingling, sharp, general tightness in the neck, UT, upper body.  Paresthesia (yes/no):  no    Present since (onset date): Early June 2022     Symptoms (improving/unchanging/worsening):  worsening    Symptoms commenced as a result of: unknown - gradual onset    Condition occurred in the following environment:  unknown    Symptoms at onset (neck/arm/forearm/headache): neck, upper back ad UEs  Constant symptoms (neck/arm/forearm/headache): tightness in the neck, upper back/upper body  Intermittent symptoms (neck/arm/forearm/headache): neck, upper back, UEs    Symptoms are made worse with the following: Always Bending, Sometimes Sitting, Sometimes Turning - turning to the L/driving, Sometimes Lying (side lying worse than supine/, time of day - Always AM and Sometimes as the day progresses, Sometimes On the move and reaching (biking, working with patients)   Symptoms are made better with the following: Always When still and avoiding aggravating activities    Disturbed sleep (yes/no): possibly  Number of pillows:  1 medium  Sleeping postures (prone/sup/side R/L): sides    Previous episodes (0/1-5/6-10/11+):  Shoulder impingement, scapula winging (R side - high school), L side rib fx due to fall but no lingering symptoms.  Year of first episode: NA    Previous history: no  Previous treatments: Advil intermittently    Specific Questions: (as reported by the patient)  Dizziness/Tinnitus/Nausea/Swallowing (pos/neg): negative  Gait/Upper Limbs (normal/abnormal): normal  Medications (nil/NSAIDS/anlag/steroids/anticoag/other):  NSAIDS (intermittent), anxiety medication and birth control  Medical allergies:  no  General health (excellent/good/fair/poor):  good  Pertinent medical history:  anxiety  Imaging (None/Xray/MRI/Other):  none  Recent or major surgery (yes/no): no  Night pain (yes/no): none  Accidents (yes/no): spring 2022 - over handle bars in May, lateral fall in June 2022 - no neck injuries at those times; lower leg symptoms  Unexplained weight loss (yes/no): no  Barriers at home: no  Other red flags: no    EXAMINATION    Posture:   Sitting (good/fair/poor): fair   Standing (good/fair/poor): good     Protruded head (yes/no): yes intermittently    Wry Neck (right/left/nil):  Nil   Relevant (yes/no):  no     Correction of posture(better/worse/no effect): felt good with support for upper back  Other observations:  none    Neurological:    Motor Deficit:  R thumb extension 3/5, L finger abduction 4+/5, otherwise strong and painfree   Reflexes:  Symmetrical and brisk  Sensory Deficit:  Hypersensitivity posterior arm   Dural signs:  (+) ULTT in sitting, immediate on L, brief delay for R    Movement Loss:   Dom Mod Min Nil Pain   Protrusion    x neck   Flexion    70 Pulling along paraspinals   Retraction   x x Strain base of the head/neck   Extension   40  Increase L UE   Lateral flexion R   42  Stretch on the L UT   Lateral flexion L  28   Inc L UE symptoms   Rotation R   70  Strain on the L   Rotation L  55   Increased symptoms  central upper thoracic     Test Movements:   During: produces, abolishes, increases, decreases, no effect, centralizing, peripheralizing  After: better, worse, no better, no worse, no effect, centralized, peripheralized    Pretest symptoms sitting: generalized tightness neck upper back/chest, 3/10   Symptoms During Symptoms After ROM increased ROM decreased No Effect   PRO        Rep PRO        RET No Effect    No Effect      x   Rep RET Decreases    Better    Increase rotation L, improved R thumb ext MMT     RET EXT Decreases    No Better      x   Rep RET EXT Decreases    Better    Inc ext, inc rot, full MMT R thumb ext and finger abd L  Sensation or ULTT     Pretest symptoms lying:  Not assessed   Symptoms During Symptoms After ROM increased ROM decreased No Effect   RET        Rep RET        RET EXT        Rep RET EXT          If required, pretest symptoms sitting:  Not assessed    Symptoms During Symptoms After ROM increased ROM decreased No Effect   LF-R        Rep LF-R        LF-L        Rep LF-L        ROT-R        Rep ROT-R        ROT-L        Rep ROT-L        FLEX        Rep FLEX            Static Tests:   Protrusion:  Not assessed  Flexion:  Not assessed  Retraction:  Not assessed  Extension (sitting/prone/supine):  Not assessed    Other Tests: none    Provisional Classification:  Derangement - Asymmetrical, unilateral, symptoms below elbow    Principle of Management:  Education:  Proper sitting posture with use of rolled towel to support upper back.  Adjust computer so that she is not looking down; good posture with doing paperwork.  Avoid protrusion, prolonged or repetitive flexion, avoid over reaching with UE at work.  When biking, perform retraction/extension to avoid protrusion.  Discussed bike fit - may need to raise stem so that she has a decrease amount of lumbar flexion and decrease protrusion.  Had patient side ly with rolled towel supporting her neck.  Discussed setting baselines before ex  (CROM, symptoms/tightness, (+) tension sign, sensation) do exercise and reassess.         Equipment provided:  None - rolled towel to support upper back and one for sleeping  Mechanical therapy (Y/N):  yes   Extension principle:  Repeated retraction extension 10 reps, 6-8 times a day   Lateral principle:    Flexion principle:     Other:  Posture correction for sitting, sleeping    ASSESSMENT/PLAN:    Patient is a 31 year old female with cervical complaints.    Patient has the following significant findings with corresponding treatment plan.                Diagnosis 1:  Cervical radiculopathy    Pain -  manual therapy, self management, education, directional preference exercise, home program and modalities if needed  Decreased ROM/flexibility - manual therapy, therapeutic exercise and home program  Decreased joint mobility - manual therapy, therapeutic exercise and home program  Decreased strength - therapeutic exercise, therapeutic activities and home program  Decreased function - therapeutic activities and home program  Impaired posture - neuro re-education, therapeutic activities and home program    Therapy Evaluation Codes:   1) History comprised of:   Personal factors that impact the plan of care:      None.    Comorbidity factors that impact the plan of care are:      None.     Medications impacting care: Anti-inflammatory (prn).  2) Examination of Body Systems comprised of:   Body structures and functions that impact the plan of care:      Cervical spine.   Activity limitations that impact the plan of care are:      Bending, Driving, Lifting, Reading/Computer work, Sleeping, Laying down and reaching, biking.  3) Clinical presentation characteristics are:   Evolving/Changing.  4) Decision-Making    Low complexity using standardized patient assessment instrument and/or measureable assessment of functional outcome.  Cumulative Therapy Evaluation is: Low complexity.    Previous and current functional limitations:   (See Goal Flow Sheet for this information)    Short term and Long term goals: (See Goal Flow Sheet for this information)     Communication ability:  Patient appears to be able to clearly communicate and understand verbal and written communication and follow directions correctly.  Treatment Explanation - The following has been discussed with the patient:   RX ordered/plan of care  Anticipated outcomes  Possible risks and side effects  This patient would benefit from PT intervention to resume normal activities.   Rehab potential is excellent.    Frequency:  1 X week, once daily  Duration:  for 8-12 visits  Discharge Plan:  Achieve all LTG.  Independent in home treatment program.  Reach maximal therapeutic benefit.    Please refer to the daily flowsheet for treatment today, total treatment time and time spent performing 1:1 timed codes.    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

## 2022-08-23 DIAGNOSIS — M54.9 BACK PAIN: Primary | ICD-10-CM

## 2022-08-23 NOTE — TELEPHONE ENCOUNTER
DIAGNOSIS: radicular pain and numbness in hands and now increase and affecting more, uncertain if from neck or other area of spine, ok'd per Carmelina    APPOINTMENT DATE: 08/24/2022   NOTES STATUS DETAILS   OFFICE NOTE from other specialist Internal 08/16/2022 - Beverley Stark PT - [facility]     04/06/2022 - Gris Gomez MD - [facility]   MEDICATION LIST Internal

## 2022-08-24 ENCOUNTER — OFFICE VISIT (OUTPATIENT)
Dept: ORTHOPEDICS | Facility: CLINIC | Age: 32
End: 2022-08-24
Payer: COMMERCIAL

## 2022-08-24 ENCOUNTER — PRE VISIT (OUTPATIENT)
Dept: ORTHOPEDICS | Facility: CLINIC | Age: 32
End: 2022-08-24

## 2022-08-24 VITALS — HEIGHT: 70 IN | BODY MASS INDEX: 27.2 KG/M2 | WEIGHT: 190 LBS

## 2022-08-24 DIAGNOSIS — M54.2 CERVICALGIA: Primary | ICD-10-CM

## 2022-08-24 DIAGNOSIS — R20.0 BILATERAL HAND NUMBNESS: ICD-10-CM

## 2022-08-24 DIAGNOSIS — M54.12 CERVICAL RADICULOPATHY: ICD-10-CM

## 2022-08-24 DIAGNOSIS — M54.2 CERVICAL PAIN: Primary | ICD-10-CM

## 2022-08-24 PROCEDURE — 99204 OFFICE O/P NEW MOD 45 MIN: CPT | Performed by: ORTHOPAEDIC SURGERY

## 2022-08-24 NOTE — LETTER
8/24/2022         RE: Angelita Hobbs  [address]  [place] [address]        Dear Colleague,    Thank you for referring your patient, Angelita Hobbs, to the [facility] [place]. Please see a copy of my visit note below.    Spine Surgery Consultation    REFERRING PHYSICIAN: No ref. provider found   PRIMARY CARE PHYSICIAN: Gris Gomez           Chief Complaint:   Consult (radicular pain and numbness in hands and now increase and affecting more, uncertain if from neck or other area of spine, )      History of Present Illness:  Symptom Profile Including: location of symptoms, onset, severity, exacerbating/alleviating factors, previous treatments:        Angelita Hobbs is a 31 year old female who presents today for concern of acute cervical radiculopathy.  She states that for 2 weeks she has had worsening symptoms of pain rating into the medial inferomedial border of the scapula as well as ulnar nerve paresthesias and numbness.  She does have a history of median nerve symptoms which have typically been noticed when she wakes up in the morning as numbness of the hand which is relieved with shaking her hands out.  She is also had repeated episodes of median nerve numbness with bike riding likely with compression within the carpal tunnel.  She is concerned as over the last 2 weeks she has had more persistent numbness in the ulnar distribution and the radicular pain into the parascapular border.  She does not recall any acute trauma or inciting event.  She has been training for a 50 mile gravel bike ride and has been increasing mileage on her bike recently.  She also reports that she consistently develops ulnar nerve paresthesia/numbness while holding her phone to her ear; she typically uses speaker phone to avoid this.     She has not had an MRI of cervical spine or peripheral nerve conduction study. Has had one appointment with PT which seemed to aggravate his  symptom    Patient denies loss of manual dexterity, loss of balance or coordination, saddle anesthesia, loss of bowel or bladder control.            Past Medical History:     Past Medical History:   Diagnosis Date     Abnormal Pap smear of cervix 04/25/2019    See problem list     Irregular menses      Menarche     age 15            Past Surgical History:     Past Surgical History:   Procedure Laterality Date     EXCISE MASS BUTTOCK(S) Right 3/7/2019    Procedure: Excision of Right Buttocks Mass;  Surgeon: Carmelina Barr MD;  Location: [facility] OR     HERNIA REPAIR, INGUINAL RT/LT  1996    Right     MOUTH SURGERY  2009    [place] teeth     SOFT TISSUE SURGERY  3/7/19    Mass excision R buttock            Social History:     Social History     Tobacco Use     Smoking status: Never Smoker     Smokeless tobacco: Never Used   Substance Use Topics     Alcohol use: Yes     Comment: socially            Family History:     Family History   Problem Relation Age of Onset     Cancer Maternal Grandfather         lung     Heart Disease Maternal Grandfather         valve replaced     Arthritis Paternal Grandfather      Cancer Paternal Grandfather         skin     Diabetes Paternal Grandmother      Hypertension Father      Anxiety Disorder Maternal Grandmother      Breast Cancer Other      Colon Cancer Other             Allergies:   No Known Allergies         Medications:     Current Outpatient Medications   Medication     citalopram (CELEXA) 20 MG tablet     clonazePAM (KLONOPIN) 0.5 MG tablet     etonogestrel (IMPLANON/NEXPLANON) 68 MG IMPL     spironolactone (ALDACTONE) 100 MG tablet     No current facility-administered medications for this visit.             Review of Systems:     A 10 point ROS was performed and reviewed. Specific responses to these questions are noted at the end of the document.         Physical Exam:     PHYSICAL EXAM:  Patient is alert and oriented no acute distress.  Clinical cervical spine  alignment appears neutral with appropriate cervical lordosis.  Range of motion is unrestricted however she does have pain rating to the medial scapular border with maximal extension as well as Spurling's maneuver.  Volitional motor function as measured by resisted strength testing demonstrates overall 5/5 strength but very slightly decreased finger abduction on the left compared to right.  Phalen's is rapidly positive bilaterally and with continued wrist flexion results in paresthesias in the ulnar distribution as well.  At the elbow there is no obvious prominent ulnar nerve or instability nor perching of the ulnar nerve within the cubital tunnel medial epicondyle.  Subjective sensation to gliding light touch is decreased over thenar eminence and thumb and to a lesser degree the index finger. Otherwise intact to gliding light touch.              Imaging:   We ordered and independently reviewed new radiographs at this clinic visit. The results were discussed with the patient.  Findings include:    Overall coronal and sagittal alignment is neutral.  22 degrees of cervical lordosis between inferior C2 and superior T1 endplates. No evidence of spondylosis in cervical spine. Disc spaces and facet joint spaces appear maintained. No olisthesis.              Assessment and Plan:   Assessment:  31 year old female with two weeks of pain radiating to medial scapular border and increasing hand numbness/paresthesias. Given she has pain radiating to periscapular region along with sensory changes MRI is warranted to evaluate for compressive lesion within cervical spine, which would potentially necessitate activity modification, focused physical therapy or other interventions.      There is a good chance this is a combination of peripheral nerve compression and myofascial symptoms of periscapular/posterior cervical spine musculutare corresponding to recent increase in cycling mileage.  That said I believe MRI is warranted in this  case.      We will follow up by virtual visit to review MRI results in 1 week. We have filled out employment forms for workplace limitations.      Time spent on this clinical encounter including previsit chart review, history and physical examination, patient counseling and documentation was 45 minutes on the date of encounter.      Respectfully,  Haresh Lowe MD  Spine Surgery  [facility]

## 2022-08-24 NOTE — PROGRESS NOTES
Spine Surgery Consultation    REFERRING PHYSICIAN: No ref. provider found   PRIMARY CARE PHYSICIAN: Gris Gomez           Chief Complaint:   Consult (radicular pain and numbness in hands and now increase and affecting more, uncertain if from neck or other area of spine, )      History of Present Illness:  Symptom Profile Including: location of symptoms, onset, severity, exacerbating/alleviating factors, previous treatments:        Angelita Hobbs is a 31 year old female who presents today for concern of acute cervical radiculopathy.  She states that for 2 weeks she has had worsening symptoms of pain rating into the medial inferomedial border of the scapula as well as ulnar nerve paresthesias and numbness.  She does have a history of median nerve symptoms which have typically been noticed when she wakes up in the morning as numbness of the hand which is relieved with shaking her hands out.  She is also had repeated episodes of median nerve numbness with bike riding likely with compression within the carpal tunnel.  She is concerned as over the last 2 weeks she has had more persistent numbness in the ulnar distribution and the radicular pain into the parascapular border.  She does not recall any acute trauma or inciting event.  She has been training for a 50 mile gravel bike ride and has been increasing mileage on her bike recently.  She also reports that she consistently develops ulnar nerve paresthesia/numbness while holding her phone to her ear; she typically uses speaker phone to avoid this.     She has not had an MRI of cervical spine or peripheral nerve conduction study. Has had one appointment with PT which seemed to aggravate his symptom    Patient denies loss of manual dexterity, loss of balance or coordination, saddle anesthesia, loss of bowel or bladder control.            Past Medical History:     Past Medical History:   Diagnosis Date     Abnormal Pap smear of cervix 04/25/2019    See problem  list     Irregular menses      Menarche     age 15            Past Surgical History:     Past Surgical History:   Procedure Laterality Date     EXCISE MASS BUTTOCK(S) Right 3/7/2019    Procedure: Excision of Right Buttocks Mass;  Surgeon: Carmelina Barr MD;  Location: [facility] OR     HERNIA REPAIR, INGUINAL RT/LT  1996    Right     MOUTH SURGERY  2009    [place] teeth     SOFT TISSUE SURGERY  3/7/19    Mass excision R buttock            Social History:     Social History     Tobacco Use     Smoking status: Never Smoker     Smokeless tobacco: Never Used   Substance Use Topics     Alcohol use: Yes     Comment: socially            Family History:     Family History   Problem Relation Age of Onset     Cancer Maternal Grandfather         lung     Heart Disease Maternal Grandfather         valve replaced     Arthritis Paternal Grandfather      Cancer Paternal Grandfather         skin     Diabetes Paternal Grandmother      Hypertension Father      Anxiety Disorder Maternal Grandmother      Breast Cancer Other      Colon Cancer Other             Allergies:   No Known Allergies         Medications:     Current Outpatient Medications   Medication     citalopram (CELEXA) 20 MG tablet     clonazePAM (KLONOPIN) 0.5 MG tablet     etonogestrel (IMPLANON/NEXPLANON) 68 MG IMPL     spironolactone (ALDACTONE) 100 MG tablet     No current facility-administered medications for this visit.             Review of Systems:     A 10 point ROS was performed and reviewed. Specific responses to these questions are noted at the end of the document.         Physical Exam:     PHYSICAL EXAM:  Patient is alert and oriented no acute distress.  Clinical cervical spine alignment appears neutral with appropriate cervical lordosis.  Range of motion is unrestricted however she does have pain rating to the medial scapular border with maximal extension as well as Spurling's maneuver.  Volitional motor function as measured by resisted strength  testing demonstrates overall 5/5 strength but very slightly decreased finger abduction on the left compared to right.  Phalen's is rapidly positive bilaterally and with continued wrist flexion results in paresthesias in the ulnar distribution as well.  At the elbow there is no obvious prominent ulnar nerve or instability nor perching of the ulnar nerve within the cubital tunnel medial epicondyle.  Subjective sensation to gliding light touch is decreased over thenar eminence and thumb and to a lesser degree the index finger. Otherwise intact to gliding light touch.              Imaging:   We ordered and independently reviewed new radiographs at this clinic visit. The results were discussed with the patient.  Findings include:    Overall coronal and sagittal alignment is neutral.  22 degrees of cervical lordosis between inferior C2 and superior T1 endplates. No evidence of spondylosis in cervical spine. Disc spaces and facet joint spaces appear maintained. No olisthesis.              Assessment and Plan:   Assessment:  31 year old female with two weeks of pain radiating to medial scapular border and increasing hand numbness/paresthesias. Given she has pain radiating to periscapular region along with sensory changes MRI is warranted to evaluate for compressive lesion within cervical spine, which would potentially necessitate activity modification, focused physical therapy or other interventions.      There is a good chance this is a combination of peripheral nerve compression and myofascial symptoms of periscapular/posterior cervical spine musculutare corresponding to recent increase in cycling mileage.  That said I believe MRI is warranted in this case.      We will follow up by virtual visit to review MRI results in 1 week. We have filled out employment forms for workplace limitations.      Time spent on this clinical encounter including previsit chart review, history and physical examination, patient counseling and  documentation was 45 minutes on the date of encounter.      Respectfully,  Haresh Lowe MD  Spine Surgery  [facility]

## 2022-08-24 NOTE — NURSING NOTE
"Reason For Visit:   Chief Complaint   Patient presents with     Consult     radicular pain and numbness in hands and now increase and affecting more, uncertain if from neck or other area of spine,        Primary MD: Gris Gomez  Ref. MD: Mauricio Hobbs    ?  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    Date of injury: No  Type of injury: had fallen in spring without issue or concern at the time.    Date of surgery: No  Type of surgery: No.    Smoker: No  Request smoking cessation information: No    Ht 1.77 m (5' 9.69\")   Wt 86.2 kg (190 lb)   BMI 27.51 kg/m      Pain Assessment  Patient Currently in Pain: Yes  0-10 Pain Scale: 5  Primary Pain Location: Back    Oswestry (JAKUB) Questionnaire    OSWESTRY DISABILITY INDEX 8/24/2022   Count 9   Sum 19   Oswestry Score (%) 42.22   Some recent data might be hidden        Visual Analog Pain Scale  Back Pain Scale 0-10: 4.5  Right leg pain: 0  Left leg pain: 0  Neck Pain Scale 0-10: 0  Right arm pain: 0  Left arm pain: 0    Promis 10 Assessment    No flowsheet data found.             Carmelina Galdamez LPN  "

## 2022-08-25 ENCOUNTER — MYC MEDICAL ADVICE (OUTPATIENT)
Dept: ORTHOPEDICS | Facility: CLINIC | Age: 32
End: 2022-08-25

## 2022-08-25 ENCOUNTER — THERAPY VISIT (OUTPATIENT)
Dept: PHYSICAL THERAPY | Facility: CLINIC | Age: 32
End: 2022-08-25
Payer: COMMERCIAL

## 2022-08-25 DIAGNOSIS — M54.12 CERVICAL RADICULOPATHY: Primary | ICD-10-CM

## 2022-08-25 PROCEDURE — 97140 MANUAL THERAPY 1/> REGIONS: CPT | Mod: GP | Performed by: PHYSICAL THERAPIST

## 2022-08-25 PROCEDURE — 97110 THERAPEUTIC EXERCISES: CPT | Mod: GP | Performed by: PHYSICAL THERAPIST

## 2022-08-25 RX ORDER — METHYLPREDNISOLONE 4 MG
TABLET, DOSE PACK ORAL
Qty: 21 TABLET | Refills: 0 | Status: SHIPPED | OUTPATIENT
Start: 2022-08-25 | End: 2022-12-02

## 2022-08-30 ENCOUNTER — ANCILLARY PROCEDURE (OUTPATIENT)
Dept: MRI IMAGING | Facility: CLINIC | Age: 32
End: 2022-08-30
Attending: ORTHOPAEDIC SURGERY
Payer: COMMERCIAL

## 2022-08-30 ENCOUNTER — THERAPY VISIT (OUTPATIENT)
Dept: PHYSICAL THERAPY | Facility: CLINIC | Age: 32
End: 2022-08-30
Payer: COMMERCIAL

## 2022-08-30 DIAGNOSIS — M54.12 CERVICAL RADICULOPATHY: ICD-10-CM

## 2022-08-30 DIAGNOSIS — M54.2 CERVICAL PAIN: ICD-10-CM

## 2022-08-30 DIAGNOSIS — M54.12 CERVICAL RADICULOPATHY: Primary | ICD-10-CM

## 2022-08-30 PROCEDURE — 72141 MRI NECK SPINE W/O DYE: CPT | Performed by: RADIOLOGY

## 2022-08-30 PROCEDURE — 97140 MANUAL THERAPY 1/> REGIONS: CPT | Mod: GP | Performed by: PHYSICAL THERAPIST

## 2022-08-30 PROCEDURE — 97110 THERAPEUTIC EXERCISES: CPT | Mod: GP | Performed by: PHYSICAL THERAPIST

## 2022-08-31 ENCOUNTER — VIRTUAL VISIT (OUTPATIENT)
Dept: ORTHOPEDICS | Facility: CLINIC | Age: 32
End: 2022-08-31
Payer: COMMERCIAL

## 2022-08-31 DIAGNOSIS — R20.0 BILATERAL HAND NUMBNESS: Primary | ICD-10-CM

## 2022-08-31 PROCEDURE — 99212 OFFICE O/P EST SF 10 MIN: CPT | Mod: 95 | Performed by: ORTHOPAEDIC SURGERY

## 2022-08-31 NOTE — NURSING NOTE
Reason For Visit:   Chief Complaint   Patient presents with     RECHECK     phone visit [phone], one week follow up And MRI results       Primary MD: Gris Gomez  Ref. MD: Est    ?  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     Date of injury: No  Type of injury: had fallen in spring without issue or concern at the time.     Date of surgery: No  Type of surgery: No.     Smoker: No  Request smoking cessation information: No    There were no vitals taken for this visit.    Pain Assessment  Patient Currently in Pain: Yes  0-10 Pain Scale: 4  Primary Pain Location: Back    Oswestry (JAKUB) Questionnaire    OSWESTRY DISABILITY INDEX 8/24/2022   Count 9   Sum 19   Oswestry Score (%) 42.22   Some recent data might be hidden          Visual Analog Pain Scale  Back Pain Scale 0-10: 1.5  Right leg pain: 0  Left leg pain: 0  Neck Pain Scale 0-10: 1.5  Right arm pain: 0  Left arm pain: 4      Carmelina Galdamez LPN

## 2022-08-31 NOTE — LETTER
8/31/2022         RE: Angelita Hobbs  [address]  [place] [address]        Dear Colleague,    Thank you for referring your patient, Angelita Hobbs, to the [facility] [place]. Please see a copy of my visit note below.    Patient is a 31-year-old female who was last seen by me 1 week ago for evaluation of possible cervical radiculopathy.  Following that visit we ordered MRI of the cervical spine to evaluate for compressive lesions although there was some suspicion this could be peripheral compression neuropathy.  Follow-up today is to review the results of the MRI.  Reviewed her recent history for any myelopathic symptoms and the patient denied all of the question and physical symptoms.    Since her last visit she reports really unchanged symptoms which include bilateral numbness in the C6-7 distribution which could be median nerve as well as unilateral ulnar nerve distribution.  She has been off the bike since that time.  She has been undergoing physical therapy for cervical and periscapular symptoms.  Denies any new myelopathic or radicular symptoms.    Was able to review the MRI imaging with the patient which revealed no focal compressive lesions in the subaxial cervical spine.  I did note and discussed with the patient that she has a decreased septal medullary angle of 130degrees which can be associated with development of myelopathy.      Assessment and plan:  31-year-old female with symptoms which were initially concerning for potential radiculopathy however there is no compressive cervical lesion.  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  In addition I suspect that her periscapular symptoms may have been positional due to her progressively increasing the amount of mileage on a bike.  I discussed my impression with the patient and she was agreeable with this information.  She is in continuing with physical therapy  exercises for parascapular pain. Will fill out return to work documentation when she is ready.   .  Time spent on this clinical encounter including previsit chart review, history and physical examination, patient counseling and documentation was 15 minutes on the date of encounter.      SUNSHINE VERAS MD

## 2022-08-31 NOTE — PROGRESS NOTES
Patient is a 31-year-old female who was last seen by me 1 week ago for evaluation of possible cervical radiculopathy.  Following that visit we ordered MRI of the cervical spine to evaluate for compressive lesions although there was some suspicion this could be peripheral compression neuropathy.  Follow-up today is to review the results of the MRI.  Reviewed her recent history for any myelopathic symptoms and the patient denied all of the question and physical symptoms.    Since her last visit she reports really unchanged symptoms which include bilateral numbness in the C6-7 distribution which could be median nerve as well as unilateral ulnar nerve distribution.  She has been off the bike since that time.  She has been undergoing physical therapy for cervical and periscapular symptoms.  Denies any new myelopathic or radicular symptoms.    Was able to review the MRI imaging with the patient which revealed no focal compressive lesions in the subaxial cervical spine.  I did note and discussed with the patient that she has a decreased septal medullary angle of 130degrees which can be associated with development of myelopathy.      Assessment and plan:  31-year-old female with symptoms which were initially concerning for potential radiculopathy however there is no compressive cervical lesion.  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  In addition I suspect that her periscapular symptoms may have been positional due to her progressively increasing the amount of mileage on a bike.  I discussed my impression with the patient and she was agreeable with this information.  She is in continuing with physical therapy exercises for parascapular pain. Will fill out return to work documentation when she is ready.   .  Time spent on this clinical encounter including previsit chart review, history and physical examination, patient counseling and documentation was 15 minutes on the  date of encounter.

## 2022-09-02 ENCOUNTER — THERAPY VISIT (OUTPATIENT)
Dept: PHYSICAL THERAPY | Facility: CLINIC | Age: 32
End: 2022-09-02
Payer: COMMERCIAL

## 2022-09-02 DIAGNOSIS — M54.12 CERVICAL RADICULOPATHY: Primary | ICD-10-CM

## 2022-09-02 PROCEDURE — 97110 THERAPEUTIC EXERCISES: CPT | Mod: GP | Performed by: PHYSICAL THERAPIST

## 2022-09-02 PROCEDURE — 97530 THERAPEUTIC ACTIVITIES: CPT | Mod: GP | Performed by: PHYSICAL THERAPIST

## 2022-09-02 PROCEDURE — 97140 MANUAL THERAPY 1/> REGIONS: CPT | Mod: GP | Performed by: PHYSICAL THERAPIST

## 2022-09-03 ENCOUNTER — HEALTH MAINTENANCE LETTER (OUTPATIENT)
Age: 32
End: 2022-09-03

## 2022-09-06 ENCOUNTER — THERAPY VISIT (OUTPATIENT)
Dept: PHYSICAL THERAPY | Facility: CLINIC | Age: 32
End: 2022-09-06
Payer: COMMERCIAL

## 2022-09-06 DIAGNOSIS — M54.12 CERVICAL RADICULOPATHY: Primary | ICD-10-CM

## 2022-09-06 PROCEDURE — 97140 MANUAL THERAPY 1/> REGIONS: CPT | Mod: GP | Performed by: PHYSICAL THERAPIST

## 2022-09-06 PROCEDURE — 97110 THERAPEUTIC EXERCISES: CPT | Mod: GP | Performed by: PHYSICAL THERAPIST

## 2022-09-12 ENCOUNTER — THERAPY VISIT (OUTPATIENT)
Dept: PHYSICAL THERAPY | Facility: CLINIC | Age: 32
End: 2022-09-12
Payer: COMMERCIAL

## 2022-09-12 DIAGNOSIS — M54.12 CERVICAL RADICULOPATHY: Primary | ICD-10-CM

## 2022-09-12 PROCEDURE — 97110 THERAPEUTIC EXERCISES: CPT | Mod: GP | Performed by: PHYSICAL THERAPIST

## 2022-09-12 PROCEDURE — 97140 MANUAL THERAPY 1/> REGIONS: CPT | Mod: GP | Performed by: PHYSICAL THERAPIST

## 2022-09-19 ENCOUNTER — VIRTUAL VISIT (OUTPATIENT)
Dept: ORTHOPEDICS | Facility: CLINIC | Age: 32
End: 2022-09-19

## 2022-09-19 DIAGNOSIS — M54.12 CERVICAL RADICULOPATHY: Primary | ICD-10-CM

## 2022-09-19 DIAGNOSIS — M54.2 CERVICAL PAIN: ICD-10-CM

## 2022-09-19 PROCEDURE — 99213 OFFICE O/P EST LOW 20 MIN: CPT | Mod: 95 | Performed by: ORTHOPAEDIC SURGERY

## 2022-09-19 RX ORDER — METHYLPREDNISOLONE 4 MG
TABLET, DOSE PACK ORAL
Qty: 21 TABLET | Refills: 0 | Status: SHIPPED | OUTPATIENT
Start: 2022-09-19 | End: 2022-12-02

## 2022-09-19 NOTE — NURSING NOTE
Reason For Visit:   Chief Complaint   Patient presents with     RECHECK     phone visit [phone], residual arm symptoms. wondering if another medrol dosepak is appropriate.        Primary MD: Gris Gomez  Ref. MD: est    ?  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     Date of injury: No  Type of injury: had fallen in spring without issue or concern at the time.     Date of surgery: No  Type of surgery: No.     Smoker: No  Request smoking cessation information: No    There were no vitals taken for this visit.    Pain Assessment  Patient Currently in Pain: Yes  0-10 Pain Scale: 4  Primary Pain Location: Back    Oswestry (JAKUB) Questionnaire    OSWESTRY DISABILITY INDEX 8/24/2022   Count 9   Sum 19   Oswestry Score (%) 42.22   Some recent data might be hidden            Visual Analog Pain Scale  Back Pain Scale 0-10: 0  Right leg pain: 0  Left leg pain: 0  Neck Pain Scale 0-10: 0  Right arm pain: 0  Left arm pain: 4        Carmelina Galdamez LPN

## 2022-09-19 NOTE — LETTER
9/19/2022     RE: Angelita Hobbs  [address]  [place] [address]    Dear Colleague,    Thank you for referring your patient, Angelita Hobbs, to the [facility]. Please see a copy of my visit note below.    Angelita follows up today regarding recent workability form that she fax number also to report that she is have recurrence of neurologic symptoms.  I when I previously saw her symptoms seem most like a peripheral compressive neuropathy of the ulnar median nerves which I attributed to her marked increase in cycling mileage.  She had a Medrol Dosepak initiate physical therapy which did result in improvement of those symptoms, however over the last week or so she is developed worsening pain which seems to radiate from parascapular region down the posterior brachium consistent with a C7 distribution.  Does not slowly have any focal compression of the nerve roots that would explain the symptoms.  We discussed again that this could be a peripheral compressive neuropathy which might best elucidated by a EMG study.    At this time she would prefer to repeat the Dosepak as this did give her good symptomatic relief she states that overall her symptoms are better however this new posterior upper extremity pain is quite problematic for her.  In addition upon reviewing her MRI after our phone conversation again struck by her increased cervical medullary angle as well as appears to be herniation of the cerebellar tonsils into the foramen magnum consistent with a Chiari.    Impression/Plan  31 year old female with upper extremity neurologic symptoms without associated compressive lesion on MRI cervical spine. Currently treating for peripheral compressive neuropathy including PT and medrol dose pack which provided good relief. More recently having radicular pain to dorsal upper extremity.  Will discuss imaging with my Neurosurgery colleague to determine if she needs to be evaluated for compression of  cerebellar tonsils/brainstem.  Will touch base with Angelita by phone after I hear back.     Time spent on this telephone clinical encounter including previsit chart review, history and physical examination, patient counseling and documentation was 20 minutes on the date of encounter.    Haresh Lowe MD  Orthopedic Spine Surgeon  , Department of Orthopedic Surgery

## 2022-09-19 NOTE — PROGRESS NOTES
Angelita follows up today regarding recent workability form that she fax number also to report that she is have recurrence of neurologic symptoms.  I when I previously saw her symptoms seem most like a peripheral compressive neuropathy of the ulnar median nerves which I attributed to her marked increase in cycling mileage.  She had a Medrol Dosepak initiate physical therapy which did result in improvement of those symptoms, however over the last week or so she is developed worsening pain which seems to radiate from parascapular region down the posterior brachium consistent with a C7 distribution.  Does not slowly have any focal compression of the nerve roots that would explain the symptoms.  We discussed again that this could be a peripheral compressive neuropathy which might best elucidated by a EMG study.    At this time she would prefer to repeat the Dosepak as this did give her good symptomatic relief she states that overall her symptoms are better however this new posterior upper extremity pain is quite problematic for her.  In addition upon reviewing her MRI after our phone conversation again struck by her increased cervical medullary angle as well as appears to be herniation of the cerebellar tonsils into the foramen magnum consistent with a Chiari.    Impression/Plan  31 year old female with upper extremity neurologic symptoms without associated compressive lesion on MRI cervical spine. Currently treating for peripheral compressive neuropathy including PT and medrol dose pack which provided good relief. More recently having radicular pain to dorsal upper extremity.  Will discuss imaging with my Neurosurgery colleague to determine if she needs to be evaluated for compression of cerebellar tonsils/brainstem.  Will touch base with Angelita by phone after I hear back.     Time spent on this telephone clinical encounter including previsit chart review, history and physical examination, patient counseling and  documentation was 20 minutes on the date of encounter.      Haresh Lowe MD  Orthopedic Spine Surgeon  , Department of Orthopedic Surgery

## 2022-09-20 ENCOUNTER — TELEPHONE (OUTPATIENT)
Dept: ORTHOPEDICS | Facility: CLINIC | Age: 32
End: 2022-09-20

## 2022-09-20 NOTE — TELEPHONE ENCOUNTER
RN called and spoke with patient. Told patient it's a good idea to finish the course of medrol dosepak.  If patient's condition worsened, she is to call us back and have a phone visit with Dr. Lowe to discuss things.  Patient agreed.    Hunter Botello RN           [facility]    Phone Message    May a detailed message be left on voicemail: no     Reason for Call: Requesting c/b from Dr Lowe's nurse. Didn't want to say what it was regarding     Action Taken: Message routed to:  [facility] ([facility] [place] [facility]    Travel Screening: Not Applicable

## 2022-09-22 ENCOUNTER — APPOINTMENT (OUTPATIENT)
Dept: MRI IMAGING | Facility: CLINIC | Age: 32
End: 2022-09-22
Attending: EMERGENCY MEDICINE
Payer: COMMERCIAL

## 2022-09-22 ENCOUNTER — HOSPITAL ENCOUNTER (EMERGENCY)
Facility: CLINIC | Age: 32
Discharge: HOME | End: 2022-09-22
Attending: EMERGENCY MEDICINE | Admitting: EMERGENCY MEDICINE
Payer: COMMERCIAL

## 2022-09-22 ENCOUNTER — TELEPHONE (OUTPATIENT)
Dept: NEUROLOGY | Facility: CLINIC | Age: 32
End: 2022-09-22

## 2022-09-22 VITALS
RESPIRATION RATE: 15 BRPM | HEART RATE: 76 BPM | OXYGEN SATURATION: 100 % | DIASTOLIC BLOOD PRESSURE: 83 MMHG | TEMPERATURE: 98 F | SYSTOLIC BLOOD PRESSURE: 135 MMHG

## 2022-09-22 DIAGNOSIS — R20.0 FACIAL NUMBNESS: ICD-10-CM

## 2022-09-22 DIAGNOSIS — M43.6 TORTICOLLIS: ICD-10-CM

## 2022-09-22 LAB
ALBUMIN UR-MCNC: NEGATIVE MG/DL
ANION GAP SERPL CALCULATED.3IONS-SCNC: 15 MMOL/L (ref 7–15)
APPEARANCE CSF: CLEAR
APPEARANCE UR: CLEAR
BACTERIA #/AREA URNS HPF: ABNORMAL /HPF
BASOPHILS # BLD AUTO: 0 10E3/UL (ref 0–0.2)
BASOPHILS NFR BLD AUTO: 0 %
BILIRUB UR QL STRIP: NEGATIVE
BUN SERPL-MCNC: 13.3 MG/DL (ref 6–20)
CALCIUM SERPL-MCNC: 9.9 MG/DL (ref 8.6–10)
CHLORIDE SERPL-SCNC: 99 MMOL/L (ref 98–107)
COLOR CSF: COLORLESS
COLOR UR AUTO: ABNORMAL
CREAT SERPL-MCNC: 0.8 MG/DL (ref 0.51–0.95)
DEPRECATED HCO3 PLAS-SCNC: 23 MMOL/L (ref 22–29)
EOSINOPHIL # BLD AUTO: 0.1 10E3/UL (ref 0–0.7)
EOSINOPHIL NFR BLD AUTO: 1 %
ERYTHROCYTE [DISTWIDTH] IN BLOOD BY AUTOMATED COUNT: 11.9 % (ref 10–15)
GFR SERPL CREATININE-BSD FRML MDRD: >90 ML/MIN/1.73M2
GLUCOSE CSF-MCNC: 60 MG/DL (ref 40–70)
GLUCOSE SERPL-MCNC: 93 MG/DL (ref 70–99)
GLUCOSE UR STRIP-MCNC: NEGATIVE MG/DL
HCT VFR BLD AUTO: 46 % (ref 35–47)
HGB BLD-MCNC: 15 G/DL (ref 11.7–15.7)
HGB UR QL STRIP: NEGATIVE
IMM GRANULOCYTES # BLD: 0 10E3/UL
IMM GRANULOCYTES NFR BLD: 0 %
KETONES UR STRIP-MCNC: NEGATIVE MG/DL
LEUKOCYTE ESTERASE UR QL STRIP: NEGATIVE
LYMPHOCYTES # BLD AUTO: 2.5 10E3/UL (ref 0.8–5.3)
LYMPHOCYTES NFR BLD AUTO: 23 %
MCH RBC QN AUTO: 29.1 PG (ref 26.5–33)
MCHC RBC AUTO-ENTMCNC: 32.6 G/DL (ref 31.5–36.5)
MCV RBC AUTO: 89 FL (ref 78–100)
MONOCYTES # BLD AUTO: 0.8 10E3/UL (ref 0–1.3)
MONOCYTES NFR BLD AUTO: 7 %
NEUTROPHILS # BLD AUTO: 7.5 10E3/UL (ref 1.6–8.3)
NEUTROPHILS NFR BLD AUTO: 69 %
NITRATE UR QL: NEGATIVE
NRBC # BLD AUTO: 0 10E3/UL
NRBC BLD AUTO-RTO: 0 /100
PH UR STRIP: 6 [PH] (ref 5–7)
PLATELET # BLD AUTO: 299 10E3/UL (ref 150–450)
POTASSIUM SERPL-SCNC: 3.8 MMOL/L (ref 3.4–5.3)
PROT CSF-MCNC: 36.6 MG/DL (ref 15–45)
RBC # BLD AUTO: 5.15 10E6/UL (ref 3.8–5.2)
RBC # CSF MANUAL: 7 /UL (ref 0–2)
RBC URINE: <1 /HPF
SODIUM SERPL-SCNC: 137 MMOL/L (ref 136–145)
SP GR UR STRIP: 1.01 (ref 1–1.03)
SQUAMOUS EPITHELIAL: <1 /HPF
TUBE # CSF: 4
UROBILINOGEN UR STRIP-MCNC: NORMAL MG/DL
VZV DNA SPEC QL NAA+PROBE: NOT DETECTED
WBC # BLD AUTO: 11 10E3/UL (ref 4–11)
WBC # CSF MANUAL: 1 /UL (ref 0–5)
WBC URINE: <1 /HPF

## 2022-09-22 PROCEDURE — 87070 CULTURE OTHR SPECIMN AEROBIC: CPT | Performed by: STUDENT IN AN ORGANIZED HEALTH CARE EDUCATION/TRAINING PROGRAM

## 2022-09-22 PROCEDURE — 85025 COMPLETE CBC W/AUTO DIFF WBC: CPT | Performed by: EMERGENCY MEDICINE

## 2022-09-22 PROCEDURE — A9585 GADOBUTROL INJECTION: HCPCS | Performed by: EMERGENCY MEDICINE

## 2022-09-22 PROCEDURE — 70553 MRI BRAIN STEM W/O & W/DYE: CPT | Mod: 26 | Performed by: RADIOLOGY

## 2022-09-22 PROCEDURE — 89050 BODY FLUID CELL COUNT: CPT | Performed by: STUDENT IN AN ORGANIZED HEALTH CARE EDUCATION/TRAINING PROGRAM

## 2022-09-22 PROCEDURE — 87798 DETECT AGENT NOS DNA AMP: CPT | Performed by: STUDENT IN AN ORGANIZED HEALTH CARE EDUCATION/TRAINING PROGRAM

## 2022-09-22 PROCEDURE — 250N000013 HC RX MED GY IP 250 OP 250 PS 637: Performed by: EMERGENCY MEDICINE

## 2022-09-22 PROCEDURE — 70553 MRI BRAIN STEM W/O & W/DYE: CPT

## 2022-09-22 PROCEDURE — 87205 SMEAR GRAM STAIN: CPT | Performed by: STUDENT IN AN ORGANIZED HEALTH CARE EDUCATION/TRAINING PROGRAM

## 2022-09-22 PROCEDURE — 99285 EMERGENCY DEPT VISIT HI MDM: CPT | Mod: 25

## 2022-09-22 PROCEDURE — 99285 EMERGENCY DEPT VISIT HI MDM: CPT | Performed by: EMERGENCY MEDICINE

## 2022-09-22 PROCEDURE — 82310 ASSAY OF CALCIUM: CPT | Performed by: EMERGENCY MEDICINE

## 2022-09-22 PROCEDURE — 82784 ASSAY IGA/IGD/IGG/IGM EACH: CPT | Performed by: STUDENT IN AN ORGANIZED HEALTH CARE EDUCATION/TRAINING PROGRAM

## 2022-09-22 PROCEDURE — 84157 ASSAY OF PROTEIN OTHER: CPT | Performed by: STUDENT IN AN ORGANIZED HEALTH CARE EDUCATION/TRAINING PROGRAM

## 2022-09-22 PROCEDURE — 62270 DX LMBR SPI PNXR: CPT

## 2022-09-22 PROCEDURE — 81001 URINALYSIS AUTO W/SCOPE: CPT | Performed by: EMERGENCY MEDICINE

## 2022-09-22 PROCEDURE — 82945 GLUCOSE OTHER FLUID: CPT | Performed by: STUDENT IN AN ORGANIZED HEALTH CARE EDUCATION/TRAINING PROGRAM

## 2022-09-22 PROCEDURE — 36415 COLL VENOUS BLD VENIPUNCTURE: CPT | Performed by: EMERGENCY MEDICINE

## 2022-09-22 PROCEDURE — 99204 OFFICE O/P NEW MOD 45 MIN: CPT | Mod: 25 | Performed by: STUDENT IN AN ORGANIZED HEALTH CARE EDUCATION/TRAINING PROGRAM

## 2022-09-22 PROCEDURE — 62270 DX LMBR SPI PNXR: CPT | Performed by: STUDENT IN AN ORGANIZED HEALTH CARE EDUCATION/TRAINING PROGRAM

## 2022-09-22 PROCEDURE — 255N000002 HC RX 255 OP 636: Performed by: EMERGENCY MEDICINE

## 2022-09-22 RX ORDER — LIDOCAINE HYDROCHLORIDE 10 MG/ML
INJECTION, SOLUTION EPIDURAL; INFILTRATION; INTRACAUDAL; PERINEURAL
Status: DISCONTINUED
Start: 2022-09-22 | End: 2022-09-22 | Stop reason: HOSPADM

## 2022-09-22 RX ORDER — GADOBUTROL 604.72 MG/ML
10 INJECTION INTRAVENOUS ONCE
Status: COMPLETED | OUTPATIENT
Start: 2022-09-22 | End: 2022-09-22

## 2022-09-22 RX ORDER — ACETAMINOPHEN 500 MG
1000 TABLET ORAL ONCE
Status: COMPLETED | OUTPATIENT
Start: 2022-09-22 | End: 2022-09-22

## 2022-09-22 RX ADMIN — GADOBUTROL 8.5 ML: 604.72 INJECTION INTRAVENOUS at 08:48

## 2022-09-22 RX ADMIN — ACETAMINOPHEN 1000 MG: 500 TABLET ORAL at 09:22

## 2022-09-22 ASSESSMENT — ACTIVITIES OF DAILY LIVING (ADL)
ADLS_ACUITY_SCORE: 35

## 2022-09-22 ASSESSMENT — ENCOUNTER SYMPTOMS
ABDOMINAL PAIN: 0
FEVER: 0
SHORTNESS OF BREATH: 0

## 2022-09-22 NOTE — CONSULTS
[facility]  Neurology Consultation    Patient Name:  Angelita Hobbs  MRN:  [id number]    :  1990  Date of Service:  2022  Primary care provider:  Gris Gomez      Neurology consultation service was asked to see Angelita Hobbs by Dr. Abernathy to evaluate facial numbness.    Chief Complaint: Left sided facial numbness    History of Present Illness:   Angelita Hobbs is a 31 year old female with history of anxiety and a recent workup for cervical radiculopathy who presents with facial numbness. She first noticed left sided facial numbness and left sided tongue numbness when she woke up this morning at 4:30am. The left sided facial numbness is present along the jaw and lower face. The tongue numbness is localized to left of the midline and the anterior 2/3 of the tongue. The right side of her face is normal and rates it as a 10/10, she says the left mandibular region and left tongue are a 5-6/10. She has never experienced symptoms like this before. She states the symptoms have stayed constant since this morning and have not waxed or waned. There is no associated pain and denies a sharp, stabbing feeling.     She also has recently received a work-up for cervical stenosis. The patient reported that about one month ago, she started having bilateral arm paraesthesias originating at the neck and radiating to the hands, left worse than right. These are also associated with winging of the scapula on the left and periscapular numbness. Movement of the neck can trigger shooting pains down the left arm. She does not recall any acute trauma or inciting event, although she is an avid biker. She has followed up with orthopedic surgery who ruled out cervical stenosis via MRI one month ago. She was given a methylprednisolone taper and a referral to PT, which she states has improved her symptoms.     Also of note, two days ago she had a period of shooting pains down her  "spine and legs associated with neck flexion and movement. She woke up in the morning and had acute worsening of her neck stiffness and pain. She noted spinal \"shocking\" and shooting pain originating at the neck and radiating down the entire spine and into both legs. These were severe enough to keep her home from work that day. These symptoms have since resolved.    ROS  A comprehensive ROS was performed and pertinent findings were included in HPI.     PMH  Past Medical History:   Diagnosis Date     Abnormal Pap smear of cervix 04/25/2019    See problem list     Irregular menses      Menarche     age 15     Past Surgical History:   Procedure Laterality Date     EXCISE MASS BUTTOCK(S) Right 3/7/2019    Procedure: Excision of Right Buttocks Mass;  Surgeon: Carmelina Barr MD;  Location: [facility] OR     HERNIA REPAIR, INGUINAL RT/LT  1996    Right     MOUTH SURGERY  2009    [place] teeth     SOFT TISSUE SURGERY  3/7/19    Mass excision R buttock       Medications   I have personally reviewed the patient's medication list.     Allergies  I have personally reviewed the patient's allergy list.     Social History  Per chart review, never smoker, alcohol use socially. Works full time as a PT.     Family History    No family history of disease in mother, father, or sister. She states father's extended family has history of autoimmune disorders including Chron's disease      Physical Examination   Vitals: /83   Pulse 76   Temp 98  F (36.7  C) (Oral)   Resp 15   SpO2 100%   General: Sitting in bed, NAD  Head: NC/AT  Eyes: no icterus, op pink and moist  Cardiac: RRR on the monitor. Extremities warm, no edema.   Respiratory: non-labored on RA  Skin: No rash or lesion on exposed skin  Psych: Mood pleasant, affect congruent  Neuro:  Mental status: Awake, alert, attentive, oriented to self, time, place, and circumstance. Language is fluent and coherent with intact comprehension of complex commands.  Cranial nerves: " VFF, PERRL, conjugate gaze, EOMI, no pain with extraocular eye movements, decreased sensation of left face along maxillary trigeminal region, rated as 6/10 compared to right side, also notes decreased sensation in left anterior 2/3 of the tongue 6/10 compared to right, face symmetric, shoulder shrug strong, tongue/uvula midline, no dysarthria.   Motor: Normal bulk and tone. No abnormal movements. 5/5 strength bilaterally in deltoids, biceps, triceps, hand , hip flexors, hip extensors, knee flexion, knee extension, plantarflexion, dorsiflexion. Winging of left scapula noted with arm extension which can be induced by the patient.  Reflexes: Brisk reflexes in patellae tendons bilaterally, 3 beats of clonus in the left foot, no clonus in right ankle. 1 beat of clonus on the right.  Normo-reflexic and symmetric biceps, brachioradialis, and achilles. Negative Nevarez, toes down-going.  Sensory: No sensory level to pinprick or temperature in the torso. Normal sensation to pinprick and light touch in  upper and lower extremities.  Coordination: FNF and HS without ataxia or dysmetria. Rapid alternating movements intact.   Gait: Deferred    Investigations   I have personally reviewed pertinent labs, tests, and radiological imaging. Discussion of notable findings is included under Impression.     Was patient transferred from outside hospital?   No    Impression  Angelita Hobbs is a 31 year old female with history of anxiety and a recent workup for cervical radiculopathy who presents with facial numbness. MRI Brain was negative for acute intracranial pathology and excluded abnormalities to the 5th cranial nerve. Lumbar puncture was performed and CSF analysis is pending. Her symptoms of a cranial nerve neuropathy, her recent history of cervical radiculopathy, and an episode of shooting spinal pain associated with neck flexion are combined concerning for an inflammatory condition, most notably multiple sclerosis. Her imaging  findings including MRI c-spine one month ago and MRI brain today have been negative, but we question if her steroid course within the past month may have influenced an inflammatory signal. We will follow up on the results of her lumbar puncture and recommend continued evaluation of the etiology of these symptoms.    Recommendations  -MRI brain w and w/o contrast negative for acute intracranial pathology and excluded abnormalities to the 5th cranial nerve  -Lumbar puncture performed, labs including CSF/serum oligoclonal bands pending, neurology will call patient with the results   -Follow-up in Outpatient Neurology Clinic in 1-2 weeks    Thank you for involving Neurology in the care of Angelita Hobbs.  Please do not hesitate to call with questions/concerns (consult pager [phone]).      Patient was seen and discussed with Dr. Parkinson.    Kj Reina MS4  General Neurology Team    Resident/Fellow Attestation   I, Cheko Mera MD, was present with the medical/RAJNI student who participated in the service and in the documentation of the note.  I have verified the history and personally performed the physical exam and medical decision making.  I agree with the assessment and plan of care as documented in the note.      This patient was discussed with the attending faculty, Dr. Parkinson.    Cheko Mera DO  Neurology Resident PGY2  09/22/2022 4:23 PM

## 2022-09-22 NOTE — TELEPHONE ENCOUNTER
[facility] Call Center    Phone Message    May a detailed message be left on voicemail: yes     Reason for Call: Appointment Intake    Referring Provider Name: Bon Burgos MD  Diagnosis and/or Symptoms: Facial numbness [R20.0]        Priority: 1-2 Weeks  Pt currently scheduled 3/1/23. Referral states she should be seen within 1-2weeks. Please call back if anything sooner becomes available.    Action Taken: Other: neurology    Travel Screening: Not Applicable

## 2022-09-22 NOTE — ED NOTES
At 1435 writer brought 4 vile's of CSF fluid and one red/black gel tube to core lab for Oligoclonal Banding test. Specimens given to , who assured writer tubes were correct.

## 2022-09-22 NOTE — PROCEDURES
[facility] Procedure Note          Lumbar Puncture:      Time: 1:22 PM  Performed by: Fadi Parkinson MD  Authorized by: Fadi Parkinson MD    Indications: abnormal neurologic exam and trigeminal neuropathy and Lhermitte's sign.     Consent given by: Patient who states understanding of the procedure being performed after discussing the risks, benefits and alternatives.    Prior to the start of the procedure and with procedural staff participation, I verbally confirmed the patient s identity using two indicators, relevant allergies, that the procedure was appropriate and matched the consent or emergent situation, and that the correct equipment/implants were available. Immediately prior to starting the procedure I conducted the Time Out with the procedural staff and re-confirmed the patient s name, procedure, and site/side. (The Joint Commission universal protocol was followed.) Yes    Under sterile conditions the patient was positioned L Lateral decubitus with knees drawn up. Betadine solution and sterile drapes were utilized.  Local anesthetic at the site: 2 ml of lidocaine 1% without epinephrine from the LP tray  A 21 G  spinal needle was inserted at the L 3-4 interspace.  Opening Pressure 13 cm H2O pressure.  A total of 16 mL of clear and colorless spinal fluid was obtained and sent to the laboratory.   After the needle was removed, a bandaid and pressure were applied and the patient was instructed to stay horizontal for 1 hour.   Complications:  None    Patient tolerance: Patient tolerated the procedure well with no immediate complications.    Fadi Parkinson MD

## 2022-09-22 NOTE — TELEPHONE ENCOUNTER
I called patient and explained that we will put put her on a wait list and call her if we get a cancel. I also let her know that if she has any abnormal findings on her MRI that ordering provider will call our providers and she will be sooner sooner.  I asked pt to call [phone] with any questions.    Yesenia Cardona LPN

## 2022-09-22 NOTE — ED PROVIDER NOTES
ED Provider Note  [facility]      History     Chief Complaint   Patient presents with     Numbness     Pt states that she has numbness in her neck  jaw and tongue on the left side starting around 4am today. States that she has been on Solumedrol dose pack times 2 days for neck pain and stiffness that has been going on times 1 month.     CARMELO  Angelita Hobbs is a 31 year old female who presents to us with a chief complaint of numbness in her left jaw as well as the left side of her tongue.  She denies any other new neurological symptoms.  No injuries or trauma.  No fevers or other systemic symptoms.  Patient has been seen by neurology and been going to physical therapy for some neck pain that is rating into her left arm that also is associated with numbness.  This is not appreciably worsens.  No confusion.  No  ataxia or vision changes.  She is currently on a course of steroids for her neck issues.    Past Medical History  Past Medical History:   Diagnosis Date     Abnormal Pap smear of cervix 04/25/2019    See problem list     Irregular menses      Menarche     age 15     Past Surgical History:   Procedure Laterality Date     EXCISE MASS BUTTOCK(S) Right 3/7/2019    Procedure: Excision of Right Buttocks Mass;  Surgeon: Carmelina Barr MD;  Location: [facility]     HERNIA REPAIR, INGUINAL RT/LT  1996    Right     MOUTH SURGERY  2009    [place]     SOFT TISSUE SURGERY  3/7/19    Mass excision R buttock     citalopram (CELEXA) 20 MG tablet  clonazePAM (KLONOPIN) 0.5 MG tablet  etonogestrel (IMPLANON/NEXPLANON) 68 MG IMPL  methylPREDNISolone (MEDROL DOSEPAK) 4 MG tablet therapy pack  methylPREDNISolone (MEDROL DOSEPAK) 4 MG tablet therapy pack  spironolactone (ALDACTONE) 100 MG tablet      No Known Allergies  Family History  Family History   Problem Relation Age of Onset     Cancer Maternal Grandfather         lung     Heart Disease Maternal Grandfather         valve replaced      Arthritis Paternal Grandfather      Cancer Paternal Grandfather         skin     Diabetes Paternal Grandmother      Hypertension Father      Anxiety Disorder Maternal Grandmother      Breast Cancer Other      Colon Cancer Other      Social History   Social History     Tobacco Use     Smoking status: Never Smoker     Smokeless tobacco: Never Used   Substance Use Topics     Alcohol use: Yes     Comment: socially     Drug use: No      Past medical history, past surgical history, medications, allergies, family history, and social history were reviewed with the patient. No additional pertinent items.       Review of Systems   Constitutional: Negative for fever.   Respiratory: Negative for shortness of breath.    Cardiovascular: Negative for chest pain.   Gastrointestinal: Negative for abdominal pain.   All other systems reviewed and are negative.    A complete review of systems was performed with pertinent positives and negatives noted in the HPI, and all other systems negative.    Physical Exam   BP: 134/88  Pulse: 82  Temp: 98  F (36.7  C)  Resp: 16  SpO2: 100 %  Physical Exam  Constitutional:       General: She is not in acute distress.     Appearance: She is not diaphoretic.   HENT:      Head: Atraumatic.      Mouth/Throat:      Pharynx: No oropharyngeal exudate.   Eyes:      Extraocular Movements: Extraocular movements intact.   Cardiovascular:      Rate and Rhythm: Normal rate and regular rhythm.      Heart sounds: Normal heart sounds.   Pulmonary:      Effort: No respiratory distress.      Breath sounds: Normal breath sounds.   Abdominal:      General: There is no distension.      Palpations: Abdomen is soft.      Tenderness: There is no abdominal tenderness.   Musculoskeletal:         General: No deformity.      Cervical back: Neck supple.   Skin:     General: Skin is warm and dry.   Neurological:      General: No focal deficit present.      Mental Status: She is alert and oriented to person, place, and time.       Cranial Nerves: Cranial nerve deficit present.      Sensory: Sensory deficit present.      Comments: Numbness on the left jaw in the area of distribution of cranial nerve V3.  Numbness to the anterior aspect of the left side of tongue   Psychiatric:         Behavior: Behavior normal.         ED Course      Procedures                Results for orders placed or performed during the hospital encounter of 09/22/22   CBC with platelets differential     Status: None ()    Narrative    The following orders were created for panel order CBC with platelets differential.  Procedure                               Abnormality         Status                     ---------                               -----------         ------                     CBC with platelets and d...[[id number]]                                                   Please view results for these tests on the individual orders.     Medications - No data to display     Assessments & Plan (with Medical Decision Making)   31-year-old female presents to us with a chief complaint of numbness of the left jaw and left arm.  Patient on exam this appears to be involving the mandibular division of cranial nerve V as well as the lingual nerve.  Vital signs today were unremarkable.  Previous records were reviewed.  Discussed with neurology.  They recommended MRI without and with contrast and they will evaluate the patient in the emergency department.  Patient care signed out to the oncoming physician.    I have reviewed the nursing notes. I have reviewed the findings, diagnosis, plan and need for follow up with the patient.    New Prescriptions    No medications on file       Final diagnoses:   Facial numbness       --  Steve Abernathy  [facility] EMERGENCY DEPARTMENT  9/22/2022     Steve Abernathy DO  09/22/22 0702

## 2022-09-23 ENCOUNTER — PATIENT OUTREACH (OUTPATIENT)
Dept: FAMILY MEDICINE | Facility: CLINIC | Age: 32
End: 2022-09-23

## 2022-09-23 ENCOUNTER — TELEPHONE (OUTPATIENT)
Dept: NEUROSURGERY | Facility: CLINIC | Age: 32
End: 2022-09-23

## 2022-09-23 NOTE — TELEPHONE ENCOUNTER
"  ED for acute condition Discharge Protocol    \"Hi, my name is Yamila Cespedes RN, a registered nurse, and I am calling from [facility].  I am calling to follow up and see how things are going for you after your recent emergency visit.\"    Tell me how you are doing now that you are home?\" I'm doing alright, numbness is still present. Intermittent. Currently left jaw is numb. Awaiting test results.       Discharge Instructions    \"Let's review your discharge instructions.  What is/are the follow-up recommendations?  Pt. Response: neuro appt    \"Has an appointment with your primary care provider been scheduled?\"  No (not needed)    Medications    \"Tell me what changed about your medicines when you discharged?\"    Stop prednisone    \"What questions do you have about your medications?\"   None        Call Summary    \"What questions or concerns do you have about your recent visit and your follow-up care?\"     none    \"If you have questions or things don't continue to improve, we encourage you contact us through the main clinic number (give number).  Even if the clinic is not open, triage nurses are available 24/7 to help you.     We would like you to know that our clinic has extended hours (provide information).  We also have urgent care (provide details on closest location and hours/contact info)\"    \"Thank you for your time and take care!\"        Yamila Cespedes RN on 9/23/2022 at 10:00 AM          "

## 2022-09-23 NOTE — TELEPHONE ENCOUNTER
[facility] Call Center    Phone Message    May a detailed message be left on voicemail: yes     Reason for Call: Appointment Intake    Referring Provider Name: Bon Burgos   Diagnosis and/or Symptoms: Facial numbness    Patient is requesting a call back to schedule this neurosurgery referral. Writer unable to schedule due to no notes provided. Please call back at # [phone]    Action Taken: Message routed to:  [facility] Neurosurgery     Travel Screening: Not Applicable

## 2022-09-23 NOTE — TELEPHONE ENCOUNTER
What type of discharge? Emergency Department  Risk of Hospital admission or ED visit: 33%  Is a TCM episode required? No  When should the patient follow up with PCP? 14 days of discharge.    Yasir Grider RN  [facility]

## 2022-09-24 LAB
ALB CSF/SERPL: 4.3 RATIO
ALBUMIN CSF-MCNC: 20 MG/DL
ALBUMIN SERPL-MCNC: 4604 MG/DL
IGG CSF-MCNC: 2 MG/DL
IGG SERPL-MCNC: 980 MG/DL
IGG SYNTH RATE SER+CSF CALC-MRATE: <0 MG/D
IGG/ALB CLEAR SER+CSF-RTO: 0.47 RATIO
IGG/ALB CSF: 0.1 RATIO
OLIGOCLONAL BANDS CSF ELPH-IMP: NEGATIVE
OLIGOCLONAL BANDS CSF ELPH-IMP: NORMAL
OLIGOCLONAL BANDS CSF IEF: 0 BANDS

## 2022-09-26 ENCOUNTER — THERAPY VISIT (OUTPATIENT)
Dept: PHYSICAL THERAPY | Facility: CLINIC | Age: 32
End: 2022-09-26
Payer: COMMERCIAL

## 2022-09-26 DIAGNOSIS — M54.12 CERVICAL RADICULOPATHY: Primary | ICD-10-CM

## 2022-09-26 PROCEDURE — 97140 MANUAL THERAPY 1/> REGIONS: CPT | Mod: GP | Performed by: PHYSICAL THERAPIST

## 2022-09-26 PROCEDURE — 97110 THERAPEUTIC EXERCISES: CPT | Mod: GP | Performed by: PHYSICAL THERAPIST

## 2022-09-26 NOTE — TELEPHONE ENCOUNTER
RECORDS RECEIVED FROM: internal   REASON FOR VISIT: hosp follow-up    Date of Appt: 9/27/22   NOTES (FOR ALL VISITS) STATUS DETAILS   OFFICE NOTE from referring provider Internal SEE INPATIENT NOTES   DISCHARGE REPORT from the ER Internal [facility]:  9/22/22   MEDICATION LIST Internal    IMAGING  (FOR ALL VISITS)     MRI (HEAD, NECK, SPINE) Internal [facility]:  MRI Brain 9/22/22  MRI Cervical Spine 8/30/22

## 2022-09-26 NOTE — TELEPHONE ENCOUNTER
[facility] Call Center    Phone Message    May a detailed message be left on voicemail: yes     Reason for Call: Other: Pt called to inquire on the status on scheduling per referral.  Writer informed Pt that it can take 24 to 48 business hours to reach out to schedule.      Please call Pt back [phone] to schedule.    Action Taken: Message routed to:  [facility] Neurosurgery    Travel Screening: Not Applicable

## 2022-09-27 ENCOUNTER — PRE VISIT (OUTPATIENT)
Dept: NEUROSURGERY | Facility: CLINIC | Age: 32
End: 2022-09-27

## 2022-09-27 ENCOUNTER — OFFICE VISIT (OUTPATIENT)
Dept: NEUROSURGERY | Facility: CLINIC | Age: 32
End: 2022-09-27
Payer: COMMERCIAL

## 2022-09-27 VITALS
OXYGEN SATURATION: 100 % | HEART RATE: 77 BPM | RESPIRATION RATE: 16 BRPM | WEIGHT: 193 LBS | SYSTOLIC BLOOD PRESSURE: 143 MMHG | DIASTOLIC BLOOD PRESSURE: 85 MMHG | BODY MASS INDEX: 27.94 KG/M2

## 2022-09-27 DIAGNOSIS — M54.2 CERVICALGIA: Primary | ICD-10-CM

## 2022-09-27 DIAGNOSIS — M54.12 CERVICAL RADICULOPATHY: ICD-10-CM

## 2022-09-27 DIAGNOSIS — M79.10 MYALGIA: Primary | ICD-10-CM

## 2022-09-27 DIAGNOSIS — R20.0 FACIAL NUMBNESS: ICD-10-CM

## 2022-09-27 LAB
BACTERIA CSF CULT: NO GROWTH
GRAM STAIN RESULT: NORMAL
GRAM STAIN RESULT: NORMAL

## 2022-09-27 PROCEDURE — 99204 OFFICE O/P NEW MOD 45 MIN: CPT | Performed by: NEUROLOGICAL SURGERY

## 2022-09-27 ASSESSMENT — ENCOUNTER SYMPTOMS
PANIC: 0
HEADACHES: 1
NUMBNESS: 1
POOR WOUND HEALING: 0
NERVOUS/ANXIOUS: 1
SKIN CHANGES: 0
MUSCLE WEAKNESS: 1
DEPRESSION: 0
WEAKNESS: 1
STIFFNESS: 1
ARTHRALGIAS: 1
BACK PAIN: 1
DIARRHEA: 1
DECREASED CONCENTRATION: 0
INSOMNIA: 1
NECK PAIN: 1
BLOOD IN STOOL: 1
MYALGIAS: 1
NAIL CHANGES: 0
TINGLING: 1
MUSCLE CRAMPS: 1

## 2022-09-27 ASSESSMENT — PAIN SCALES - GENERAL: PAINLEVEL: MILD PAIN (3)

## 2022-09-27 NOTE — PROGRESS NOTES
Service Date: 2022    [place] [address]    RE:      Angelita Hobbs  MRN:  [id number]  :   1990    Dear Dr. Gomez:    I had the opportunity to see Ms. Hobbs today in my clinic.  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  She had developed symptoms of numbness in her hand following cycling for about an hour.  This numbness extended up in her arms and her palms and then along the ulnar aspect all the way up to her shoulder blades.  This improved with a Medrol Dosepak. However, she developed stiffness in her spine and pain this time, for which she was given another steroid dose pack; however, this made her legs painful as well.  She had undergone emergency room visits where she had been evaluated with an MRI as well as a lumbar puncture.  The MRI of her brain and her cervical spine was negative.  She had subsequently also developed numbness involving the left side of her tongue, the anterior one-third, as well as the angle of her jaw.  This does not extend into V3 or V1.  Currently, the symptoms in her face persist.    NEUROLOGIC EXAMINATION:  She has mild facial asymmetry with actually increased movement in the left side compared to the right.  Tongue is midline.  Uvula is midline.  Extraocular muscle is normal.  Decreased sensation in V3, but normal in V1-V2.  Hearing is grossly intact.  Vision is grossly normal. Strength is 5/5 diffusely.  She has mild numbness subjectively in the C8 distribution. C7 appears to be normal.  She describes the sensation to go almost midline and is symmetrically bilateral.  Her gait and station are normal.    IMAGING:  MRIs of the brain and the cervical spine appear to be normal.  She has been told that she may have a Chiari; however, she has no tonsillar descent at all.  She does not have a history of spina bifida, and it is not apparent at  all that she has any sort of myelomeningocele.    I agree that her diagnosis is somewhat perplexing.  It may be non-neurosurgical in nature and may not be amenable to any surgical intervention; however, on her cervical spine, I believe she has a degree of scoliosis at around the T3 level.  Unfortunately, the images were not meant to look in the thoracic region.  For that reason, I will obtain a thoracic MRI.  Once we know the results, we will be relaying this to her by phone so that we would not need to make an appointment at the current time.  If we need to, we could obtain a CT, also, in that region at a later point if the MRI dictates this.    Thank you very much for allowing me to see this very pleasant patient and her fianceAntonio.    Sincerely,    Luis Zamorano MD        D: 2022   T: 2022   MT: benito    Name:     MAE GARCIAFabrizio  MRN:      [id number]        Account:      [id number]   :      1990           Service Date: 2022       Document: [id number]

## 2022-09-27 NOTE — PATIENT INSTRUCTIONS
Thank you for choosing [facility]. You were seen in the Neurosurgery Clinic today with Dr. Zamorano.    Next steps:  Complete MRI thoracic spine with and without contrast at your earliest convenience. The phone number for radiology is [phone].  We will call you with results.    Please don't hesitate to reach out via [employer] or telephone if you have any questions after your visit.    Jaimie Pierre RN Care Coordinator, Neurosurgery  Clinic: [phone]

## 2022-09-27 NOTE — LETTER
2022       RE: Angelita Hobbs  [address]  [place] [address]     Dear Colleague,    Thank you for referring your patient, Angelita Hobbs, to the [facility]OS[facility] [place] at [facility]. Please see a copy of my visit note below.    Service Date: 2022    Gris Gomez MD   [address] [address]    RE:      Angelita Hobbs  MRN:  [id number]  :   1990    Dear Dr. Gomez:    I had the opportunity to see Ms. Hobbs today in my clinic.  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  She had developed symptoms of numbness in her hand following cycling for about an hour.  This numbness extended up in her arms and her palms and then along the ulnar aspect all the way up to her shoulder blades.  This improved with a Medrol Dosepak. However, she developed stiffness in her spine and pain this time, for which she was given another steroid dose pack; however, this made her legs painful as well.  She had undergone emergency room visits where she had been evaluated with an MRI as well as a lumbar puncture.  The MRI of her brain and her cervical spine was negative.  She had subsequently also developed numbness involving the left side of her tongue, the anterior one-third, as well as the angle of her jaw.  This does not extend into V3 or V1.  Currently, the symptoms in her face persist.    NEUROLOGIC EXAMINATION:  She has mild facial asymmetry with actually increased movement in the left side compared to the right.  Tongue is midline.  Uvula is midline.  Extraocular muscle is normal.  Decreased sensation in V3, but normal in V1-V2.  Hearing is grossly intact.  Vision is grossly normal. Strength is 5/5 diffusely.  She has mild numbness subjectively in the C8 distribution. C7 appears to be normal.  She describes the sensation to go  almost midline and is symmetrically bilateral.  Her gait and station are normal.    IMAGING:  MRIs of the brain and the cervical spine appear to be normal.  She has been told that she may have a Chiari; however, she has no tonsillar descent at all.  She does not have a history of spina bifida, and it is not apparent at all that she has any sort of myelomeningocele.    I agree that her diagnosis is somewhat perplexing.  It may be non-neurosurgical in nature and may not be amenable to any surgical intervention; however, on her cervical spine, I believe she has a degree of scoliosis at around the T3 level.  Unfortunately, the images were not meant to look in the thoracic region.  For that reason, I will obtain a thoracic MRI.  Once we know the results, we will be relaying this to her by phone so that we would not need to make an appointment at the current time.  If we need to, we could obtain a CT, also, in that region at a later point if the MRI dictates this.    Thank you very much for allowing me to see this very pleasant patient and her fianceAntonio.    Sincerely,    Luis Zamorano MD        D: 2022   T: 2022   MT: benito    Name:     MAE GARCIA  MRN:      [id number]        Account:      [id number]   :      1990           Service Date: 2022       Document: [id number]

## 2022-09-29 ENCOUNTER — MYC MEDICAL ADVICE (OUTPATIENT)
Dept: ORTHOPEDICS | Facility: CLINIC | Age: 32
End: 2022-09-29

## 2022-09-29 ENCOUNTER — ANCILLARY PROCEDURE (OUTPATIENT)
Dept: MRI IMAGING | Facility: CLINIC | Age: 32
End: 2022-09-29
Attending: NURSE PRACTITIONER
Payer: COMMERCIAL

## 2022-09-29 ENCOUNTER — TELEPHONE (OUTPATIENT)
Dept: EMERGENCY MEDICINE | Facility: CLINIC | Age: 32
End: 2022-09-29

## 2022-09-29 DIAGNOSIS — M54.2 CERVICALGIA: ICD-10-CM

## 2022-09-29 DIAGNOSIS — M54.12 CERVICAL RADICULOPATHY: ICD-10-CM

## 2022-09-29 PROCEDURE — 72141 MRI NECK SPINE W/O DYE: CPT | Mod: GC | Performed by: STUDENT IN AN ORGANIZED HEALTH CARE EDUCATION/TRAINING PROGRAM

## 2022-09-29 NOTE — TELEPHONE ENCOUNTER
[facility] Emergency Department Lab result notification     Patient/parent Name  Angelita    Reason for call  Patient requesting lab result    Lab Result  Labs are still in process, see below  Current symptoms  9:58AM: Patient states she is the same. Not better, not worse. Has been seeing neurology and is awaiting the last test results from the ED.   Recommendations/Instructions  Advised of the labs that are still in process and that she would be called with any abnormal result.   The patient is working with neurology regarding her symptoms. She is comfortable with the information given and has no further questions.     Contact your PCP clinic or return to the Emergency department if your:    Symptoms worsen or other concerning symptom's.    PCP follow-up Questions asked: YES       Megan Mcgraw RN  [facility]  Emergency Dept Lab Result RN  # [phone]     Copy of Lab result   Oligoclonal banding CSF Tube 3 and Blood  Order: [id number]   Status: In process     Visible to patient: No (not released)     0 Result Notes            Specimen Collected: 09/22/22  2:29 PM Last Resulted: 09/24/22  6:54 PM           Oligoclonal banding CSF Tube 3 and Blood [[id number]] (Order [id number])

## 2022-10-03 ENCOUNTER — TELEPHONE (OUTPATIENT)
Dept: NEUROLOGY | Facility: CLINIC | Age: 32
End: 2022-10-03

## 2022-10-03 NOTE — TELEPHONE ENCOUNTER
I called Ms. Hobbs to inform her of the results of the CSF labs from her lumbar puncture.  This was negative for oligoclonal bands so there is currently no evidence of an inflammatory process within the CNS.  MRI C-spine was again repeated by the neurosurgery team and these results were unremarkable.  I see she has a follow-up appointment with neurosurgery tomorrow.  I informed her from the neurology perspective, the plan would be for her to continue monitoring her symptoms and if there is a significant change then that would be an indication for further work-up or repeat work-up.  All of her questions were answered.    Fadi Parkinson MD

## 2022-10-04 ENCOUNTER — VIRTUAL VISIT (OUTPATIENT)
Dept: NEUROSURGERY | Facility: CLINIC | Age: 32
End: 2022-10-04
Attending: NEUROLOGICAL SURGERY
Payer: COMMERCIAL

## 2022-10-04 VITALS — BODY MASS INDEX: 27.63 KG/M2 | WEIGHT: 193 LBS | HEIGHT: 70 IN

## 2022-10-04 DIAGNOSIS — Q75.8 BASILAR INVAGINATION: ICD-10-CM

## 2022-10-04 DIAGNOSIS — R20.0 BILATERAL HAND NUMBNESS: Primary | ICD-10-CM

## 2022-10-04 PROCEDURE — 99214 OFFICE O/P EST MOD 30 MIN: CPT | Mod: 95 | Performed by: NEUROLOGICAL SURGERY

## 2022-10-04 ASSESSMENT — PAIN SCALES - GENERAL: PAINLEVEL: MODERATE PAIN (5)

## 2022-10-04 NOTE — LETTER
10/4/2022      RE: Angelita Hobbs  [address]  [place] [address]     Dear Colleague,    Thank you for the opportunity to participate in the care of your patient, Angelita Hobbs, at the [facility] at [facility]. Please see a copy of my visit note below.    Angelita Hobbs  is being evaluated via a billable video visit.      How would you like to obtain your AVS? MyChart  For the video visit, send the invitation by: Text to cell phone: [phone]  Will anyone else be joining your video visit? 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  She has been referred by Dr. Haresh Lowe and also saw my colleague and partner, Dr. Zamorano last week.    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  She has noted numbness in her hands while biking for several years.  About 6 weeks ago, while training for a 60 mile bike ride, she experienced worsening hand numbness, extending up her left arm to shoulder blade.  She did see physical therapy for 2 to 3 weeks and this became a little better.  However, things recurred.  She also has noted decreased neck range of motion.  She was treated with a Medrol Dosepak which made her pain significantly better the first time, however this was temporary and it worsened again.  A second Medrol Dosepak actually worsened her pain, which extended to her posterior skull base and occasionally down to her legs.  2 days later she was seen in the emergency department for headaches and numbness that also included her jaw.  A full work-up was done including a lumbar puncture and MRI scan.  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  The LP did not show any  possible cause of her symptoms, including evidence of demyelination.  She has since stopped biking.  She continues to have intermittent headaches, from the base of her skull into her temples, waxing and waning.numbness.  She does note that the pain increases when she looks down, and she has worsened symptoms with walking.    I reviewed several of her imaging studies.  I reviewed her cervical spine MRI scan obtained without contrast on August 30, 2022.  This reveals minimal cervical spondylosis without spinal canal stenosis or neural foraminal narrowing.  Her clival axial angle measured 100 degrees at that time however there was ample cerebrospinal fluid both anterior and posterior to her brainstem.  There was no Chiari I malformation.  I also reviewed her brain MRI scan obtained September 22, 2022, with and without contrast.  This reveals no concern of hydrocephalus, no focal lesion or structural abnormality involving the 5th cranial nerve at the skull base.  No Chiari I malformation.  I reviewed her flexion and extension MRI scan obtained September 29, 2022, again showing mild basilar invagination with a clival axial angle of 100 weeks with no compression of the cervical medullary junction with flexion or extension.  There is no myelopathic cord signal noted.  No other abnormalities are noted.  Her thoracic spine MRI is normal.    Impression symptoms as noted with mild evidence of basilar invagination.    Plan it is unclear to me that the basilar invagination is responsible or even contributing to her symptoms.  We discussed the treatment for this which would involve transnasal odontoidectomy with posterior fusion that would significantly limit her neck rotation and partially reduce neck flexion and extension for the rest of her life.  We would like for her first to undergo further work-up because I am not sure that we have identified the etiology of her symptoms.  She is scheduled to see a neurologist in March of  next year and we will see if this can be moved up.  Would like to obtain EMG and nerve conduction studies to further for possible compressive peripheral neuropathy.        Please do not hesitate to contact me if you have any questions/concerns.     Sincerely,       Tien Moreland MD

## 2022-10-04 NOTE — PROGRESS NOTES
Angelita Hobbs  is being evaluated via a billable video visit.      How would you like to obtain your AVS? Tagito  For the video visit, send the invitation by: Text to cell phone: [phone]  Will anyone else be joining your video visit? No

## 2022-10-04 NOTE — PROGRESS NOTES
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  She has been referred by Dr. Haresh Lowe and also saw my colleague and partner, Dr. Zamorano last week.    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  She has noted numbness in her hands while biking for several years.  About 6 weeks ago, while training for a 60 mile bike ride, she experienced worsening hand numbness, extending up her left arm to shoulder blade.  She did see physical therapy for 2 to 3 weeks and this became a little better.  However, things recurred.  She also has noted decreased neck range of motion.  She was treated with a Medrol Dosepak which made her pain significantly better the first time, however this was temporary and it worsened again.  A second Medrol Dosepak actually worsened her pain, which extended to her posterior skull base and occasionally down to her legs.  2 days later she was seen in the emergency department for headaches and numbness that also included her jaw.  A full work-up was done including a lumbar puncture and MRI scan.  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  The LP did not show any possible cause of her symptoms, including evidence of demyelination.  She has since stopped biking.  She continues to have intermittent headaches, from the base of her skull into her temples, waxing and waning.numbness.  She does note that the pain increases when she looks down, and she has worsened symptoms with walking.    I reviewed several of her imaging studies.  I reviewed her cervical spine MRI scan obtained without contrast on August 30, 2022.  This reveals minimal cervical spondylosis without spinal canal stenosis or neural foraminal narrowing.  Her clival axial angle measured 100 degrees  at that time however there was ample cerebrospinal fluid both anterior and posterior to her brainstem.  There was no Chiari I malformation.  I also reviewed her brain MRI scan obtained September 22, 2022, with and without contrast.  This reveals no concern of hydrocephalus, no focal lesion or structural abnormality involving the 5th cranial nerve at the skull base.  No Chiari I malformation.  I reviewed her flexion and extension MRI scan obtained September 29, 2022, again showing mild basilar invagination with a clival axial angle of 100 weeks with no compression of the cervical medullary junction with flexion or extension.  There is no myelopathic cord signal noted.  No other abnormalities are noted.  Her thoracic spine MRI is normal.    Impression symptoms as noted with mild evidence of basilar invagination.    Plan it is unclear to me that the basilar invagination is responsible or even contributing to her symptoms.  We discussed the treatment for this which would involve transnasal odontoidectomy with posterior fusion that would significantly limit her neck rotation and partially reduce neck flexion and extension for the rest of her life.  We would like for her first to undergo further work-up because I am not sure that we have identified the etiology of her symptoms.  She is scheduled to see a neurologist in March of next year and we will see if this can be moved up.  Would like to obtain EMG and nerve conduction studies to further for possible compressive peripheral neuropathy.

## 2022-10-05 DIAGNOSIS — M54.12 CERVICAL RADICULOPATHY: ICD-10-CM

## 2022-10-05 DIAGNOSIS — M54.2 CERVICALGIA: ICD-10-CM

## 2022-10-05 DIAGNOSIS — R20.0 BILATERAL HAND NUMBNESS: Primary | ICD-10-CM

## 2022-10-06 ENCOUNTER — THERAPY VISIT (OUTPATIENT)
Dept: PHYSICAL THERAPY | Facility: CLINIC | Age: 32
End: 2022-10-06
Payer: COMMERCIAL

## 2022-10-06 DIAGNOSIS — M54.12 CERVICAL RADICULOPATHY: Primary | ICD-10-CM

## 2022-10-06 PROCEDURE — 97530 THERAPEUTIC ACTIVITIES: CPT | Mod: GP | Performed by: PHYSICAL THERAPIST

## 2022-10-06 PROCEDURE — 97140 MANUAL THERAPY 1/> REGIONS: CPT | Mod: GP | Performed by: PHYSICAL THERAPIST

## 2022-10-09 ENCOUNTER — MYC MEDICAL ADVICE (OUTPATIENT)
Dept: FAMILY MEDICINE | Facility: CLINIC | Age: 32
End: 2022-10-09

## 2022-10-09 DIAGNOSIS — M79.18 MYOFASCIAL PAIN: Primary | ICD-10-CM

## 2022-10-10 NOTE — TELEPHONE ENCOUNTER
See my chart message    Requesting referral for PT for myofacial pain    Yamila Cespedes RN on 10/10/2022 at 12:59 PM

## 2022-10-13 NOTE — PROGRESS NOTES
Discharge Note    Progress reporting period is from initial evaluation date (please see noted date below) to Oct 6, 2022.  Linked Episodes   Type: Episode: Status: Noted: Resolved: Last update: Updated by:   PHYSICAL THERAPY cervical radiculopathy 8/16/2022 Active 8/16/2022  10/6/2022  1:48 PM Beverley Stark, PT      Comments:       Please see information below for last relevant information on current status.  Patient seen for 8 visits.    SUBJECTIVE  Subjective changes noted by patient:  Patient relates that she had an appointment with neurosurgery to discuss basilar invagination.  It is unclear if this is the cause of her symptoms thus will be having an EMG, nerve conduction test.   Questions what restrictions she should utilize with return to work and any suggestions with RTW.  Symptoms are felt intermittently in the L scapula, L UE, neck/UT tightness, int HA  Symptoms tend to be mild.  No longer having issues sleeping.  may feel a little arm sym waking in am but they resolve very quickly.    Per e-mail message 10/12/2022 - I am doing pretty well physically. I can sleep well, drive, and work on light duty. Struggling with L chest, thoracic, arm, and face neuro symptoms with activity and full days of work. I am going to see a myofascial specialist PT and PM&R physician to help with these residual symptoms.   Thank you SO MUCH for your help with my neck. I am so much better than I was when I walked in your door - just have a ways more to go.   Current pain level is mild     Previous pain level was  7/10.   Changes in function:  Yes (See Goal flowsheet attached for changes in current functional level)  Adverse reaction to treatment or activity: None    OBJECTIVE  Changes noted in objective findings:   Patient had full rot R, end range tightness with rotation L, nil to min loss B SB and full ext.  Improved tightness after manual therapy to lower cervical spine, soft tissue and myofascial work to anterior  chest, neck, UT.    ULTT has improved to WNL with minimal tightness when in hyperextension of the shoulder during self ULTT.  Good strength throughout B UE  Improved posture    ASSESSMENT/PLAN  Diagnosis: cervical radiculopathy   Updated problem list and treatment plan:   Pain - HEP  Decreased ROM/flexibility - HEP  Decreased function - HEP  Impaired muscle performance - HEP  Impaired posture - HEP  STG/LTGs have been met or progress has been made towards goals:  Yes, please see goal flowsheet for most current information  Assessment of Progress: current status is unknown.    Last current status: Pt is progressing as expected   Self Management Plans:  HEP  I have re-evaluated this patient and find that the nature, scope, duration and intensity of the therapy is appropriate for the medical condition of the patient.  Angelita continues to require the following intervention to meet STG and LTG's:  HEP.    Recommendations:  Discharge with current home program and patient will see care through PM & R, as well as a myofascial PT.    Please refer to the daily flowsheet for treatment today, total treatment time and time spent performing 1:1 timed codes.

## 2022-10-17 ENCOUNTER — THERAPY VISIT (OUTPATIENT)
Dept: PHYSICAL THERAPY | Facility: CLINIC | Age: 32
End: 2022-10-17
Payer: COMMERCIAL

## 2022-10-17 DIAGNOSIS — M54.12 CERVICAL RADICULOPATHY: Primary | ICD-10-CM

## 2022-10-17 PROCEDURE — 97161 PT EVAL LOW COMPLEX 20 MIN: CPT | Mod: GP | Performed by: PHYSICAL THERAPIST

## 2022-10-17 PROCEDURE — 97530 THERAPEUTIC ACTIVITIES: CPT | Mod: GP | Performed by: PHYSICAL THERAPIST

## 2022-10-17 NOTE — PROGRESS NOTES
Physical Therapy Initial Evaluation  Subjective:    Patient Health History  Angelita Hobbs being seen for Cycle fit if gravel bike - B hand, chest, and thoracic numbness.     Problem began: 2022.   Problem occurred: Overuse when training for 60 mile gravel race.   Pain is reported as 5/10 on pain scale.  General health as reported by patient is good.  Pertinent medical history includes: migraines/headaches, numbness/tingling, weakness and other. Other medical history details: Basilar invagination, B hand and foot numbness, neck and back pain with exertion.     Medical allergies: none.   Surgeries include:  None.    Current medications:  Anti-depressants.       Primary job tasks include:  Lifting/carrying, pushing/pulling and repetitive tasks.   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                Subjective:  New bike this spring, never fit, MRI-cervical, thoracic, brain unremarkable (basilar invagination C2 occasionally pushes on brainstem)  No restrictions but endrange cervical motions can be problematic  In winter does nordic and snowboard.  Referral source (MD, PT, DC) PT  Dx cervical radiculopathy  DOI: 22  Type of bike: gravel bike  How long have you been riding this bike: since spring  Have you had a previous bikefit: no  Any recent changes to your bike moved seat down and saddle back, rotated bars upward  Type of pedals: (clipless)  Weekly miles bikin, stopped in end  because of MDs-now cleared  Do you ride year round:( no)-occasional indoor  How would you describe the type of rider you are (races, recreational rider)     Symptomology:   Do you currently have pain (no)   How long have you had the pain since    Frequency of pain intermittent   Description of pain sharp occasionally, most of time a dull ache and progresses with the day  Where is the pain cervical/thoracic  Aggravating factors (other activities outside of riding that causes  pain) and pain scale rating thoracic ext and looking way down or up  How do you provoke the pain with riding(mileage, position)  What value is it on the pain scale?    7/10  Midline thoracic spine, also suboccipital and above ears into temple and down left arm and shoulder blade  Sx management strategies and lowest pain rating change position      Objective:    Off the Bike measures, as indicated  Mobility screen  Thoracic left/right 30 degrees and reported stiffness  Thoracic ext 50% and pain  Cervical PROM full motion with all cardinal plane motions  Flexibility Screen:    Right Left             Hamstring - -   Hip Flexor     ITB/Lat Hip in SL     Quadriceps     Gastroc/ Soleus - -   - = normal  + = mild tightness  ++ = moderate tightness  +++ = significant tightness    Muscle Strength (x/5)    Right Left   Hip Flex     Hip Ext     Hip ABD     Hip ADD     Knee Ext     Knee Flex     Prone Plank         Static bike measures measurements:    Seat Level: measure off  if possible Initial ( -1.5degrees) Post  (No change)               Knee Left/right    Knee flex angle (seat height) 47 degrees/mm seat moved no       Seat Position  Seat Fore/aft ( post position)       Trunk angle 48   Shoulder angle (humerus, T1-7 with axis at GH, goal around 90) 83   Shoulder width (Good width)      left right   Elbow (flexion angle) 15 degrees 15 degrees   Wrist position: (neutral) (neutral)         Dynamic Assessment:    Anterior view:  Knee position/pedal stroke: left right    Top(neutral) bottom( neutral) Top(neutral) bottom( neutral)     Lateral view:  Trunk Position (good position)   Reach (under reaching)   Knee position (over flexed)       Clinical assessment and changes:    Pt presents with neck and upper back pain on a gravel bike that is set up to allow too much cervical endrange motions.  Stem was sized to find a more optimal fit allowing better trunk/shoulder angle.   Drills were provided to increase mobility and  decrease static positioning on the bike and HEP included thread the needle and thoracic ext mobility exercises.  Pt has had a course of PT for spinal care and reports maximizing this care and is referred to a more manual based PT.  Pt may benefit from 1 additional bikefit visit following aquiring and riding 2-3 weeks on new stem.  New stem measurements +15 and 95mm.  System    Physical Exam    General     ROS    Assessment/Plan:    Patient is a 32 year old female with cervical and thoracic complaints.    Patient has the following significant findings with corresponding treatment plan.                Diagnosis 1:  Cervical and thoracic strain  Pain -  hot/cold therapy, manual therapy, self management, education and home program  Decreased function - therapeutic activities  Impaired posture - neuro re-education    Therapy Evaluation Codes:   1) History comprised of:   Personal factors that impact the plan of care:      None.    Comorbidity factors that impact the plan of care are:      None.     Medications impacting care: None.  2) Examination of Body Systems comprised of:   Body structures and functions that impact the plan of care:      Cervical spine and Thoracic Spine.   Activity limitations that impact the plan of care are:      Sports.head movements, looking up/down  3) Clinical presentation characteristics are:   Stable/Uncomplicated.  4) Decision-Making    Low complexity using standardized patient assessment instrument and/or measureable assessment of functional outcome.  Cumulative Therapy Evaluation is: Low complexity.    Previous and current functional limitations:  (See Goal Flow Sheet for this information)    Short term and Long term goals: (See Goal Flow Sheet for this information)     Communication ability:  Patient appears to be able to clearly communicate and understand verbal and written communication and follow directions correctly.  Treatment Explanation - The following has been discussed with the  patient:   RX ordered/plan of care  Anticipated outcomes  Possible risks and side effects  This patient would benefit from PT intervention to resume normal activities.   Rehab potential is good.    Frequency:  1 X a month, once daily  Duration:  for 2 months  Discharge Plan:  Achieve all LTG.  Independent in home treatment program.  Reach maximal therapeutic benefit.    Please refer to the daily flowsheet for treatment today, total treatment time and time spent performing 1:1 timed codes.

## 2022-10-18 ENCOUNTER — THERAPY VISIT (OUTPATIENT)
Dept: PHYSICAL THERAPY | Facility: CLINIC | Age: 32
End: 2022-10-18
Payer: COMMERCIAL

## 2022-10-18 DIAGNOSIS — M54.12 CERVICAL RADICULOPATHY: Primary | ICD-10-CM

## 2022-10-18 DIAGNOSIS — M79.18 MYOFASCIAL PAIN: ICD-10-CM

## 2022-10-18 PROCEDURE — 97161 PT EVAL LOW COMPLEX 20 MIN: CPT | Mod: GP | Performed by: PHYSICAL THERAPIST

## 2022-10-18 PROCEDURE — 97140 MANUAL THERAPY 1/> REGIONS: CPT | Mod: GP | Performed by: PHYSICAL THERAPIST

## 2022-10-18 NOTE — PROGRESS NOTES
Physical Therapy Initial Evaluation  Subjective:    Patient Health History  Angelita Hobbs being seen for Myofascial pain, L upper quarter numbness, loss of spinal ROM.     Problem began: 8/24/2022.   Problem occurred: Increase in biking distance leading to worsening and persisting UE symptoms   Pain is reported as 3/10 on pain scale.  General health as reported by patient is good.  Pertinent medical history includes: numbness/tingling, weakness and other. Other medical history details: Basilar invagination contributing to numbness, weakness, and pain. Hopful to treat symptoms and safely return to work and activity..     Medical allergies: none.   Surgeries include:  None.    Current medications:  Anti-depressants and pain medication (anxiety).    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   Primary job tasks include:  Lifting/carrying, prolonged standing, pushing/pulling, repetitive tasks and other.   Other job/home tasks details: Patient care 8 hours/day..                Therapist Generated HPI Evaluation         Type of problem:  Cervical spine (left scapular and periscaplar).    This is a new condition.      Site of Pain: left upper back/ left periscapular.  Pain is described as aching and is intermittent.  Radiates to: left posterior elbow  Pain is worse in the P.M..  Since onset symptoms are gradually improving.  Associated symptoms:  Numbness and tingling (numbness & tingling: left jaw, left chest, left UE and all fingers). Symptoms are exacerbated by looking up or down (walking, work)  and relieved by heat (supine).    Previous treatment includes physical therapy (none). There was significant improvement following previous treatment.  Restrictions due to condition include:  Working in normal job with restrictions (light duty- no patient care).  Barriers include:  None as reported by patient.        Thoracic MRI:                                                                  Impression: Normal thoracic spine MRI with and without contrast.    Cervical MRI:   Impression:   Mild basilar invagination. No compression of the cervicomedullary  junction with flexion or extension. No myelopathic cord signal. No  Hirayama disease.    Brain MRI: IMPRESSION:  1. No acute intracranial pathology.  2. No focal lesion or structural abnormality along the fifth cranial  nerves at the skull base level.  3. No abnormal intracranial enhancement.                         Objective:  System              Cervical/Thoracic Evaluation    AROM:  AROM Cervical:    Flexion:            Wnl with left chest pain   Extension:       WNL   Rotation:         Left: 85% with left sidebend      Right: 90% with right periscapular pinching  Side Bend:      Left: 50%     Right:  50% with periscapular pan       Headaches: none    DTR's:  not assessed          Cervical Dermatomes:  not assessed                    Cervical Palpation:  : left elevated scapula       Functional Tests:  not assessed    Cervical Stability/Joint Clearing:  not assessed        Cord Sign:  not assessed                                        right sternoclediomastoid & right cervical paraspinal muscle tightness with palpation     General     ROS    Assessment/Plan:    Patient is a 32 year old female with cervical complaints.    Patient has the following significant findings with corresponding treatment plan.                Diagnosis 1:  Myofascial pain   Pain -  hot/cold therapy, manual therapy, self management, education, directional preference exercise and home program  Decreased ROM/flexibility - manual therapy, therapeutic exercise, therapeutic activity and home program  Decreased strength - therapeutic exercise, therapeutic activities and home program  Impaired muscle performance - neuro re-education and home program  Decreased function - therapeutic activities and home program    Therapy Evaluation Codes:   1) Clinical presentation characteristics  are:   Stable/Uncomplicated.  2) Decision-Making    Low complexity using standardized patient assessment instrument and/or measureable assessment of functional outcome.  Cumulative Therapy Evaluation is: Low complexity.    Previous and current functional limitations:  (See Goal Flow Sheet for this information)    Short term and Long term goals: (See Goal Flow Sheet for this information)     Communication ability:  Patient appears to be able to clearly communicate and understand verbal and written communication and follow directions correctly.  Treatment Explanation - The following has been discussed with the patient:   RX ordered/plan of care  Anticipated outcomes  Possible risks and side effects  This patient would benefit from PT intervention to resume normal activities.   Rehab potential is good.    Frequency:  1 X week, once daily  Duration:  for 6 weeks  Discharge Plan:  Achieve all LTG.  Independent in home treatment program.  Reach maximal therapeutic benefit.    Please refer to the daily flowsheet for treatment today, total treatment time and time spent performing 1:1 timed codes.

## 2022-10-21 ENCOUNTER — DOCUMENTATION ONLY (OUTPATIENT)
Dept: ORTHOPEDICS | Facility: CLINIC | Age: 32
End: 2022-10-21

## 2022-10-21 NOTE — PROGRESS NOTES
Received Completed forms Yes   Faxed Forms Faxed To: [email or web address]  Fax Number: [phone]   Sent to HIM (Date) 10/21/22

## 2022-10-24 ASSESSMENT — HEADACHE IMPACT TEST (HIT 6)
HOW OFTEN DID HEADACHS LIMIT CONCENTRATION ON WORK OR DAILY ACTIVITY: VERY OFTEN
HOW OFTEN HAVE YOU FELT TOO TIRED TO WORK BECAUSE OF YOUR HEADACHES: RARELY
HOW OFTEN DO HEADACHES LIMIT YOUR DAILY ACTIVITIES: VERY OFTEN
WHEN YOU HAVE A HEADACHE HOW OFTEN DO YOU WISH YOU COULD LIE DOWN: SOMETIMES
WHEN YOU HAVE HEADACHES HOW OFTEN IS THE PAIN SEVERE: SOMETIMES
HOW OFTEN HAVE YOU FELT FED UP OR IRRITATED BECAUSE OF YOUR HEADACHES: SOMETIMES
HIT6 TOTAL SCORE: 60

## 2022-10-25 ENCOUNTER — THERAPY VISIT (OUTPATIENT)
Dept: PHYSICAL THERAPY | Facility: CLINIC | Age: 32
End: 2022-10-25
Payer: COMMERCIAL

## 2022-10-25 DIAGNOSIS — M54.12 CERVICAL RADICULOPATHY: Primary | ICD-10-CM

## 2022-10-25 DIAGNOSIS — M79.18 MYOFASCIAL PAIN: ICD-10-CM

## 2022-10-25 PROCEDURE — 97112 NEUROMUSCULAR REEDUCATION: CPT | Mod: GP | Performed by: PHYSICAL THERAPIST

## 2022-10-25 PROCEDURE — 97140 MANUAL THERAPY 1/> REGIONS: CPT | Mod: GP | Performed by: PHYSICAL THERAPIST

## 2022-10-26 NOTE — PROGRESS NOTES
Angelita is a 32 year old who is being evaluated via a billable video visit.      How would you like to obtain your AVS? MyChart  If the video visit is dropped, the invitation should be resent by: Text to cell phone: [phone]  Will anyone else be joining your video visit? No        Video-Visit Details    Video Start Time: 11:31 AM    Type of service:  Video Visit    Video End Time:12:17 PM    Originating Location (pt. Location): Other work        Distant Location (provider location):  Off-site    Platform used for Video Visit: [facility]  Headache Neurology Consult  10/26/2022       Name: Angelita Hobbs  MRN#: [id number]  : 1990    Requesting physician:             Assessment and Recommendations:      Ms. Hobbs is a 32 year old female with pmhx of cervical radiculopathy, cervicalgia and anxiety, presenting for headache care. The headache presentation meets criteria for chronic daily headache with a probable secondary headache due to uncertain injury in August, with chronic migraine with sensory aura phenotype, with myofascial and exercise triggers, and notably multifactorial contribution from medication overuse/rebound headaches with use of 600 mg of ibuprofen three times daily for the last month. The history is suspicious for migraine with features such as  severe photophobia, unilateral pain and worsened by activity.The virtual neurologic examination is intact, with reported chronic right facial droop on smile and left jaw reduced sensation on light touch subjectively.    #Chronic daily headache, with possible chronic migraine with sensory aura phenotype    Going forward, we reviewed a symptomatic treatment strategy, focusing on optimizing a preventative treatment.  -For acute treatment of headache, she will try Riztriptan 5-10 mg the onset of headache, with a repeat dose in 2 hours if needed, not to exceed more than 9 days/month to avoid medication overuse.  Discussed adverse side effects such as flushing and not to take if BP is above 160s. She reports a normal blood pressure of 108's systolic, with last documented epic BP shown to be 143, for which she stated was during a time of high stress.  -We discussed limiting use of ibuprofen to less than 14 days/monthly to avoid medication over use or rebound headaches.       The headache frequency and severity warrants prevention treatment.    -We discussed options and adverse effects of preventative options such as propranolol, topiramate and gabapentin (considering her other musculoskeletal triggers and described neuropathy in her hands and arms). Considering the myofascial and musculoskeletal triggers and this impact on her work, we discussed starting with gabapentin 300 mg TID, to increase 300 mg each week (one dose at a time), to total 600 mg TID, with recommend a 6 to 8-week trial at the maximum tolerated dose prior to determining effectiveness.    For her potential exacerbating symptoms, may consider other options should above headache treatment fail to provide adequate relief. These could include: Occipital Nerve Blocks (diagnostic and therapeutic), or  Muscle Relaxants       Plan:  - Start gabapentin 300 mg TID, to increase 300 mg each week (one dose at a time), to total 600 mg TID,  - Riztriptan 10 mg the onset of headache, with a repeat dose in 2 hours if needed, not to exceed more than 9 days/month   - Continue with PT  - Plan to follow up in ~3 months         The patient was seen virtually and discussed with Dr. Mesa, who agrees with my assessment and plan     Leta Whitlock DO, ANDREE  PGY-3 Neurology Resident    Physician Attestation   I, Olesya Mesa MD, saw this patient and agree with the findings and plan of care as documented in the note.      Olesya Mesa MD             Chief Complaint:    History is obtained from the patient and medical record.      Ms. Hobbs is a 32 year old female with pmhx  of cervical radiculopathy, cervicalgia and anxiety, presenting for headache care.     Upon talking with Ms. Hobbs this morning she reports, she started training for a bike race in August and noticed a gradual paraesthesias in her hands while biking and noticed more paraesthesias in her arms while at work. She has been doing PT and improved neck range of motion improvement, yet she has persistent face, chest, head numbness intermittently which is keeping her from working full time. 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 In regards to headaches, around noon daily, she reports a numbness that feels tingly and non-painful in her jaw, bottom lip, as well as left half of tongue, which radiates on her skin to her eyebrow that progresses to a headache suboccipitally bilaterally, more on the left than the right, this is followed by photophobia. She is able to reproduce this numbness with pressure to her most superior SCM on the left. Looking up and down and any quick neck movements can trigger the headaches. She then will get numbness down her back to her inferior scapula. She describes the headache pain as dull ache with occasional left shoulder spasm. The pain is rated at a 5/10. This lasts until she gets home from work, relieved with laying flat on her back and try to release her left muscles with pressure, with eyes closed and relaxation. The headaches occur most provoked at work. If she is not at work, she might get a headache only based on activity. She has no headache free days. Before August she denied headaches, only if she drank too much alcohol or coffee.   The headaches are worsened by exercise, especially the next day. No trauma or injury recently.   The paraesthesias in her hands on the bike rides started about 3 weeks prior to  onset of headache, otherwise no change of vision, or visual floaters. The headaches have not change in character.    She has been using 600 mg TID ibuprofen, without effect on headache, for the last 1 month.          Of note, from chart review she was recently seen by Neurosurgery for worsening b/l hand numbness during 60 mile bike ride training and treated with two medrol dosepaks, one of which worsened her pain and then developed a posterior skull headache and numbness into her jaw. A work up of MRI and LP were completed,  LP did not show any possible cause of her symptoms, including evidence of demyelination. She continues to have intermittent headaches, from the base of her skull into her temples, waxing and waning.numbness.  From the notes, she does note that the pain increases when she looks down, and she has worsened symptoms with walking.     Cervical spine MRI scan from August 2022, with some cervical spondylosis without spinal canal stenosis or neural foraminal narrowing, without myelopathic cord signal .and thoracic spine MRI is reported as normal. And MRI brain completed in September, with no hydrocephalus, no focal lesion or structural abnormality involving the 5th cranial nerve and no Chiari I malformation.  Additionally in her F/E MRI reviewed from Dr. 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 Moreland mentions uncertainty if this is the cause of her symptoms and suggested further workup prior to surgical intervention           Past Medical History:     Past Medical History:   Diagnosis Date     Abnormal Pap smear of cervix 04/25/2019    See problem list     Irregular menses      Menarche     age 15             Past Surgical History:     Past Surgical History:   Procedure Laterality Date     EXCISE MASS BUTTOCK(S) Right 3/7/2019    Procedure:  "Excision of Right Buttocks Mass;  Surgeon: Carmelina Barr MD;  Location: [facility] OR     HERNIA REPAIR, INGUINAL RT/LT  1996    Right     MOUTH SURGERY  2009    [place] teeth     SOFT TISSUE SURGERY  3/7/19    Mass excision R buttock              Social History:     Social History     Tobacco Use     Smoking status: Never     Smokeless tobacco: Never   Substance Use Topics     Alcohol use: Yes     Comment: socially     Drug use: No      She works as  a physical therapist   Etoh: 0 recently (since August 0, otherwise 4 beers a week)  Caffeine- 1 cup of coffee daily, 8oz  Sleep-\"great!\" 8-9 hours          Family History:   I have reviewed this patient's family history and updated it with pertinent information if needed.  Family History   Problem Relation Age of Onset     Cancer Maternal Grandfather         lung     Heart Disease Maternal Grandfather         valve replaced     Arthritis Paternal Grandfather      Cancer Paternal Grandfather         skin     Diabetes Paternal Grandmother      Hypertension Father      Anxiety Disorder Maternal Grandmother      Breast Cancer Other      Colon Cancer Other       Grandmother with migraines, no other headache history        Examination:       General: Cooperative, NAD, patient sitting in chair at home  Neurologic:  Mental Status: Fully alert, attentive and oriented. Speech clear and fluent.   Cranial Nerves:EOMI intact without nystagmus, and no conjunctival injection  Facial movements symmetric. Tongue protrudes midline, hearing intact to our conversation, asymmetry on smile with right facial droop slightly on smile, currently 50% decreased sensation over mandible to subjective finger  Motor: No abnormal movements.  able to stand without use of arms, no tremors or drift in UE.   Sensation: light touch reported intact on face and arms and top of legs. pROM of cervical spine normal in all motions  Coordination: F2N without ataxia with outstretched arms. Normal finger " tapping b/l.            Data:      MRI brain 9/2022                                      IMPRESSION:  1. No acute intracranial pathology.  2. No focal lesion or structural abnormality along the fifth cranial  nerves at the skull base level.  3. No abnormal intracranial enhancement.    MRI C Spine 9/2022  Impression:   Mild basilar invagination. No compression of the cervicomedullary  junction with flexion or extension. No myelopathic cord signal. No  Hirayama disease.

## 2022-10-27 ENCOUNTER — TELEPHONE (OUTPATIENT)
Dept: NEUROLOGY | Facility: CLINIC | Age: 32
End: 2022-10-27

## 2022-10-27 ENCOUNTER — VIRTUAL VISIT (OUTPATIENT)
Dept: NEUROLOGY | Facility: CLINIC | Age: 32
End: 2022-10-27
Payer: COMMERCIAL

## 2022-10-27 DIAGNOSIS — G43.709 CHRONIC MIGRAINE WITHOUT AURA WITHOUT STATUS MIGRAINOSUS, NOT INTRACTABLE: Primary | ICD-10-CM

## 2022-10-27 PROCEDURE — 99214 OFFICE O/P EST MOD 30 MIN: CPT | Mod: 95 | Performed by: PSYCHIATRY & NEUROLOGY

## 2022-10-27 RX ORDER — GABAPENTIN 300 MG/1
600 CAPSULE ORAL 3 TIMES DAILY
Qty: 180 CAPSULE | Refills: 3 | Status: SHIPPED | OUTPATIENT
Start: 2022-10-27 | End: 2023-03-20

## 2022-10-27 RX ORDER — RIZATRIPTAN BENZOATE 10 MG/1
10 TABLET ORAL
Qty: 18 TABLET | Refills: 11 | Status: SHIPPED | OUTPATIENT
Start: 2022-10-27 | End: 2023-10-13

## 2022-10-27 ASSESSMENT — MIGRAINE DISABILITY ASSESSMENT (MIDAS)
HOW OFTEN WERE SOCIAL ACTIVITIES MISSED DUE TO HEADACHES: 7
HOW MANY DAYS IN THE PAST 3 MONTHS HAVE YOU HAD A HEADACHE: 60
TOTAL SCORE: 47
HOW MANY DAYS DID YOU MISS WORK OR SCHOOL BECAUSE OF HEADACHES: 10
HOW MANY DAYS WAS HOUSEWORK PRODUCTIVITY CUT IN HALF DUE TO HEADACHES: 10
HOW MANY DAYS WAS YOUR PRODUCTIVITY CUT IN HALF BECAUSE OF HEADACHES: 10
ON A SCALE FROM 0-10 ON AVERAGE HOW PAINFUL WERE HEADACHES: 5
HOW MANY DAYS DID YOU NOT DO HOUSEWORK BECAUSE OF HEADACHES: 10

## 2022-10-27 NOTE — PATIENT INSTRUCTIONS
Thank you for visiting the headache clinic today, plan from our visit:    Start gabapentin 300 mg TID, to increase 300 mg each week (one dose at a time), to total 600 mg TID  Rizatriptan 10 mg the onset of headache, with a repeat dose in 2 hours if needed, not to exceed more than 9 days/month   Work to limit use of ibuprofen to less than 14 days/monthly to avoid medication over use or rebound headaches.   Continue with PT    - Plan to follow up/check in ~3 months with Dr. Mesa

## 2022-10-27 NOTE — LETTER
10/27/2022         RE: Angelita Hobbs  [address]  [place] [address]        Dear Colleague,    Thank you for referring your patient, Angelita Hobbs, to the [facility]UROLOG[facility]. Please see a copy of my visit note below.    Angelita is a 32 year old who is being evaluated via a billable video visit.      How would you like to obtain your AVS? MyChart  If the video visit is dropped, the invitation should be resent by: Text to cell phone: [phone]  Will anyone else be joining your video visit? No        Video-Visit Details    Video Start Time: 11:31 AM    Type of service:  Video Visit    Video End Time:12:17 PM    Originating Location (pt. Location): Other work        Distant Location (provider location):  Off-site    Platform used for Video Visit: [facility]  Headache Neurology Consult  10/26/2022       Name: Angelita Hobbs  MRN#: [id number]  : 1990    Requesting physician:             Assessment and Recommendations:      Ms. Hobbs is a 32 year old female with pmhx of cervical radiculopathy, cervicalgia and anxiety, presenting for headache care. The headache presentation meets criteria for chronic daily headache with a probable secondary headache due to uncertain injury in August, with chronic migraine with sensory aura phenotype, with myofascial and exercise triggers, and notably multifactorial contribution from medication overuse/rebound headaches with use of 600 mg of ibuprofen three times daily for the last month. The history is suspicious for migraine with features such as  severe photophobia, unilateral pain and worsened by activity.The virtual neurologic examination is intact, with reported chronic right facial droop on smile and left jaw reduced sensation on light touch subjectively.    #Chronic daily headache, with possible chronic migraine with sensory aura phenotype    Going forward, we reviewed a symptomatic treatment  strategy, focusing on optimizing a preventative treatment.  -For acute treatment of headache, she will try Riztriptan 5-10 mg the onset of headache, with a repeat dose in 2 hours if needed, not to exceed more than 9 days/month to avoid medication overuse. Discussed adverse side effects such as flushing and not to take if BP is above 160s. She reports a normal blood pressure of 108's systolic, with last documented epic BP shown to be 143, for which she stated was during a time of high stress.  -We discussed limiting use of ibuprofen to less than 14 days/monthly to avoid medication over use or rebound headaches.       The headache frequency and severity warrants prevention treatment.    -We discussed options and adverse effects of preventative options such as propranolol, topiramate and gabapentin (considering her other musculoskeletal triggers and described neuropathy in her hands and arms). Considering the myofascial and musculoskeletal triggers and this impact on her work, we discussed starting with gabapentin 300 mg TID, to increase 300 mg each week (one dose at a time), to total 600 mg TID, with recommend a 6 to 8-week trial at the maximum tolerated dose prior to determining effectiveness.    For her potential exacerbating symptoms, may consider other options should above headache treatment fail to provide adequate relief. These could include: Occipital Nerve Blocks (diagnostic and therapeutic), or  Muscle Relaxants       Plan:  - Start gabapentin 300 mg TID, to increase 300 mg each week (one dose at a time), to total 600 mg TID,  - Riztriptan 10 mg the onset of headache, with a repeat dose in 2 hours if needed, not to exceed more than 9 days/month   - Continue with PT  - Plan to follow up in ~3 months         The patient was seen virtually and discussed with Dr. Mesa, who agrees with my assessment and plan     Leta Whitlock DO, ANDREE  PGY-3 Neurology Resident    Physician Attestation   Olesya DIAZ,  MD, saw this patient and agree with the findings and plan of care as documented in the note.      Olesya Mesa MD             Chief Complaint:    History is obtained from the patient and medical record.      Ms. Hobbs is a 32 year old female with pmhx of cervical radiculopathy, cervicalgia and anxiety, presenting for headache care.     Upon talking with Ms. Hobbs this morning she reports, she started training for a bike race in August and noticed a gradual paraesthesias in her hands while biking and noticed more paraesthesias in her arms while at work. She has been doing PT and improved neck range of motion improvement, yet she has persistent face, chest, head numbness intermittently which is keeping her from working full time. 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 In regards to headaches, around noon daily, she reports a numbness that feels tingly and non-painful in her jaw, bottom lip, as well as left half of tongue, which radiates on her skin to her eyebrow that progresses to a headache suboccipitally bilaterally, more on the left than the right, this is followed by photophobia. She is able to reproduce this numbness with pressure to her most superior SCM on the left. Looking up and down and any quick neck movements can trigger the headaches. She then will get numbness down her back to her inferior scapula. She describes the headache pain as dull ache with occasional left shoulder spasm. The pain is rated at a 5/10. This lasts until she gets home from work, relieved with laying flat on her back and try to release her left muscles with pressure, with eyes closed and relaxation. The headaches occur most provoked at work. If she is not at work, she might get a headache only based on activity. She has no headache free days.  Before August she denied headaches, only if she drank too much alcohol or coffee.   The headaches are worsened by exercise, especially the next day. No trauma or injury recently.   The paraesthesias in her hands on the bike rides started about 3 weeks prior to onset of headache, otherwise no change of vision, or visual floaters. The headaches have not change in character.    She has been using 600 mg TID ibuprofen, without effect on headache, for the last 1 month.          Of note, from chart review she was recently seen by Neurosurgery for worsening b/l hand numbness during 60 mile bike ride training and treated with two medrol dosepaks, one of which worsened her pain and then developed a posterior skull headache and numbness into her jaw. A work up of MRI and LP were completed,  LP did not show any possible cause of her symptoms, including evidence of demyelination. She continues to have intermittent headaches, from the base of her skull into her temples, waxing and waning.numbness.  From the notes, she does note that the pain increases when she looks down, and she has worsened symptoms with walking.     Cervical spine MRI scan from August 2022, with some cervical spondylosis without spinal canal stenosis or neural foraminal narrowing, without myelopathic cord signal .and thoracic spine MRI is reported as normal. And MRI brain completed in September, with no hydrocephalus, no focal lesion or structural abnormality involving the 5th cranial nerve and no Chiari I malformation.  Additionally in her F/E MRI reviewed from Dr. 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 Moreland mentions uncertainty if this is the cause of her symptoms and suggested further workup prior to surgical intervention           Past Medical History:     Past Medical History:   Diagnosis  "Date     Abnormal Pap smear of cervix 04/25/2019    See problem list     Irregular menses      Menarche     age 15             Past Surgical History:     Past Surgical History:   Procedure Laterality Date     EXCISE MASS BUTTOCK(S) Right 3/7/2019    Procedure: Excision of Right Buttocks Mass;  Surgeon: Carmelina Barr MD;  Location: [facility] OR     HERNIA REPAIR, INGUINAL RT/LT  1996    Right     MOUTH SURGERY  2009    [place] teeth     SOFT TISSUE SURGERY  3/7/19    Mass excision R buttock              Social History:     Social History     Tobacco Use     Smoking status: Never     Smokeless tobacco: Never   Substance Use Topics     Alcohol use: Yes     Comment: socially     Drug use: No      She works as  a physical therapist   Etoh: 0 recently (since August 0, otherwise 4 beers a week)  Caffeine- 1 cup of coffee daily, 8oz  Sleep-\"great!\" 8-9 hours          Family History:   I have reviewed this patient's family history and updated it with pertinent information if needed.  Family History   Problem Relation Age of Onset     Cancer Maternal Grandfather         lung     Heart Disease Maternal Grandfather         valve replaced     Arthritis Paternal Grandfather      Cancer Paternal Grandfather         skin     Diabetes Paternal Grandmother      Hypertension Father      Anxiety Disorder Maternal Grandmother      Breast Cancer Other      Colon Cancer Other       Grandmother with migraines, no other headache history        Examination:       General: Cooperative, NAD, patient sitting in chair at home  Neurologic:  Mental Status: Fully alert, attentive and oriented. Speech clear and fluent.   Cranial Nerves:EOMI intact without nystagmus, and no conjunctival injection  Facial movements symmetric. Tongue protrudes midline, hearing intact to our conversation, asymmetry on smile with right facial droop slightly on smile, currently 50% decreased sensation over mandible to subjective finger  Motor: No abnormal " movements.  able to stand without use of arms, no tremors or drift in UE.   Sensation: light touch reported intact on face and arms and top of legs. pROM of cervical spine normal in all motions  Coordination: F2N without ataxia with outstretched arms. Normal finger tapping b/l.            Data:      MRI brain 9/2022                                      IMPRESSION:  1. No acute intracranial pathology.  2. No focal lesion or structural abnormality along the fifth cranial  nerves at the skull base level.  3. No abnormal intracranial enhancement.    MRI C Spine 9/2022  Impression:   Mild basilar invagination. No compression of the cervicomedullary  junction with flexion or extension. No myelopathic cord signal. No  Hirayama disease.      Again, thank you for allowing me to participate in the care of your patient.        Sincerely,        Olesya Mesa MD

## 2022-10-28 ENCOUNTER — OFFICE VISIT (OUTPATIENT)
Dept: NEUROLOGY | Facility: CLINIC | Age: 32
End: 2022-10-28
Attending: NURSE PRACTITIONER
Payer: COMMERCIAL

## 2022-10-28 DIAGNOSIS — M89.8X1 PERISCAPULAR PAIN: ICD-10-CM

## 2022-10-28 DIAGNOSIS — R20.0 BILATERAL HAND NUMBNESS: Primary | ICD-10-CM

## 2022-10-28 DIAGNOSIS — M54.2 CERVICALGIA: ICD-10-CM

## 2022-10-28 PROCEDURE — 95912 NRV CNDJ TEST 11-12 STUDIES: CPT | Performed by: PHYSICAL MEDICINE & REHABILITATION

## 2022-10-28 PROCEDURE — 95886 MUSC TEST DONE W/N TEST COMP: CPT | Performed by: PHYSICAL MEDICINE & REHABILITATION

## 2022-10-28 NOTE — LETTER
10/28/2022       RE: Angelita Hobbs  [address]  [place] [address]         Dear Colleague,    Thank you for referring your patient, Angelita Hobbs, to the [facility] [place] at [facility]. Please see a copy of my visit note below.                        [facility]  Electrodiagnostic Laboratory                 Department of Neurology                                                                                                         Test Date:  10/28/2022    Patient: Angelita Hobbs : 1990 Physician: Lisa Santiago MD   Sex: Female AGE: 32 year Ref Phys: Melina Cervantes NP   ID#: [id number]   Technician: Romaine Elkins       Clinical Information:  Angelita Hobbs is a 31 yo female who presents with neck pain and pain at periscapular areas, worse on the left side, associated with intermittent paresthesia in her hands. Query peripheral mononeuropathy vs cervical radiculopathy.     Techniques:  Motor and sensory conduction studies were done with surface recording electrodes. EMG was done with a concentric needle electrode.     Results:  All nerve conduction studies (as indicated in the following tables) were within normal limits.      All examined muscles (as indicated in the following table) showed no evidence of electrical instability.        Interpretation:  Normal examination.    --There is no electrodiagnostic evidence of median or ulnar mononeuropathy in either upper extremity.    --There is no electrodiagnostic evidence of left upper extremity motor radiculopathy or plexopathy.      ___________________________  Lisa Santiago MD        Nerve Conduction Studies  Motor Sites      Latency Amplitude Neg. Amp Diff Segment Distance Velocity Neg. Dur Neg Area Diff Temperature Comment   Site (ms) Norm (mV) Norm %  cm m/s Norm ms %  C    Left Median (APB) Motor   Wrist 3.0  < 4.4 9.6  > 5.0  Wrist-APB 8   5.0  34.7    Elbow  6.8 - 9.7 - 1.04 Elbow-Wrist 22.7 60  > 48 5.0 0 34.7    Right Median (APB) Motor   Wrist 3.0  < 4.4 8.3  > 5.0  Wrist-APB 8   5.2  35.5    Elbow 7.1 - 8.0 - -3.6 Elbow-Wrist 23.5 57  > 48 5.4 -3.2 35.5    Left Median/Ulnar (Lumb-Dors Int II) Motor        Median (Lumb I)   Wrist 2.7 - 1.38 -      6.2  34.1         Ulnar (Dorsal Int (manus))   Wrist 2.3 - 1.63 -      5.7  34.2    Right Median/Ulnar (Lumb-Dors Int II) Motor        Median (Lumb I)   Wrist 2.9 - 3.0 -      5.9  35.3         Ulnar (Dorsal Int (manus))   Wrist 2.5 - 5.0 -      4.9  35.3    Left Ulnar (ADM) Motor   Wrist 2.2  < 3.5 12.2  > 5.0  Wrist-ADM 8   4.6  33.7    Bel Elbow 5.3 - 11.4 - -6.6 Bel Elbow-Wrist 19.5 63  > 48 4.8 -1.26 33.6    Abv Elbow 7.1 - 11.4 - 0 Abv Elbow-Bel Elbow 12.5 69  > 48 4.7 -1.60 33.5    Right Ulnar (ADM) Motor   Wrist 2.4  < 3.5 13.5  > 5.0  Wrist-ADM 8   4.7  33.7    Bel Elbow 5.5 - 13.1 - -3.0 Bel Elbow-Wrist 19.8 64  > 48 4.8 -0.93 33.6    Abv Elbow 7.0 - 12.9 - -1.53 Abv Elbow-Bel Elbow 9.8 65  > 48 4.8 -1.56 33.5      Sensory Sites      Onset Lat Peak Lat Amp (O-P) Amp (P-P) Segment Distance Velocity Temperature Comment   Site ms ms  V Norm  V  cm m/s Norm  C    Left Median Sensory   Wrist-Dig II 2.1 2.6 75  > 10 92 Wrist-Dig II 14 67  > 48 31    Right Median Sensory   Wrist-Dig II 2.2 2.8 51  > 10 71 Wrist-Dig II 14 64  > 48 33.9    Left Median-Ulnar Palmar Sensory        Median   Palm-Wrist 1.33 1.75 51 - 62 Palm-Wrist 8 60 - 32.6         Ulnar   Palm-Wrist 1.00 1.48 41 - 52 Palm-Wrist 8 80 - 32.9    Right Median-Ulnar Palmar Sensory        Median   Palm-Wrist 1.50 1.90 46 - 54 Palm-Wrist 8 53 - 34.8         Ulnar   Palm-Wrist 1.18 1.68 21 - 26 Palm-Wrist 8 68 - 35    Left Ulnar Sensory   Wrist-Dig V 2.2 2.7 35  > 8 36 Wrist-Dig V 12.5 57  > 48 30.8    Right Ulnar Sensory   Wrist-Dig V 2.1 2.7 24  > 8 33 Wrist-Dig V 12.5 60  > 48 34.2      Inter-Nerve Comparisons     Nerve 1 Value 1 Nerve 2 Value 2 Parameter Result  Normal   Sensory Sites   R Median Palm-Wrist 1.9 ms R Ulnar Palm-Wrist 1.7 ms Peak Lat Diff 0.22 ms <0.40   L Median Palm-Wrist 1.8 ms L Ulnar Palm-Wrist 1.5 ms Peak Lat Diff 0.27 ms <0.40       Electromyography     Side Muscle Ins Act Fibs/PSW Fasc HF Amp Dur Poly Recrt Int Pat   Left Deltoid Nml None Nml 0 Nml Nml 0 Nml Nml   Left Biceps Nml None Nml 0 Nml Nml 0 Nml Nml   Left Triceps Nml None Nml 0 Nml Nml 0 Nml Nml   Left Pronator Teres Nml None Nml 0 Nml Nml 0 Nml Nml   Left FDI Nml None Nml 0 Nml Nml 0 Nml Nml   Left EIP Nml None Nml 0 Nml Nml 0 Nml Nml         NCS Waveforms:    Motor                    Sensory                            Again, thank you for allowing me to participate in the care of your patient.      Sincerely,    Lisa Santiago MD

## 2022-10-28 NOTE — PROGRESS NOTES
[facility]  Electrodiagnostic Laboratory                 Department of Neurology                                                                                                         Test Date:  10/28/2022    Patient: Angelita Hobbs : 1990 Physician: Lisa Santiago MD   Sex: Female AGE: 32 year Ref Phys: Melina Cervantes NP   ID#: [id number]   Technician: Romaine Elkins       Clinical Information:  Angelita Hobbs is a 31 yo female who presents with neck pain and pain at periscapular areas, worse on the left side, associated with intermittent paresthesia in her hands. Query peripheral mononeuropathy vs cervical radiculopathy.     Techniques:  Motor and sensory conduction studies were done with surface recording electrodes. EMG was done with a concentric needle electrode.     Results:  All nerve conduction studies (as indicated in the following tables) were within normal limits.      All examined muscles (as indicated in the following table) showed no evidence of electrical instability.        Interpretation:  Normal examination.    --There is no electrodiagnostic evidence of median or ulnar mononeuropathy in either upper extremity.    --There is no electrodiagnostic evidence of left upper extremity motor radiculopathy or plexopathy.      ___________________________  Lisa Santiago MD        Nerve Conduction Studies  Motor Sites      Latency Amplitude Neg. Amp Diff Segment Distance Velocity Neg. Dur Neg Area Diff Temperature Comment   Site (ms) Norm (mV) Norm %  cm m/s Norm ms %  C    Left Median (APB) Motor   Wrist 3.0  < 4.4 9.6  > 5.0  Wrist-APB 8   5.0  34.7    Elbow 6.8 - 9.7 - 1.04 Elbow-Wrist 22.7 60  > 48 5.0 0 34.7    Right Median (APB) Motor   Wrist 3.0  < 4.4 8.3  > 5.0  Wrist-APB 8   5.2  35.5    Elbow 7.1 - 8.0 - -3.6 Elbow-Wrist 23.5 57  > 48 5.4 -3.2 35.5    Left Median/Ulnar (Lumb-Dors Int II) Motor        Median (Lumb I)   Wrist 2.7 - 1.38 -      6.2  34.1          Ulnar (Dorsal Int (manus))   Wrist 2.3 - 1.63 -      5.7  34.2    Right Median/Ulnar (Lumb-Dors Int II) Motor        Median (Lumb I)   Wrist 2.9 - 3.0 -      5.9  35.3         Ulnar (Dorsal Int (manus))   Wrist 2.5 - 5.0 -      4.9  35.3    Left Ulnar (ADM) Motor   Wrist 2.2  < 3.5 12.2  > 5.0  Wrist-ADM 8   4.6  33.7    Bel Elbow 5.3 - 11.4 - -6.6 Bel Elbow-Wrist 19.5 63  > 48 4.8 -1.26 33.6    Abv Elbow 7.1 - 11.4 - 0 Abv Elbow-Bel Elbow 12.5 69  > 48 4.7 -1.60 33.5    Right Ulnar (ADM) Motor   Wrist 2.4  < 3.5 13.5  > 5.0  Wrist-ADM 8   4.7  33.7    Bel Elbow 5.5 - 13.1 - -3.0 Bel Elbow-Wrist 19.8 64  > 48 4.8 -0.93 33.6    Abv Elbow 7.0 - 12.9 - -1.53 Abv Elbow-Bel Elbow 9.8 65  > 48 4.8 -1.56 33.5      Sensory Sites      Onset Lat Peak Lat Amp (O-P) Amp (P-P) Segment Distance Velocity Temperature Comment   Site ms ms  V Norm  V  cm m/s Norm  C    Left Median Sensory   Wrist-Dig II 2.1 2.6 75  > 10 92 Wrist-Dig II 14 67  > 48 31    Right Median Sensory   Wrist-Dig II 2.2 2.8 51  > 10 71 Wrist-Dig II 14 64  > 48 33.9    Left Median-Ulnar Palmar Sensory        Median   Palm-Wrist 1.33 1.75 51 - 62 Palm-Wrist 8 60 - 32.6         Ulnar   Palm-Wrist 1.00 1.48 41 - 52 Palm-Wrist 8 80 - 32.9    Right Median-Ulnar Palmar Sensory        Median   Palm-Wrist 1.50 1.90 46 - 54 Palm-Wrist 8 53 - 34.8         Ulnar   Palm-Wrist 1.18 1.68 21 - 26 Palm-Wrist 8 68 - 35    Left Ulnar Sensory   Wrist-Dig V 2.2 2.7 35  > 8 36 Wrist-Dig V 12.5 57  > 48 30.8    Right Ulnar Sensory   Wrist-Dig V 2.1 2.7 24  > 8 33 Wrist-Dig V 12.5 60  > 48 34.2      Inter-Nerve Comparisons     Nerve 1 Value 1 Nerve 2 Value 2 Parameter Result Normal   Sensory Sites   R Median Palm-Wrist 1.9 ms R Ulnar Palm-Wrist 1.7 ms Peak Lat Diff 0.22 ms <0.40   L Median Palm-Wrist 1.8 ms L Ulnar Palm-Wrist 1.5 ms Peak Lat Diff 0.27 ms <0.40       Electromyography     Side Muscle Ins Act Fibs/PSW Fasc HF Amp Dur Poly Recrt Int Pat   Left Deltoid Nml None Nml 0 Nml  Nml 0 Nml Nml   Left Biceps Nml None Nml 0 Nml Nml 0 Nml Nml   Left Triceps Nml None Nml 0 Nml Nml 0 Nml Nml   Left Pronator Teres Nml None Nml 0 Nml Nml 0 Nml Nml   Left FDI Nml None Nml 0 Nml Nml 0 Nml Nml   Left EIP Nml None Nml 0 Nml Nml 0 Nml Nml         NCS Waveforms:    Motor                    Sensory

## 2022-10-31 ENCOUNTER — VIRTUAL VISIT (OUTPATIENT)
Dept: FAMILY MEDICINE | Facility: CLINIC | Age: 32
End: 2022-10-31
Payer: COMMERCIAL

## 2022-10-31 ENCOUNTER — MYC MEDICAL ADVICE (OUTPATIENT)
Dept: FAMILY MEDICINE | Facility: CLINIC | Age: 32
End: 2022-10-31

## 2022-10-31 DIAGNOSIS — U07.1 INFECTION DUE TO 2019 NOVEL CORONAVIRUS: Primary | ICD-10-CM

## 2022-10-31 PROCEDURE — 99213 OFFICE O/P EST LOW 20 MIN: CPT | Mod: CS | Performed by: PHYSICIAN ASSISTANT

## 2022-10-31 NOTE — PATIENT INSTRUCTIONS
At [facility], we strive to deliver an exceptional experience to you, every time we see you. If you receive a survey, please complete it as we do value your feedback.  If you have MyChart, you can expect to receive results automatically within 24 hours of their completion.  Your provider will send a note interpreting your results as well.   If you do not have MyChart, you should receive your results in about a week by mail.    Your care team:                            Family Medicine Internal Medicine   MD Smith Love MD Shantel Branch-Fleming, MD Srinivasa Vaka, MD Katya Belousova, PANAIDA Hutchinson CNP, MD ([facility]) [facility]   Rodney Oropeza, MD Armida Gilliland MD Amelia Massimini APRN CARLITOS Allen APRN MD Eliezer Lewis MD          Clinic hours: Monday - Thursday 7 am-6 pm; Fridays 7 am-5 pm.   Urgent care: Monday - Friday 10 am- 8 pm; Saturday and Sunday 9 am-5 pm.    Clinic: [phone]       [facility]: Monday - Thursday 8 am - 7 pm; Friday 8 am - 6 pm  [facility]: [phone]

## 2022-10-31 NOTE — PROGRESS NOTES
"Angelita is a 32 year old who is being evaluated via a billable video visit.      How would you like to obtain your AVS? MyChart  If the video visit is dropped, the invitation should be resent by: Text to cell phone: [phone]  Will anyone else be joining your video visit? No        Assessment & Plan   Problem List Items Addressed This Visit    None  Visit Diagnoses     Infection due to 2019 novel coronavirus    -  Primary    Relevant Medications    nirmatrelvir and ritonavir (PAXLOVID) therapy pack       mild symptoms  Paxlovid full dose for 5 days  Mucinex DM max strength as needed   Tylenol or Ibuprofen as needed   increase fluids  Follow up if symptom worsen     10 minutes spent on the date of the encounter doing chart review, history and exam, documentation and further activities per the note       BMI:   Estimated body mass index is 28.09 kg/m  as calculated from the following:    Height as of 10/4/22: 1.765 m (5' 9.5\").    Weight as of 10/4/22: 87.5 kg (193 lb).           Return in about 5 days (around 11/5/2022), or if symptoms worsen or fail to improve.    Lary Brice PA-C  [facility] MARIA E Goldstein is a 32 year old, presenting for the following health issues:  Covid Concern      HPI       COVID-19 Symptom Review  How many days ago did these symptoms start? 1 day ago, home test     Are any of the following symptoms significant for you?    New or worsening difficulty breathing? No    Worsening cough? Yes, I am coughing up mucus.    Fever or chills? No    Headache: YES    Sore throat: YES    Chest pain: No    Diarrhea: No    Body aches? YES    What treatments has patient tried? Acetaminophen and Dayquil    Does patient live in a nursing home, group home, or shelter? No  Does patient have a way to get food/medications during quarantined? Yes, I have a friend or family member who can help me.            Review of Systems   Constitutional, HEENT, cardiovascular, " pulmonary, gi and gu systems are negative, except as otherwise noted.      Objective           Vitals:  No vitals were obtained today due to virtual visit.    Physical Exam   GENERAL: Healthy, alert and no distress  EYES: Eyes grossly normal to inspection.  No discharge or erythema, or obvious scleral/conjunctival abnormalities.  RESP: No audible wheeze, cough, or visible cyanosis.  No visible retractions or increased work of breathing.    SKIN: Visible skin clear. No significant rash, abnormal pigmentation or lesions.  NEURO: Cranial nerves grossly intact.  Mentation and speech appropriate for age.  PSYCH: Mentation appears normal, affect normal/bright, judgement and insight intact, normal speech and appearance well-groomed.                Video-Visit Details    Video Start Time: 10:55 AM    Type of service:  Video Visit    Video End Time:11:03 AM    Originating Location (pt. Location): Home    Distant Location (provider location):  On-site    Platform used for Video Visit: [employer]

## 2022-11-10 ENCOUNTER — THERAPY VISIT (OUTPATIENT)
Dept: PHYSICAL THERAPY | Facility: CLINIC | Age: 32
End: 2022-11-10
Payer: COMMERCIAL

## 2022-11-10 DIAGNOSIS — M79.18 MYOFASCIAL PAIN: Primary | ICD-10-CM

## 2022-11-10 DIAGNOSIS — M54.12 CERVICAL RADICULOPATHY: ICD-10-CM

## 2022-11-10 PROCEDURE — 97140 MANUAL THERAPY 1/> REGIONS: CPT | Mod: GP | Performed by: PHYSICAL THERAPIST

## 2022-11-16 ENCOUNTER — MYC MEDICAL ADVICE (OUTPATIENT)
Dept: NEUROLOGY | Facility: CLINIC | Age: 32
End: 2022-11-16

## 2022-11-21 ENCOUNTER — MYC MEDICAL ADVICE (OUTPATIENT)
Dept: NEUROSURGERY | Facility: CLINIC | Age: 32
End: 2022-11-21

## 2022-11-21 ENCOUNTER — TELEPHONE (OUTPATIENT)
Dept: NEUROLOGY | Facility: CLINIC | Age: 32
End: 2022-11-21

## 2022-11-21 ENCOUNTER — THERAPY VISIT (OUTPATIENT)
Dept: PHYSICAL THERAPY | Facility: CLINIC | Age: 32
End: 2022-11-21
Payer: COMMERCIAL

## 2022-11-21 DIAGNOSIS — M79.18 MYOFASCIAL PAIN: ICD-10-CM

## 2022-11-21 DIAGNOSIS — M54.12 CERVICAL RADICULOPATHY: Primary | ICD-10-CM

## 2022-11-21 PROCEDURE — 97110 THERAPEUTIC EXERCISES: CPT | Mod: GP | Performed by: PHYSICAL THERAPIST

## 2022-11-21 PROCEDURE — 97140 MANUAL THERAPY 1/> REGIONS: CPT | Mod: GP | Performed by: PHYSICAL THERAPIST

## 2022-11-21 NOTE — TELEPHONE ENCOUNTER
Workability form filled out and signed by Dr Mesa. Faxed back to  Absence management.  [phone]

## 2022-11-24 DIAGNOSIS — M54.12 CERVICAL RADICULOPATHY: Primary | ICD-10-CM

## 2022-11-24 DIAGNOSIS — Q75.8 BASILAR INVAGINATION: ICD-10-CM

## 2022-11-28 ENCOUNTER — THERAPY VISIT (OUTPATIENT)
Dept: PHYSICAL THERAPY | Facility: CLINIC | Age: 32
End: 2022-11-28
Payer: COMMERCIAL

## 2022-11-28 DIAGNOSIS — M79.18 MYOFASCIAL PAIN: ICD-10-CM

## 2022-11-28 DIAGNOSIS — M54.12 CERVICAL RADICULOPATHY: Primary | ICD-10-CM

## 2022-11-28 PROCEDURE — 97140 MANUAL THERAPY 1/> REGIONS: CPT | Mod: GP | Performed by: PHYSICAL THERAPIST

## 2022-11-28 PROCEDURE — 97110 THERAPEUTIC EXERCISES: CPT | Mod: GP | Performed by: PHYSICAL THERAPIST

## 2022-11-29 ENCOUNTER — TELEPHONE (OUTPATIENT)
Dept: NEUROSURGERY | Facility: CLINIC | Age: 32
End: 2022-11-29

## 2022-11-29 NOTE — TELEPHONE ENCOUNTER
Writer UBALDO for pt to schedule MRI for next available and provided imaging scheduling line    Erin Morel

## 2022-12-02 ENCOUNTER — OFFICE VISIT (OUTPATIENT)
Dept: FAMILY MEDICINE | Facility: CLINIC | Age: 32
End: 2022-12-02
Payer: COMMERCIAL

## 2022-12-02 VITALS
SYSTOLIC BLOOD PRESSURE: 122 MMHG | WEIGHT: 205 LBS | HEART RATE: 83 BPM | OXYGEN SATURATION: 100 % | TEMPERATURE: 97.6 F | RESPIRATION RATE: 16 BRPM | BODY MASS INDEX: 30.36 KG/M2 | DIASTOLIC BLOOD PRESSURE: 76 MMHG | HEIGHT: 69 IN

## 2022-12-02 DIAGNOSIS — Q75.8 BASILAR INVAGINATION: Primary | ICD-10-CM

## 2022-12-02 DIAGNOSIS — F41.9 ANXIETY: ICD-10-CM

## 2022-12-02 PROCEDURE — 99213 OFFICE O/P EST LOW 20 MIN: CPT | Performed by: FAMILY MEDICINE

## 2022-12-02 RX ORDER — CLONAZEPAM 0.5 MG/1
0.25 TABLET ORAL 2 TIMES DAILY PRN
Qty: 20 TABLET | Refills: 0 | Status: SHIPPED | OUTPATIENT
Start: 2022-12-02 | End: 2023-02-27

## 2022-12-02 ASSESSMENT — ANXIETY QUESTIONNAIRES
3. WORRYING TOO MUCH ABOUT DIFFERENT THINGS: NOT AT ALL
GAD7 TOTAL SCORE: 1
6. BECOMING EASILY ANNOYED OR IRRITABLE: SEVERAL DAYS
IF YOU CHECKED OFF ANY PROBLEMS ON THIS QUESTIONNAIRE, HOW DIFFICULT HAVE THESE PROBLEMS MADE IT FOR YOU TO DO YOUR WORK, TAKE CARE OF THINGS AT HOME, OR GET ALONG WITH OTHER PEOPLE: NOT DIFFICULT AT ALL
5. BEING SO RESTLESS THAT IT IS HARD TO SIT STILL: NOT AT ALL
GAD7 TOTAL SCORE: 1
7. FEELING AFRAID AS IF SOMETHING AWFUL MIGHT HAPPEN: NOT AT ALL
2. NOT BEING ABLE TO STOP OR CONTROL WORRYING: NOT AT ALL
1. FEELING NERVOUS, ANXIOUS, OR ON EDGE: NOT AT ALL

## 2022-12-02 ASSESSMENT — PATIENT HEALTH QUESTIONNAIRE - PHQ9
SUM OF ALL RESPONSES TO PHQ QUESTIONS 1-9: 0
5. POOR APPETITE OR OVEREATING: NOT AT ALL

## 2022-12-02 ASSESSMENT — PAIN SCALES - GENERAL: PAINLEVEL: NO PAIN (0)

## 2022-12-02 ASSESSMENT — ENCOUNTER SYMPTOMS: NERVOUS/ANXIOUS: 1

## 2022-12-02 NOTE — PROGRESS NOTES
"  Assessment & Plan     Anxiety  Stable on Celexa.Uses clonazepam PRN. Last refill of #20 was 8 month ago. Needs refills.  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 Working with physical therapy. Has been                BMI:   Estimated body mass index is 30.02 kg/m  as calculated from the following:    Height as of this encounter: 1.76 m (5' 9.29\").    Weight as of this encounter: 93 kg (205 lb).           No follow-ups on file.    Gris Gomez MD  [facility]    Jayme Goldstein is a 32 year old, presenting for the following health issues:  Anxiety      Anxiety    History of Present Illness       Reason for visit:  Checking in after recent ER visit and neuro workup    She eats 4 or more servings of fruits and vegetables daily.She consumes 0 sweetened beverage(s) daily.She exercises with enough effort to increase her heart rate 30 to 60 minutes per day.  She exercises with enough effort to increase her heart rate 5 days per week.   She is taking medications regularly.       Anxiety Follow-Up    How are you doing with your anxiety since your last visit? Improved     Are you having other symptoms that might be associated with anxiety? Yes:  rare panic attacks    Have you had a significant life event? Health Concerns     Are you feeling depressed? No    Do you have any concerns with your use of alcohol or other drugs? No    Social History     Tobacco Use     Smoking status: Never     Smokeless tobacco: Never   Substance Use Topics     Alcohol use: Yes     Comment: socially     Drug use: No     NEGRITO-7 SCORE 8/19/2021 4/6/2022 12/2/2022   Total Score 5 (mild anxiety) 2 (minimal anxiety) -   Total Score 5 2 1     PHQ 8/19/2021 4/6/2022 12/2/2022   PHQ-9 Total Score 2 1 0   Q9: Thoughts of better off dead/self-harm past 2 weeks Not at all Not at all " "Not at all     Last PHQ-9 12/2/2022   1.  Little interest or pleasure in doing things 0   2.  Feeling down, depressed, or hopeless 0   3.  Trouble falling or staying asleep, or sleeping too much 0   4.  Feeling tired or having little energy 0   5.  Poor appetite or overeating 0   6.  Feeling bad about yourself 0   7.  Trouble concentrating 0   8.  Moving slowly or restless 0   Q9: Thoughts of better off dead/self-harm past 2 weeks 0   PHQ-9 Total Score 0   Difficulty at work, home, or with people Not difficult at all     NEGRITO-7  12/2/2022   1. Feeling nervous, anxious, or on edge 0   2. Not being able to stop or control worrying 0   3. Worrying too much about different things 0   4. Trouble relaxing 0   5. Being so restless that it is hard to sit still 0   6. Becoming easily annoyed or irritable 1   7. Feeling afraid, as if something awful might happen 0   NEGRITO-7 Total Score 1   If you checked any problems, how difficult have they made it for you to do your work, take care of things at home, or get along with other people? Not difficult at all           Review of Systems   Psychiatric/Behavioral: The patient is nervous/anxious.             Objective    /76   Pulse 83   Temp 97.6  F (36.4  C) (Tympanic)   Resp 16   Ht 1.76 m (5' 9.29\")   Wt 93 kg (205 lb)   LMP 11/17/2022 (Approximate)   SpO2 100%   BMI 30.02 kg/m    Body mass index is 30.02 kg/m .  Physical Exam   GENERAL: Pleasant, well appearing female.  PSYCH: Alert and oriented x3, neatly dressed and well groomed, makes good eye contact, fluid speech - non-pressured, no psychomotor agitation or slowing, good memory, judgement and insight, no auditory or visual hallucinations, bright affect which is mood congruent. No suicidal ideation.                     "

## 2022-12-05 ENCOUNTER — THERAPY VISIT (OUTPATIENT)
Dept: PHYSICAL THERAPY | Facility: CLINIC | Age: 32
End: 2022-12-05
Payer: COMMERCIAL

## 2022-12-05 DIAGNOSIS — M79.18 MYOFASCIAL PAIN: ICD-10-CM

## 2022-12-05 DIAGNOSIS — M54.12 CERVICAL RADICULOPATHY: Primary | ICD-10-CM

## 2022-12-05 PROCEDURE — 97140 MANUAL THERAPY 1/> REGIONS: CPT | Mod: GP | Performed by: PHYSICAL THERAPIST

## 2022-12-05 PROCEDURE — 97110 THERAPEUTIC EXERCISES: CPT | Mod: GP | Performed by: PHYSICAL THERAPIST

## 2022-12-09 NOTE — TELEPHONE ENCOUNTER
RECORDS RECEIVED FROM: Internal   REASON FOR VISIT: Facial numbness   Date of Appt: 03/01/2023   NOTES (FOR ALL VISITS) STATUS DETAILS   OFFICE NOTE from referring provider N/A    OFFICE NOTE from other specialist N/A    DISCHARGE SUMMARY from hospital N/A    DISCHARGE REPORT from the ER Internal 09/22/2022 [facility]   OPERATIVE REPORT N/A    MEDICATION LIST N/A    IMAGING  (FOR ALL VISITS)     EMG N/A    EEG N/A    LUMBAR PUNCTURE N/A    ANNA SCAN N/A    ULTRASOUND (CAROTID BILAT) *VASCULAR* N/A    MRI (HEAD, NECK, SPINE) Internal 09/22/2022 brain    CT (HEAD, NECK, SPINE) N/A

## 2022-12-12 ENCOUNTER — THERAPY VISIT (OUTPATIENT)
Dept: PHYSICAL THERAPY | Facility: CLINIC | Age: 32
End: 2022-12-12
Payer: COMMERCIAL

## 2022-12-12 DIAGNOSIS — M54.12 CERVICAL RADICULOPATHY: Primary | ICD-10-CM

## 2022-12-12 DIAGNOSIS — M79.18 MYOFASCIAL PAIN: ICD-10-CM

## 2022-12-12 PROBLEM — Q75.8 BASILAR INVAGINATION: Status: ACTIVE | Noted: 2022-12-12

## 2022-12-12 PROCEDURE — 97140 MANUAL THERAPY 1/> REGIONS: CPT | Mod: GP | Performed by: PHYSICAL THERAPIST

## 2022-12-12 PROCEDURE — 97112 NEUROMUSCULAR REEDUCATION: CPT | Mod: GP | Performed by: PHYSICAL THERAPIST

## 2022-12-26 ENCOUNTER — THERAPY VISIT (OUTPATIENT)
Dept: PHYSICAL THERAPY | Facility: CLINIC | Age: 32
End: 2022-12-26
Payer: COMMERCIAL

## 2022-12-26 DIAGNOSIS — M54.12 CERVICAL RADICULOPATHY: Primary | ICD-10-CM

## 2022-12-26 DIAGNOSIS — M79.18 MYOFASCIAL PAIN: ICD-10-CM

## 2022-12-26 PROCEDURE — 97140 MANUAL THERAPY 1/> REGIONS: CPT | Mod: GP | Performed by: PHYSICAL THERAPIST

## 2022-12-26 PROCEDURE — 97110 THERAPEUTIC EXERCISES: CPT | Mod: GP | Performed by: PHYSICAL THERAPIST

## 2023-01-02 NOTE — PROGRESS NOTES
Patient called and cancelled appointment on January 2, 2023 as she is doing well. Refer to SOAP on 12- as discharge summary.

## 2023-02-26 ASSESSMENT — ENCOUNTER SYMPTOMS
MYALGIAS: 0
BREAST MASS: 0
HEADACHES: 0
DIZZINESS: 0
EYE PAIN: 0
PARESTHESIAS: 0
ARTHRALGIAS: 0
HEMATURIA: 0
COUGH: 0
DIARRHEA: 0
CHILLS: 0
ABDOMINAL PAIN: 0
HEARTBURN: 0
WEAKNESS: 0
HEMATOCHEZIA: 0
CONSTIPATION: 0
PALPITATIONS: 0
SORE THROAT: 1
DYSURIA: 0
SHORTNESS OF BREATH: 0
FEVER: 0
NERVOUS/ANXIOUS: 0
NAUSEA: 0
JOINT SWELLING: 0
FREQUENCY: 0

## 2023-02-27 ENCOUNTER — OFFICE VISIT (OUTPATIENT)
Dept: FAMILY MEDICINE | Facility: CLINIC | Age: 33
End: 2023-02-27
Payer: COMMERCIAL

## 2023-02-27 VITALS
HEART RATE: 66 BPM | RESPIRATION RATE: 22 BRPM | WEIGHT: 207 LBS | DIASTOLIC BLOOD PRESSURE: 80 MMHG | SYSTOLIC BLOOD PRESSURE: 138 MMHG | HEIGHT: 69 IN | TEMPERATURE: 97.6 F | BODY MASS INDEX: 30.66 KG/M2 | OXYGEN SATURATION: 99 %

## 2023-02-27 DIAGNOSIS — Z12.4 CERVICAL CANCER SCREENING: ICD-10-CM

## 2023-02-27 DIAGNOSIS — L68.0 FEMALE HIRSUTISM: ICD-10-CM

## 2023-02-27 DIAGNOSIS — J35.8 TONSIL STONE: ICD-10-CM

## 2023-02-27 DIAGNOSIS — F41.9 ANXIETY: ICD-10-CM

## 2023-02-27 DIAGNOSIS — Z00.00 PREVENTATIVE HEALTH CARE: Primary | ICD-10-CM

## 2023-02-27 LAB
CHOLEST SERPL-MCNC: 153 MG/DL
FASTING STATUS PATIENT QL REPORTED: YES
FASTING STATUS PATIENT QL REPORTED: YES
GLUCOSE BLD-MCNC: 106 MG/DL (ref 70–99)
HDLC SERPL-MCNC: 67 MG/DL
LDLC SERPL CALC-MCNC: 74 MG/DL
NONHDLC SERPL-MCNC: 86 MG/DL
TRIGL SERPL-MCNC: 58 MG/DL

## 2023-02-27 PROCEDURE — 82947 ASSAY GLUCOSE BLOOD QUANT: CPT | Performed by: NURSE PRACTITIONER

## 2023-02-27 PROCEDURE — 80061 LIPID PANEL: CPT | Performed by: NURSE PRACTITIONER

## 2023-02-27 PROCEDURE — 99395 PREV VISIT EST AGE 18-39: CPT | Performed by: NURSE PRACTITIONER

## 2023-02-27 PROCEDURE — 87624 HPV HI-RISK TYP POOLED RSLT: CPT | Performed by: NURSE PRACTITIONER

## 2023-02-27 PROCEDURE — G0145 SCR C/V CYTO,THINLAYER,RESCR: HCPCS | Performed by: NURSE PRACTITIONER

## 2023-02-27 PROCEDURE — G0124 SCREEN C/V THIN LAYER BY MD: HCPCS

## 2023-02-27 PROCEDURE — 36415 COLL VENOUS BLD VENIPUNCTURE: CPT | Performed by: NURSE PRACTITIONER

## 2023-02-27 RX ORDER — SPIRONOLACTONE 100 MG/1
100 TABLET, FILM COATED ORAL DAILY
Qty: 90 TABLET | Refills: 3 | Status: SHIPPED | OUTPATIENT
Start: 2023-02-27 | End: 2023-03-20

## 2023-02-27 RX ORDER — CITALOPRAM HYDROBROMIDE 20 MG/1
20 TABLET ORAL DAILY
Qty: 90 TABLET | Refills: 3 | Status: SHIPPED | OUTPATIENT
Start: 2023-02-27 | End: 2023-03-20

## 2023-02-27 RX ORDER — CLONAZEPAM 0.5 MG/1
0.25 TABLET ORAL 2 TIMES DAILY PRN
Qty: 20 TABLET | Refills: 0 | Status: SHIPPED | OUTPATIENT
Start: 2023-02-27 | End: 2023-03-20

## 2023-02-27 ASSESSMENT — ENCOUNTER SYMPTOMS
NAUSEA: 0
EYE PAIN: 0
DYSURIA: 0
DIZZINESS: 0
ABDOMINAL PAIN: 0
ARTHRALGIAS: 0
JOINT SWELLING: 0
SHORTNESS OF BREATH: 0
WEAKNESS: 0
CHILLS: 0
MYALGIAS: 0
HEMATOCHEZIA: 0
FREQUENCY: 0
PALPITATIONS: 0
BREAST MASS: 0
SORE THROAT: 1
HEADACHES: 0
HEMATURIA: 0
DIARRHEA: 0
PARESTHESIAS: 0
COUGH: 0
HEARTBURN: 0
FEVER: 0
CONSTIPATION: 0
NERVOUS/ANXIOUS: 0

## 2023-02-27 ASSESSMENT — PAIN SCALES - GENERAL: PAINLEVEL: NO PAIN (0)

## 2023-02-27 NOTE — PROGRESS NOTES
SUBJECTIVE:   CC: Angelita is an 32 year old who presents for preventive health visit.   Patient has been advised of split billing requirements and indicates understanding: Yes  Healthy Habits:     Getting at least 3 servings of Calcium per day:  Yes    Bi-annual eye exam:  Yes    Dental care twice a year:  Yes    Sleep apnea or symptoms of sleep apnea:  None    Diet:  Regular (no restrictions)    Frequency of exercise:  4-5 days/week    Duration of exercise:  45-60 minutes    Taking medications regularly:  Yes    Medication side effects:  Not applicable    PHQ-2 Total Score: 0    Additional concerns today:  Yes    Patient is getting  in May at a cabin in [place].  They are thinking about getting pregnant sometime after.  We talked about when to remove the Nexplanon, and she will talk it over with her significant other.  The Klonopin, spironolactone, and gabapentin are not safe for pregnancy.  We may need to consider increasing the citalopram depending on how her anxiety is.  The Maxalt could be used sparingly    We will send refills to her pharmacy, she is looking at transferring care to this clinic.    She is working on improving exercise and nutrition        Today's PHQ-2 Score:   PHQ-2 ( 1999 [employer]) 2/26/2023   Q1: Little interest or pleasure in doing things 0   Q2: Feeling down, depressed or hopeless 0   PHQ-2 Score 0   PHQ-2 Total Score (12-17 Years)- Positive if 3 or more points; Administer PHQ-A if positive -   Q1: Little interest or pleasure in doing things Not at all   Q2: Feeling down, depressed or hopeless Not at all   PHQ-2 Score 0           Social History     Tobacco Use     Smoking status: Never     Smokeless tobacco: Never   Substance Use Topics     Alcohol use: Yes     Comment: Socially on weekends     If you drink alcohol do you typically have >3 drinks per day or >7 drinks per week? No    No flowsheet data found.    Reviewed orders with patient.  Reviewed health maintenance and updated  orders accordingly - Yes  Lab work is in process  Labs reviewed in EPIC    Breast Cancer Screening:    FHS-7:   Breast CA Risk Assessment (FHS-7) 4/5/2022 2/26/2023   Did any of your first-degree relatives have breast or ovarian cancer? No No   Did any of your relatives have bilateral breast cancer? No No   Did any man in your family have breast cancer? No No   Did any woman in your family have breast and ovarian cancer? No No   Did any woman in your family have breast cancer before age 50 y? No No   Do you have 2 or more relatives with breast and/or ovarian cancer? No No   Do you have 2 or more relatives with breast and/or bowel cancer? Yes Yes     click delete button to remove this line now  Patient under 40 years of age: Routine Mammogram Screening not recommended.   Pertinent mammograms are reviewed under the imaging tab.    History of abnormal Pap smear: NO - age 30-65 PAP every 5 years with negative HPV co-testing recommended  PAP / HPV Latest Ref Rng & Units 4/6/2022 4/25/2019 3/8/2016   PAP   Atypical squamous cells of undetermined significance (ASC-US)(A) - -   PAP (Historical) - - ASC-US(A) NIL   HPV16 Negative Negative Negative Negative   HPV18 Negative Negative Negative Negative   HRHPV Negative Negative Negative Negative     Reviewed and updated as needed this visit by clinical staff   Tobacco  Allergies  Meds     [facility] Hx          Reviewed and updated as needed this visit by Provider         [facility] Hx             Review of Systems   Constitutional: Negative for chills and fever.   HENT: Positive for sore throat. Negative for congestion, ear pain and hearing loss.    Eyes: Negative for pain and visual disturbance.   Respiratory: Negative for cough and shortness of breath.    Cardiovascular: Negative for chest pain, palpitations and peripheral edema.   Gastrointestinal: Negative for abdominal pain, constipation, diarrhea, heartburn, hematochezia and nausea.   Breasts:  Negative for tenderness, breast  "mass and discharge.   Genitourinary: Negative for dysuria, frequency, genital sores, hematuria, pelvic pain, urgency, vaginal bleeding and vaginal discharge.   Musculoskeletal: Negative for arthralgias, joint swelling and myalgias.   Skin: Negative for rash.   Neurological: Negative for dizziness, weakness, headaches and paresthesias.   Psychiatric/Behavioral: Negative for mood changes. The patient is not nervous/anxious.        Sore throat: tonsil stones: bloody noses, congestion sinus. Never fever or feeling sick      OBJECTIVE:   /80   Pulse 66   Temp 97.6  F (36.4  C) (Tympanic)   Resp 22   Ht 1.753 m (5' 9\")   Wt 93.9 kg (207 lb)   SpO2 99%   BMI 30.57 kg/m    Physical Exam  GENERAL: healthy, alert and no distress  EYES: Eyes grossly normal to inspection, PERRL and conjunctivae and sclerae normal  HENT: ear canals and TM's normal, nose and mouth without ulcers or lesions  NECK: no adenopathy, no asymmetry, masses, or scars and thyroid normal to palpation  RESP: lungs clear to auscultation - no rales, rhonchi or wheezes  BREAST: normal without masses, tenderness or nipple discharge and no palpable axillary masses or adenopathy  CV: regular rate and rhythm, normal S1 S2, no S3 or S4, no murmur, click or rub, no peripheral edema and peripheral pulses strong  ABDOMEN: soft, nontender, no hepatosplenomegaly, no masses and bowel sounds normal   (female): normal female external genitalia, normal urethral meatus, vaginal mucosa pink, moist, well rugated, and normal cervix/adnexa/uterus without masses or discharge  MS: no gross musculoskeletal defects noted, no edema  SKIN: no suspicious lesions or rashes  NEURO: Normal strength and tone, mentation intact and speech normal  PSYCH: mentation appears normal, affect normal/bright    Diagnostic Test Results:  Labs reviewed in Epic  No results found for this or any previous visit (from the past 24 hour(s)).    ASSESSMENT/PLAN:   Angelita was seen today for " "physical.    Diagnoses and all orders for this visit:    Preventative health care  -     Lipid panel reflex to direct LDL Fasting; Future  -     Glucose; Future  -     Lipid panel reflex to direct LDL Fasting  -     Glucose    Cervical cancer screening  -     Pap Screen with HPV - recommended age 30 - 65 years    Anxiety  -     citalopram (CELEXA) 20 MG tablet; Take 1 tablet (20 mg) by mouth daily  -     clonazePAM (KLONOPIN) 0.5 MG tablet; Take 0.5 tablets (0.25 mg) by mouth 2 times daily as needed for anxiety    Female hirsutism  -     spironolactone (ALDACTONE) 100 MG tablet; Take 1 tablet (100 mg) by mouth daily    Tonsil stone  -     Adult ENT  Referral; Future      Patient does know if she needs a referral for ENT.  She will see them in [place].  Having tonsil stones more often.    We went over pregnancy planning, and which medications that she will have to stop if she does become pregnant    Patient has been advised of split billing requirements and indicates understanding: No      COUNSELING:  Reviewed preventive health counseling, as reflected in patient instructions      BMI:   Estimated body mass index is 30.57 kg/m  as calculated from the following:    Height as of this encounter: 1.753 m (5' 9\").    Weight as of this encounter: 93.9 kg (207 lb).   Weight management plan: Discussed healthy diet and exercise guidelines      She reports that she has never smoked. She has never used smokeless tobacco.      NAIDA Ha, NP-C  [facility]    "

## 2023-02-28 NOTE — RESULT ENCOUNTER NOTE
Angelita,  -Your cholesterol panel looks excellent  - The fasting blood glucose is elevated in the pre-diabetic range (100-125).  Lifestyle measures will help to lower your blood glucose levels and lower the risks of diabetes from developing in the future. These measures include regular exercise, weight loss/maintaining a healthy body weight, and moderating/decreasing carbohydrates (sugar, bread, pasta, rice, baked goods, potatoes, etc) in your diet. We will continue to monitor your blood glucose annually, but please be seen sooner  if you develop any new signs or symptoms of diabetes (increase thirst or urination, fatigue, blurry vision, unexplained weight loss).    Please MyChart or call if you have any concerns or questions.   Sincerely,  Jaimie Garza MD  (for Julianna Esposito NP who is out of the office today)

## 2023-03-01 ENCOUNTER — PRE VISIT (OUTPATIENT)
Dept: NEUROLOGY | Facility: CLINIC | Age: 33
End: 2023-03-01

## 2023-03-06 ENCOUNTER — PATIENT OUTREACH (OUTPATIENT)
Dept: FAMILY MEDICINE | Facility: CLINIC | Age: 33
End: 2023-03-06
Payer: COMMERCIAL

## 2023-03-06 DIAGNOSIS — R87.610 ASCUS OF CERVIX WITH NEGATIVE HIGH RISK HPV: ICD-10-CM

## 2023-03-20 ENCOUNTER — OFFICE VISIT (OUTPATIENT)
Dept: FAMILY MEDICINE | Facility: CLINIC | Age: 33
End: 2023-03-20
Payer: COMMERCIAL

## 2023-03-20 VITALS
HEIGHT: 70 IN | BODY MASS INDEX: 29.49 KG/M2 | OXYGEN SATURATION: 99 % | RESPIRATION RATE: 20 BRPM | SYSTOLIC BLOOD PRESSURE: 130 MMHG | HEART RATE: 74 BPM | TEMPERATURE: 98.4 F | WEIGHT: 206 LBS | DIASTOLIC BLOOD PRESSURE: 87 MMHG

## 2023-03-20 DIAGNOSIS — G43.709 CHRONIC MIGRAINE WITHOUT AURA WITHOUT STATUS MIGRAINOSUS, NOT INTRACTABLE: ICD-10-CM

## 2023-03-20 DIAGNOSIS — F41.9 ANXIETY: ICD-10-CM

## 2023-03-20 DIAGNOSIS — J35.8 TONSIL STONE: ICD-10-CM

## 2023-03-20 DIAGNOSIS — L68.0 FEMALE HIRSUTISM: ICD-10-CM

## 2023-03-20 DIAGNOSIS — Z01.818 PRE-OP EXAM: Primary | ICD-10-CM

## 2023-03-20 PROCEDURE — 99214 OFFICE O/P EST MOD 30 MIN: CPT | Performed by: NURSE PRACTITIONER

## 2023-03-20 RX ORDER — CITALOPRAM HYDROBROMIDE 20 MG/1
20 TABLET ORAL DAILY
Qty: 90 TABLET | Refills: 3 | Status: SHIPPED | OUTPATIENT
Start: 2023-03-20 | End: 2024-03-04

## 2023-03-20 RX ORDER — SPIRONOLACTONE 100 MG/1
100 TABLET, FILM COATED ORAL DAILY
Qty: 90 TABLET | Refills: 3 | Status: SHIPPED | OUTPATIENT
Start: 2023-03-20 | End: 2023-09-21

## 2023-03-20 RX ORDER — CLONAZEPAM 0.5 MG/1
0.25 TABLET ORAL 2 TIMES DAILY PRN
Qty: 20 TABLET | Refills: 1 | Status: SHIPPED | OUTPATIENT
Start: 2023-03-20 | End: 2023-11-06

## 2023-03-20 ASSESSMENT — PAIN SCALES - GENERAL: PAINLEVEL: NO PAIN (0)

## 2023-03-20 NOTE — PROGRESS NOTES
[address] [address]  Phone: [phone]  Primary Provider: Gris Gomez  Pre-op Performing Provider: LETTY LAMAR      PREOPERATIVE EVALUATION:  Today's date: 3/20/2023    Angelita Hobbs is a 32 year old female who presents for a preoperative evaluation.    Surgical Information:  Surgery/Procedure: Tonsillectomy  Surgery Location: ENT specialty care [facility]  Surgeon: Dr. Stover  Surgery Date: 03/22/2023  Time of Surgery: TBD  Where patient plans to recover: At home with family  Fax number for surgical facility:  Phone: [phone]      Type of Anesthesia Anticipated: to be determined    Assessment & Plan     The proposed surgical procedure is considered LOW risk.    Pre-op exam  Normal exam, no concerns    Tonsil stone  Frequent tonsil stones reason for surgery    Anxiety  stable  - citalopram (CELEXA) 20 MG tablet; Take 1 tablet (20 mg) by mouth daily  - clonazePAM (KLONOPIN) 0.5 MG tablet; Take 0.5 tablets (0.25 mg) by mouth 2 times daily as needed for anxiety    Chronic migraine without aura without status migrainosus, not intractable  stable    Female hirsutism  stable  - spironolactone (ALDACTONE) 100 MG tablet; Take 1 tablet (100 mg) by mouth daily           Risks and Recommendations:  The patient has the following additional risks and recommendations for perioperative complications:   - No identified additional risk factors other than previously addressed    Medication Instructions:  Patient is to take all scheduled medications on the day of surgery    RECOMMENDATION:  APPROVAL GIVEN to proceed with proposed procedure, without further diagnostic evaluation.      Subjective     HPI related to upcoming procedure:   Tonsil stones, constant.     Preop Questions 3/20/2023   1. Have you ever had a heart attack or stroke? No   2. Have you ever had surgery on your heart or blood vessels, such as a stent placement, a  coronary artery bypass, or surgery on an artery in your head, neck, heart, or legs? No   3. Do you have chest pain with activity? No   4. Do you have a history of  heart failure? No   5. Do you currently have a cold, bronchitis or symptoms of other infection? No   6. Do you have a cough, shortness of breath, or wheezing? No   7. Do you or anyone in your family have previous history of blood clots? No   8. Do you or does anyone in your family have a serious bleeding problem such as prolonged bleeding following surgeries or cuts? No   9. Have you ever had problems with anemia or been told to take iron pills? No   10. Have you had any abnormal blood loss such as black, tarry or bloody stools, or abnormal vaginal bleeding? No   11. Have you ever had a blood transfusion? No   12. Are you willing to have a blood transfusion if it is medically needed before, during, or after your surgery? Yes   13. Have you or any of your relatives ever had problems with anesthesia? No   14. Do you have sleep apnea, excessive snoring or daytime drowsiness? No   15. Do you have any artifical heart valves or other implanted medical devices like a pacemaker, defibrillator, or continuous glucose monitor? No   16. Do you have artificial joints? No   17. Are you allergic to latex? No   18. Is there any chance that you may be pregnant? No       Health Care Directive:  Patient does not have a Health Care Directive or Living Will: Discussed advance care planning with patient; however, patient declined at this time.    Preoperative Review of :   reviewed - no record of controlled substances prescribed.      Status of Chronic Conditions:  See problem list for active medical problems.  Problems all longstanding and stable, except as noted/documented.  See ROS for pertinent symptoms related to these conditions.      Review of Systems  CONSTITUTIONAL: NEGATIVE for fever, chills, change in weight  INTEGUMENTARY/SKIN: NEGATIVE for worrisome rashes,  moles or lesions  EYES: NEGATIVE for vision changes or irritation  ENT/MOUTH: NEGATIVE for ear, mouth and throat problems  RESP: NEGATIVE for significant cough or SOB  CV: NEGATIVE for chest pain, palpitations or peripheral edema  GI: NEGATIVE for nausea, abdominal pain, heartburn, or change in bowel habits  : NEGATIVE for frequency, dysuria, or hematuria  MUSCULOSKELETAL: NEGATIVE for significant arthralgias or myalgia  NEURO: NEGATIVE for weakness, dizziness or paresthesias  ENDOCRINE: NEGATIVE for temperature intolerance, skin/hair changes  HEME: NEGATIVE for bleeding problems  PSYCHIATRIC: NEGATIVE for changes in mood or affect    Patient Active Problem List    Diagnosis Date Noted     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  3/8/16 NIL pap, Neg HPV.  4/25/19 ASCUS pap, neg HR HPV. Plan cotest in 3 years.   4/6/22 ASCUS pap, neg HR HPV. Plan: cotest in 1 year  2/27/23 LSIL pap, neg HR HPV. Plan: Cotest in 1 yr.   3/6/23 My chart sent. Patient read same day       Nexplanon insertion 12/22/2016     Priority: Medium     Nexplanon removed and replaced by Dr Kee  Left arm  To be removed 12/22/2019  Lot [id number]  Exp 03/2019  Cintia Davis         Secondary amenorrhea 12/17/2012     Priority: Medium      Past Medical History:   Diagnosis Date     Abnormal Pap smear of cervix 04/25/2019    See problem list     Irregular menses      Menarche     age 15     Past Surgical History:   Procedure Laterality Date     EXCISE MASS BUTTOCK(S) Right 3/7/2019    Procedure: Excision of Right Buttocks Mass;  Surgeon: Carmelina Barr MD;   "Location: [facility] OR     HERNIA REPAIR, INGUINAL RT/LT  1996    Right     MOUTH SURGERY  2009    [place] teeth     SOFT TISSUE SURGERY  3/7/19    Mass excision R buttock     Current Outpatient Medications   Medication Sig Dispense Refill     citalopram (CELEXA) 20 MG tablet Take 1 tablet (20 mg) by mouth daily 90 tablet 3     clonazePAM (KLONOPIN) 0.5 MG tablet Take 0.5 tablets (0.25 mg) by mouth 2 times daily as needed for anxiety 20 tablet 1     etonogestrel (IMPLANON/NEXPLANON) 68 MG IMPL 1 each by Subdermal route once       rizatriptan (MAXALT) 10 MG tablet Take 1 tablet (10 mg) by mouth at onset of headache for migraine (repeat in 2 hours if needed) May repeat in 2 hours. Max 3 tablets/24 hours. 18 tablet 11     spironolactone (ALDACTONE) 100 MG tablet Take 1 tablet (100 mg) by mouth daily 90 tablet 3       No Known Allergies     Social History     Tobacco Use     Smoking status: Never     Smokeless tobacco: Never   Substance Use Topics     Alcohol use: Yes     Comment: Socially on weekends     Family History   Problem Relation Age of Onset     Hypertension Father      Anxiety Disorder Maternal Grandmother      Colon Cancer Maternal Grandfather      Cancer Maternal Grandfather         lung     Heart Disease Maternal Grandfather         valve replaced     Diabetes Paternal Grandmother      Arthritis Paternal Grandfather      Cancer Paternal Grandfather         skin     Breast Cancer Other      Colon Cancer Other      History   Drug Use No         Objective     /87   Pulse 74   Temp 98.4  F (36.9  C) (Tympanic)   Resp 20   Ht 1.765 m (5' 9.5\")   Wt 93.4 kg (206 lb)   SpO2 99%   BMI 29.98 kg/m      Physical Exam    GENERAL APPEARANCE: healthy, alert and no distress     EYES: EOMI, PERRL     HENT: ear canals and TM's normal and nose and mouth without ulcers or lesions     NECK: no adenopathy, no asymmetry, masses, or scars and thyroid normal to palpation     RESP: lungs clear to auscultation - no rales, " rhonchi or wheezes     CV: regular rates and rhythm, normal S1 S2, no S3 or S4 and no murmur, click or rub     ABDOMEN:  soft, nontender, no HSM or masses and bowel sounds normal     MS: extremities normal- no gross deformities noted, no evidence of inflammation in joints, FROM in all extremities.     SKIN: no suspicious lesions or rashes     NEURO: Normal strength and tone, sensory exam grossly normal, mentation intact and speech normal     PSYCH: mentation appears normal. and affect normal/bright     LYMPHATICS: No cervical adenopathy    Recent Labs   Lab Test 09/22/22  0655 04/06/22  0733   HGB 15.0  --      --     137   POTASSIUM 3.8 4.3   CR 0.80 0.81        Diagnostics:  No labs were ordered during this visit.   No EKG required for low risk surgery (cataract, skin procedure, breast biopsy, etc).    Revised Cardiac Risk Index (RCRI):  The patient has the following serious cardiovascular risks for perioperative complications:   - No serious cardiac risks = 0 points     RCRI Interpretation: 0 points: Class I (very low risk - 0.4% complication rate)           Signed Electronically by:   NAIDA Ha, NP-C  [facility]  Copy of this evaluation report is provided to requesting physician.      Answers for HPI/ROS submitted by the patient on 3/20/2023  What is the reason for your visit today? : preop physical for B tonsillectomy on 3/22  How many servings of fruits and vegetables do you eat daily?: 4 or more  On average, how many sweetened beverages do you drink each day (Examples: soda, juice, sweet tea, etc.  Do NOT count diet or artificially sweetened beverages)?: 0  How many minutes a day do you exercise enough to make your heart beat faster?: 30 to 60  How many days a week do you exercise enough to make your heart beat faster?: 5  How many days per week do you miss taking your medication?: 0

## 2023-03-20 NOTE — Clinical Note
Please fax pre-op to ENT. We only have their phone number so call to get fax number please.  Phone: [phone] Thank you, NAIDA Ha, NP-C [facility]

## 2023-03-22 ENCOUNTER — LAB REQUISITION (OUTPATIENT)
Dept: LAB | Facility: CLINIC | Age: 33
End: 2023-03-22
Payer: COMMERCIAL

## 2023-03-22 DIAGNOSIS — J35.01 CHRONIC TONSILLITIS: ICD-10-CM

## 2023-03-22 DIAGNOSIS — J35.1 HYPERTROPHY OF TONSILS: ICD-10-CM

## 2023-03-22 PROCEDURE — 88304 TISSUE EXAM BY PATHOLOGIST: CPT | Mod: TC,ORL | Performed by: OTOLARYNGOLOGY

## 2023-03-23 LAB
PATH REPORT.COMMENTS IMP SPEC: NORMAL
PATH REPORT.COMMENTS IMP SPEC: NORMAL
PATH REPORT.FINAL DX SPEC: NORMAL
PATH REPORT.GROSS SPEC: NORMAL
PATH REPORT.MICROSCOPIC SPEC OTHER STN: NORMAL
PATH REPORT.RELEVANT HX SPEC: NORMAL
PHOTO IMAGE: NORMAL

## 2023-03-23 PROCEDURE — 88304 TISSUE EXAM BY PATHOLOGIST: CPT | Mod: 26 | Performed by: PATHOLOGY

## 2023-08-29 ENCOUNTER — MYC MEDICAL ADVICE (OUTPATIENT)
Dept: FAMILY MEDICINE | Facility: CLINIC | Age: 33
End: 2023-08-29
Payer: COMMERCIAL

## 2023-08-29 DIAGNOSIS — M25.572 PAIN IN JOINT, ANKLE AND FOOT, LEFT: Primary | ICD-10-CM

## 2023-09-18 ENCOUNTER — THERAPY VISIT (OUTPATIENT)
Dept: PHYSICAL THERAPY | Facility: CLINIC | Age: 33
End: 2023-09-18
Attending: FAMILY MEDICINE
Payer: COMMERCIAL

## 2023-09-18 DIAGNOSIS — M25.572 PAIN IN JOINT, ANKLE AND FOOT, LEFT: ICD-10-CM

## 2023-09-18 PROCEDURE — 97110 THERAPEUTIC EXERCISES: CPT | Mod: GP | Performed by: PHYSICAL THERAPIST

## 2023-09-18 PROCEDURE — 97161 PT EVAL LOW COMPLEX 20 MIN: CPT | Mod: GP | Performed by: PHYSICAL THERAPIST

## 2023-09-18 NOTE — PROGRESS NOTES
PHYSICAL THERAPY EVALUATION  Type of Visit: Evaluation    See electronic medical record for Abuse and Falls Screening details.    Subjective       Presenting condition or subjective complaint: Medial ankle pain while running L>R. Pain and swelling posterior to medial malleolus  Date of onset: 05/18/23    Relevant medical history: Neck injury   Dates & types of surgery: None to lower extremity    Prior diagnostic imaging/testing results:       Prior therapy history for the same diagnosis, illness or injury: No      History of current condition: Patient reports medial L ankle pain that started with increased running. About a month in after consistent running, she started getting consistent pain and swelling in the medial ankle. When she rests it feels better but running aggravates after 2-3 total runs in the week (about 2 miles distance). Mentions mild left sided weakness due to some spinal cord compression in her neck; she reports decreased  strength and L glute strength.      Prior Level of Function  Transfers: Independent  Ambulation: Independent  ADL: Independent    Living Environment  Social support: With a significant other or spouse   Type of home: House   Stairs to enter the home: Yes 2 Is there a railing: No   Ramp: No   Stairs inside the home: Yes 8 Is there a railing: Yes   Help at home: None  Equipment owned:       Employment: Yes Physical Therapist  Hobbies/Interests: Mountain biking, running, cross country skiing, snowboarding    Patient goals for therapy: Run for exercise    Pain assessment: See objective evaluation for additional pain details     Objective   FOOT/ANKLE EVALUATION  PAIN: Pain Level at Rest: 0/10  Pain Level with Use: 7/10  Pain Location: medial ankle, posterior tibialis tendon  Pain Quality: sharp when running  Pain Frequency: when running  Pain is Worst: after running 2-3x in a week  Pain is Exacerbated By: running  Pain is Relieved By: not running  Pain Progression: worse with  "running, no different at rest  INTEGUMENTARY (edema, incisions): WNL  POSTURE: WNL (normal arch and calcaneal angle at rest and with weight bearing)  GAIT:   Weightbearing Status: WBAT  Assistive Device(s): None  Gait Deviations: increased lateral heel strike with hyperpronation on L foot; R appears normal; \"snapping\" motion  BALANCE/PROPRIOCEPTION:   WEIGHT BEARING ALIGNMENT:   NON-WEIGHTBEARING ALIGNMENT:    ROM: AROM WNL    STRENGTH:  Ankle: 5/5 PF, DF B; L 4+/5 INV, 5-/5 EV; R 5/5 EV and INV  FLEXIBILITY:   SPECIAL TESTS:   FUNCTIONAL TESTS: Step Test: 4\" step down with L knee valgus/Hip IR with calcaneal valgus (no issues with RLE)  PALPATION:  Post tibialis tendon  JOINT MOBILITY:  WNL throughout ankle    Assessment & Plan   CLINICAL IMPRESSIONS  Medical Diagnosis: Pain in joint, ankle and foot, left    Treatment Diagnosis: L posterior tibialis tendonitis, L ankle pain   Impression/Assessment: Patient is a 32 year old female with left ankle complaints.  The following significant findings have been identified: Pain, Decreased ROM/flexibility, Decreased joint mobility, Decreased strength, Impaired balance, Impaired gait, and Impaired muscle performance. These impairments interfere with their ability to perform recreational activities and community mobility as compared to previous level of function.     Clinical Decision Making (Complexity):  Clinical Presentation: Stable/Uncomplicated  Clinical Presentation Rationale: based on medical and personal factors listed in PT evaluation  Clinical Decision Making (Complexity): Low complexity    PLAN OF CARE  Treatment Interventions:  Interventions: Manual Therapy, Neuromuscular Re-education, Therapeutic Activity, Therapeutic Exercise, Self-Care/Home Management    Long Term Goals     PT Goal 1  Goal Identifier: LTG 1  Goal Description: Patient will be able to run 30 minutes 3x/week without pain in left ankle during or after activity  Rationale: to maximize safety and " independence with self cares;to maximize safety and independence within the community  Target Date: 11/13/23      Frequency of Treatment: 1x/week  Duration of Treatment: 4 weeks then 1x/month for 2 months    Recommended Referrals to Other Professionals:  none  Education Assessment:   Learner/Method: No Barriers to Learning;Pictures/Video;Demonstration;Reading;Listening;Patient    Risks and benefits of evaluation/treatment have been explained.   Patient/Family/caregiver agrees with Plan of Care.     Evaluation Time:     PT Eval, Low Complexity Minutes (19311): 18       Signing Clinician: Bennett Feliz PT

## 2023-09-21 ENCOUNTER — OFFICE VISIT (OUTPATIENT)
Dept: FAMILY MEDICINE | Facility: CLINIC | Age: 33
End: 2023-09-21
Payer: COMMERCIAL

## 2023-09-21 VITALS
SYSTOLIC BLOOD PRESSURE: 116 MMHG | DIASTOLIC BLOOD PRESSURE: 72 MMHG | RESPIRATION RATE: 16 BRPM | BODY MASS INDEX: 30.06 KG/M2 | OXYGEN SATURATION: 99 % | WEIGHT: 210 LBS | HEART RATE: 92 BPM | TEMPERATURE: 97.7 F | HEIGHT: 70 IN

## 2023-09-21 DIAGNOSIS — Z30.46 NEXPLANON REMOVAL: Primary | ICD-10-CM

## 2023-09-21 PROCEDURE — 11982 REMOVE DRUG IMPLANT DEVICE: CPT | Performed by: FAMILY MEDICINE

## 2023-09-21 ASSESSMENT — PAIN SCALES - GENERAL: PAINLEVEL: NO PAIN (0)

## 2023-09-21 NOTE — PROGRESS NOTES
"  Assessment & Plan     Nexplanon removal  Removed as below.  - REMOVAL NEXPLANON             BMI:   Estimated body mass index is 30.57 kg/m  as calculated from the following:    Height as of this encounter: 1.765 m (5' 9.5\").    Weight as of this encounter: 95.3 kg (210 lb).           Gris Gomez MD  [facility]    Jayme Goldstein is a 32 year old, presenting for the following health issues:  Contraception      HPI       Nexplanon removal        Review of Systems   Constitutional, neuro, ENT, endocrine, pulmonary, cardiac, gastrointestinal, genitourinary, musculoskeletal, integument and psychiatric systems are negative, except as otherwise noted.       Objective    /72   Pulse 92   Temp 97.7  F (36.5  C) (Tympanic)   Resp 16   Ht 1.765 m (5' 9.5\")   Wt 95.3 kg (210 lb)   SpO2 99%   BMI 30.57 kg/m    Body mass index is 30.57 kg/m .  Physical Exam   GENERAL: Pleasant, well appearing female.      PROCEDURE: Nexplanon removal  Performing Physician: Gris Gomez MD     Anesthesia: 1cc 1% Lidocaine w/ epinephrine    Procedure: Consent obtained. The area surrounding the distal edge of Nexplanon was prepared with Choloraprep and draped in the usual sterile manner. The site was anesthetized with lidocaine. A perpendicular skin incision was made over the distal aspect of the device. The capsule lysed sharply and the device removed using a hemostat. Hemostasis was assured. The site was dressed with SteriStrips and a pressure dressing. The patient tolerated the procedure well.     Followup: The patient tolerated the procedure well without complications. Standard post-procedure care is explained and return precautions are given. Contraception is advised until conception is desired.                   "

## 2023-09-21 NOTE — NURSING NOTE
"Initial /72   Pulse 92   Temp 97.7  F (36.5  C) (Tympanic)   Resp 16   Ht 1.765 m (5' 9.5\")   Wt 95.3 kg (210 lb)   SpO2 99%   BMI 30.57 kg/m   Estimated body mass index is 30.57 kg/m  as calculated from the following:    Height as of this encounter: 1.765 m (5' 9.5\").    Weight as of this encounter: 95.3 kg (210 lb). .    "

## 2023-09-25 ENCOUNTER — THERAPY VISIT (OUTPATIENT)
Dept: PHYSICAL THERAPY | Facility: CLINIC | Age: 33
End: 2023-09-25
Attending: FAMILY MEDICINE
Payer: COMMERCIAL

## 2023-09-25 DIAGNOSIS — M25.572 PAIN IN JOINT, ANKLE AND FOOT, LEFT: Primary | ICD-10-CM

## 2023-09-25 PROCEDURE — 97110 THERAPEUTIC EXERCISES: CPT | Mod: GP | Performed by: PHYSICAL THERAPIST

## 2023-09-25 PROCEDURE — 97112 NEUROMUSCULAR REEDUCATION: CPT | Mod: GP | Performed by: PHYSICAL THERAPIST

## 2023-10-07 ASSESSMENT — PATIENT HEALTH QUESTIONNAIRE - PHQ9
SUM OF ALL RESPONSES TO PHQ QUESTIONS 1-9: 0
SUM OF ALL RESPONSES TO PHQ QUESTIONS 1-9: 0
10. IF YOU CHECKED OFF ANY PROBLEMS, HOW DIFFICULT HAVE THESE PROBLEMS MADE IT FOR YOU TO DO YOUR WORK, TAKE CARE OF THINGS AT HOME, OR GET ALONG WITH OTHER PEOPLE: NOT DIFFICULT AT ALL

## 2023-10-07 ASSESSMENT — ANXIETY QUESTIONNAIRES
GAD7 TOTAL SCORE: 2
1. FEELING NERVOUS, ANXIOUS, OR ON EDGE: NOT AT ALL
GAD7 TOTAL SCORE: 2
7. FEELING AFRAID AS IF SOMETHING AWFUL MIGHT HAPPEN: NOT AT ALL
2. NOT BEING ABLE TO STOP OR CONTROL WORRYING: NOT AT ALL
6. BECOMING EASILY ANNOYED OR IRRITABLE: SEVERAL DAYS
3. WORRYING TOO MUCH ABOUT DIFFERENT THINGS: NOT AT ALL
IF YOU CHECKED OFF ANY PROBLEMS ON THIS QUESTIONNAIRE, HOW DIFFICULT HAVE THESE PROBLEMS MADE IT FOR YOU TO DO YOUR WORK, TAKE CARE OF THINGS AT HOME, OR GET ALONG WITH OTHER PEOPLE: NOT DIFFICULT AT ALL
5. BEING SO RESTLESS THAT IT IS HARD TO SIT STILL: NOT AT ALL
4. TROUBLE RELAXING: SEVERAL DAYS

## 2023-10-13 ENCOUNTER — OFFICE VISIT (OUTPATIENT)
Dept: FAMILY MEDICINE | Facility: CLINIC | Age: 33
End: 2023-10-13
Payer: COMMERCIAL

## 2023-10-13 VITALS
HEIGHT: 69 IN | DIASTOLIC BLOOD PRESSURE: 82 MMHG | TEMPERATURE: 98.3 F | HEART RATE: 71 BPM | WEIGHT: 213 LBS | OXYGEN SATURATION: 99 % | SYSTOLIC BLOOD PRESSURE: 126 MMHG | BODY MASS INDEX: 31.55 KG/M2

## 2023-10-13 DIAGNOSIS — Z76.89 ENCOUNTER TO ESTABLISH CARE WITH NEW DOCTOR: Primary | ICD-10-CM

## 2023-10-13 DIAGNOSIS — Z31.69 ENCOUNTER FOR PRECONCEPTION CONSULTATION: ICD-10-CM

## 2023-10-13 DIAGNOSIS — R87.612 LGSIL ON PAP SMEAR OF CERVIX: ICD-10-CM

## 2023-10-13 DIAGNOSIS — F41.9 ANXIETY: ICD-10-CM

## 2023-10-13 PROCEDURE — 99213 OFFICE O/P EST LOW 20 MIN: CPT | Performed by: STUDENT IN AN ORGANIZED HEALTH CARE EDUCATION/TRAINING PROGRAM

## 2023-10-13 RX ORDER — PRENATAL VIT/IRON FUM/FOLIC AC 27MG-0.8MG
1 TABLET ORAL DAILY
Qty: 90 TABLET | Refills: 3 | Status: SHIPPED | OUTPATIENT
Start: 2023-10-13

## 2023-10-13 NOTE — PROGRESS NOTES
Assessment & Plan     Encounter to establish care with new doctor    Encounter for preconception consultation  Had nexplanon removed one month ago. Has started taking OTC PNV, will send in PNV. Working on weight loss with changes to healthier eating habits and increasing physical activity again after neurologic concerns last year. Discussed okay to continue this, still having good nutrition. Limit alcohol use. Monitor menstrual cycles since nexplanon recently removed. Can continue celexa, avoid klonopin during pregnancy  - Prenatal Vit-Fe Fumarate-FA (PRENATAL MULTIVITAMIN W/IRON) 27-0.8 MG tablet; Take 1 tablet by mouth daily    Anxiety  We will continue celexa and discussed when pregnant avoiding klonopin. Currently on average takes 1/2 tablet klonopin every couple of months when her anxiety is worse. Discussed option that if this worsens can increase celexa dose if needed.     LSIL on pap smear of cervix  Due for next pap 02/2024. HPV negative       Christ[facility] Parker [facility] Gold[facility] is a 33 year old, presenting for the following health issues:  Establish Care        10/13/2023     6:52 AM   Additional Questions   Accompanied by na         10/13/2023     6:52 AM   Patient Reported Additional Medications   Patient reports taking the following new medications none       History of Present Illness       Reason for visit:  Establish new PCP, review meds, prenatal counseling    She eats 4 or more servings of fruits and vegetables daily.She consumes 0 sweetened beverage(s) daily.She exercises with enough effort to increase her heart rate 30 to 60 minutes per day.  She exercises with enough effort to increase her heart rate 4 days per week.   She is taking medications regularly.      nexplanon removed 09/21/2023. Desires pregnancy.     Has been on contraception since 13 yo. While on nexplanon would get 2 day long periods    Has been trying to get back into exercise and  "weight loss.   Last year had a neurologic injury last year. Was on gabapentin. Had MRI and LP.     3 meals per day with snacks between. More protein less carbs. No drinking. Healthier eating changes. More vegetarian.     Exercise: doing 20 minutes of exercise every morning, 2 mile run, yoga. 4-5 days per week.     Anxiety - on celexa 20 mg and PRN klonopin once every couple months.       Review of Systems   Constitutional, HEENT, cardiovascular, pulmonary, gi and gu systems are negative, except as otherwise noted.      Objective    /82   Pulse 71   Temp 98.3  F (36.8  C) (Oral)   Ht 1.765 m (5' 9.49\")   Wt 96.6 kg (213 lb)   LMP 08/20/2023 (Exact Date)   SpO2 99%   BMI 31.01 kg/m    Body mass index is 31.01 kg/m .  Physical Exam                         "

## 2023-11-06 DIAGNOSIS — F41.9 ANXIETY: ICD-10-CM

## 2023-11-06 RX ORDER — CLONAZEPAM 0.5 MG/1
0.25 TABLET ORAL 2 TIMES DAILY PRN
Qty: 20 TABLET | Refills: 0 | Status: SHIPPED | OUTPATIENT
Start: 2023-11-06

## 2023-12-19 NOTE — PROGRESS NOTES
OP Physical Therapy Discharge Note    DISCHARGE  Reason for Discharge: Patient has met all goals.    Equipment Issued: none    Discharge Plan: Patient to continue home program.    Referring Provider:  Gris Gomez

## 2024-01-02 ENCOUNTER — OFFICE VISIT (OUTPATIENT)
Dept: OBGYN | Facility: CLINIC | Age: 34
End: 2024-01-02
Payer: COMMERCIAL

## 2024-01-02 VITALS
BODY MASS INDEX: 31.49 KG/M2 | WEIGHT: 216.3 LBS | SYSTOLIC BLOOD PRESSURE: 126 MMHG | OXYGEN SATURATION: 100 % | HEART RATE: 92 BPM | DIASTOLIC BLOOD PRESSURE: 84 MMHG

## 2024-01-02 DIAGNOSIS — E28.2 PCOS (POLYCYSTIC OVARIAN SYNDROME): Primary | ICD-10-CM

## 2024-01-02 LAB
ALBUMIN SERPL BCG-MCNC: 4.9 G/DL (ref 3.5–5.2)
ALP SERPL-CCNC: 136 U/L (ref 40–150)
ALT SERPL W P-5'-P-CCNC: 63 U/L (ref 0–50)
ANION GAP SERPL CALCULATED.3IONS-SCNC: 8 MMOL/L (ref 7–15)
AST SERPL W P-5'-P-CCNC: 39 U/L (ref 0–45)
BILIRUB SERPL-MCNC: <0.2 MG/DL
BUN SERPL-MCNC: 10.3 MG/DL (ref 6–20)
CALCIUM SERPL-MCNC: 9.8 MG/DL (ref 8.6–10)
CHLORIDE SERPL-SCNC: 103 MMOL/L (ref 98–107)
CREAT SERPL-MCNC: 0.73 MG/DL (ref 0.51–0.95)
DEPRECATED HCO3 PLAS-SCNC: 28 MMOL/L (ref 22–29)
EGFRCR SERPLBLD CKD-EPI 2021: >90 ML/MIN/1.73M2
ERYTHROCYTE [DISTWIDTH] IN BLOOD BY AUTOMATED COUNT: 12.1 % (ref 10–15)
GLUCOSE SERPL-MCNC: 112 MG/DL (ref 70–99)
HBA1C MFR BLD: 5.4 % (ref 0–5.6)
HCT VFR BLD AUTO: 39.8 % (ref 35–47)
HGB BLD-MCNC: 13.2 G/DL (ref 11.7–15.7)
MCH RBC QN AUTO: 28.9 PG (ref 26.5–33)
MCHC RBC AUTO-ENTMCNC: 33.2 G/DL (ref 31.5–36.5)
MCV RBC AUTO: 87 FL (ref 78–100)
PLATELET # BLD AUTO: 331 10E3/UL (ref 150–450)
POTASSIUM SERPL-SCNC: 3.8 MMOL/L (ref 3.4–5.3)
PROT SERPL-MCNC: 7.8 G/DL (ref 6.4–8.3)
RBC # BLD AUTO: 4.56 10E6/UL (ref 3.8–5.2)
SODIUM SERPL-SCNC: 139 MMOL/L (ref 135–145)
TSH SERPL DL<=0.005 MIU/L-ACNC: 1.84 UIU/ML (ref 0.3–4.2)
WBC # BLD AUTO: 13.7 10E3/UL (ref 4–11)

## 2024-01-02 PROCEDURE — 84146 ASSAY OF PROLACTIN: CPT | Performed by: GENERAL PRACTICE

## 2024-01-02 PROCEDURE — 99204 OFFICE O/P NEW MOD 45 MIN: CPT | Performed by: GENERAL PRACTICE

## 2024-01-02 PROCEDURE — 83002 ASSAY OF GONADOTROPIN (LH): CPT | Performed by: GENERAL PRACTICE

## 2024-01-02 PROCEDURE — 83036 HEMOGLOBIN GLYCOSYLATED A1C: CPT | Performed by: GENERAL PRACTICE

## 2024-01-02 PROCEDURE — 85027 COMPLETE CBC AUTOMATED: CPT | Performed by: GENERAL PRACTICE

## 2024-01-02 PROCEDURE — 87389 HIV-1 AG W/HIV-1&-2 AB AG IA: CPT | Performed by: GENERAL PRACTICE

## 2024-01-02 PROCEDURE — 86803 HEPATITIS C AB TEST: CPT | Performed by: GENERAL PRACTICE

## 2024-01-02 PROCEDURE — 36415 COLL VENOUS BLD VENIPUNCTURE: CPT | Performed by: GENERAL PRACTICE

## 2024-01-02 PROCEDURE — 83001 ASSAY OF GONADOTROPIN (FSH): CPT | Performed by: GENERAL PRACTICE

## 2024-01-02 PROCEDURE — 86780 TREPONEMA PALLIDUM: CPT | Performed by: GENERAL PRACTICE

## 2024-01-02 PROCEDURE — 82627 DEHYDROEPIANDROSTERONE: CPT | Performed by: GENERAL PRACTICE

## 2024-01-02 PROCEDURE — 80053 COMPREHEN METABOLIC PANEL: CPT | Performed by: GENERAL PRACTICE

## 2024-01-02 PROCEDURE — 84403 ASSAY OF TOTAL TESTOSTERONE: CPT | Performed by: GENERAL PRACTICE

## 2024-01-02 PROCEDURE — 86762 RUBELLA ANTIBODY: CPT | Performed by: GENERAL PRACTICE

## 2024-01-02 PROCEDURE — 83498 ASY HYDROXYPROGESTERONE 17-D: CPT | Performed by: GENERAL PRACTICE

## 2024-01-02 PROCEDURE — 84443 ASSAY THYROID STIM HORMONE: CPT | Performed by: GENERAL PRACTICE

## 2024-01-02 PROCEDURE — 86787 VARICELLA-ZOSTER ANTIBODY: CPT | Performed by: GENERAL PRACTICE

## 2024-01-02 PROCEDURE — 84270 ASSAY OF SEX HORMONE GLOBUL: CPT | Performed by: GENERAL PRACTICE

## 2024-01-02 PROCEDURE — 87340 HEPATITIS B SURFACE AG IA: CPT | Performed by: GENERAL PRACTICE

## 2024-01-02 NOTE — PATIENT INSTRUCTIONS
If you have labs or imaging done, the results will automatically release in [employer] without an interpretation.  Your health care professional will review those results and send an interpretation with recommendations as soon as possible, but this may be 1-3 business days.    If you have any questions regarding your visit, please contact your care team.     [employer] Access Services: [phone]  [facility] CLINIC HOURS TELEPHONE NUMBER   Nba STARKS Shilo .      Anaya-STUART Jewell-STUART Castillo-Surgery Scheduler  Alia-         Monday-[place]  8:00 am-4:00 pm  [facility]  [facility]:00 pm  Wednesday-[place] 8:00 am-4:00 pm  Thursday-[place] 8:00 am-4:00 pm    Typical Surgery Day Friday [facility]  [address] [place] [address]  PH: [phone] Fax    Imaging Scheduling all locations  PH: [phone]     [facility] Labor and Delivery  [address]  [place], [place] [address] [phone]    [facility]  [address] [place] [address]  PH: [phone]     **Surgeries** Our Surgery Schedulers will contact you to schedule. If you do not receive a call within 3 business days, please call [phone].  Urgent Care locations:  [facility]       Monday-Friday   10 am - 8 pm  Saturday and Sunday   9 am - 5 pm   [phone]   If you need a medication refill, please contact your pharmacy. Please allow 3 business days for your refill to be completed.  As always, Thank you for trusting us with your healthcare needs!

## 2024-01-02 NOTE — PROGRESS NOTES
HPI:   Angelita is a 33 year old  here for oligomenorrhea and fertility questions. Nexplanon was removed in 2023 and had menses 2 weeks later. Today reports possible start of bleeding again, but no cycles during the interim 3 months. Used OPK which 1 day was possibly positive. Has bee non hormonal contraception the majority of her life.           ROS:   Weight loss or gain: denies  Abdominal bloating / pain: denies  Appetite: normal  Energy: normal  Nausea / vomiting: denies  Fevers / chills / night sweats: denies  SOB / cough: denies  Chest pain / palpitations: denies  Diarrhea / constipation: denies  Bloody / tar-colored stools: denies  Urinary frequency / urgency / blood: denies  Headaches / vision changes: denies  Mouth sores: denies  Skin changes / rashes: denies      GYNHx:   Patient's last menstrual period was 10/11/2023 (exact date).  Cycles: unknown. On hormones for most adult life  Menorrhagia: denies  Dysmenorrhea: denies  Last paps:  LSIL/HPV-   [id number]  Lab Results   Component Value Date    PAP ASC-US 2019    PAP NIL 2016    PAP NIL 2012     History STI: denies  Gardasil Hx: recieved    OBHx:  OB History    Para Term  AB Living   0 0 0 0 0 0   SAB IAB Ectopic Multiple Live Births   0 0 0 0 0        PSH:   Past Surgical History:   Procedure Laterality Date    EXCISE MASS BUTTOCK(S) Right 3/7/2019    Procedure: Excision of Right Buttocks Mass;  Surgeon: Carmelina Barr MD;  Location: [facility] OR    HERNIA REPAIR, INGUINAL RT/LT      Right    MOUTH SURGERY  2009    Benoit teeth    SOFT TISSUE SURGERY  3/7/19    Mass excision R buttock       PMH:  Past Medical History:   Diagnosis Date    Abnormal Pap smear of cervix 2019    See problem list    Irregular menses     Menarche     age 15    Anxiety    MEDs   Current Outpatient Medications   Medication    citalopram (CELEXA) 20 MG tablet    clonazePAM (KLONOPIN) 0.5 MG tablet    Prenatal Vit-Fe  Fumarate-FA (PRENATAL MULTIVITAMIN W/IRON) 27-0.8 MG tablet     No current facility-administered medications for this visit.       Allergies:  No Known Allergies    FamHx:  Family History   Problem Relation Age of Onset    Hypertension Father     Anxiety Disorder Maternal Grandmother     Colon Cancer Maternal Grandfather     Cancer Maternal Grandfather         lung    Heart Disease Maternal Grandfather         valve replaced    Diabetes Paternal Grandmother     Arthritis Paternal Grandfather     Cancer Paternal Grandfather         skin    Breast Cancer Other     Colon Cancer Other    Denies breast, uterine, ovarian cancers    SocHx:  Social History     Socioeconomic History    Marital status: Single     Spouse name: Not on file    Number of children: 0    Years of education: Not on file    Highest education level: Not on file   Occupational History     Employer: STUDENT     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 No   Social History Narrative    Not on file     Social Determinants of Health     Financial Resource Strain: Low Risk  (9/20/2023)    Financial Resource Strain     Within the past 12 months, have you or your family members you live with been unable to get utilities (heat, electricity) when it was really needed?: No   Food Insecurity: Low Risk  (9/20/2023)    Food Insecurity     Within the past 12 months, did you worry that your food would run out before you got money to buy more?: No     Within the past 12 months, did the food you bought just not last and you  didn t have money to get more?: No   Transportation Needs: Low Risk  (9/20/2023)    Transportation Needs     Within the past 12 months, has lack of transportation kept you from medical appointments, getting your medicines, non-medical meetings or appointments, work, or from getting things that you need?: No   Physical Activity: Not on file   Stress: Not on file   Social Connections: Not on file   Interpersonal Safety: Low Risk  (9/21/2023)    Interpersonal Safety     Do you feel physically and emotionally safe where you currently live?: Yes     Within the past 12 months, have you been hit, slapped, kicked or otherwise physically hurt by someone?: No     Within the past 12 months, have you been humiliated or emotionally abused in other ways by your partner or ex-partner?: No   Housing Stability: Low Risk  (9/20/2023)    Housing Stability     Do you have housing? : Yes     Are you worried about losing your housing?: No             PE:   /84 (BP Location: Right arm, Patient Position: Chair, Cuff Size: Adult Large)   Pulse 92   Wt 98.1 kg (216 lb 4.8 oz)   LMP 10/11/2023 (Exact Date)   SpO2 100%   BMI 31.49 kg/m    Body mass index is 31.49 kg/m .    General Appearance:  healthy, alert, active, no distress  HEENT: NC/AT, supple, trachea midline, no goiter  CV: well perfused  Lungs: non-labored breathing  Breast: not performed  Neuro: normal gait, balance  Skin: no lesions, visible rashes/bruising. Terminal hair growth on face, arms  Psych: appropriate mood/affect  Abdomen: deferred  Pelvic: deferred      RADS  None for review      LABS  PCOS labs from 2019 reviewed        A/P:  32yo g0 F with likely PCOS given oligomenorrhea and hirsutism. PCOS and infertility labs ordered today to r/o other origin. Reviewed risks and concerns in women with PCOS. HgA1c ordered today as well. Reviewed likely lack of ovulation. Given she is potentially starting a menses today, we could check progesterone level on day 21. We also  reviewed risks and benefits of ovulation induction agents to include clomid and letrozole. Ovulation induction with letrozole has recently been shown to lead to higher live birth rates compared to clomid.  Possible side effects include but are not limited to:  medication side effects, hyper-response necessitating cancellation of the cycle, ovarian hyperstimulation syndrome, and multiple gestations.  3-6 cycles of ovulation induction are typically performed with evaluation and possible escalation of treatment after 3 ovulatory cycles.  The cumulative pregnancy rate with ovulation induction is around 30-35%.  There is a risk of twinning, owing to multi-follicular development, of approximately 7-9%.  Patient to consider ovulation induction therapy . All questions answered. Patient has my contact information for follow up after discussion with spouse.         45 min spent on the date of the encounter in chart review, patient visit, review of tests, documentation and/or discussion with other providers about the issues documented above.     Nba Lao DO, FACOG

## 2024-01-03 LAB
DHEA-S SERPL-MCNC: 317 UG/DL (ref 35–430)
FSH SERPL IRP2-ACNC: 3.6 MIU/ML
HBV SURFACE AG SERPL QL IA: NONREACTIVE
HCV AB SERPL QL IA: NONREACTIVE
HIV 1+2 AB+HIV1 P24 AG SERPL QL IA: NONREACTIVE
LH SERPL-ACNC: 6.9 MIU/ML
PROLACTIN SERPL 3RD IS-MCNC: 18 NG/ML (ref 5–23)
RUBV IGG SERPL QL IA: 1.36 INDEX
RUBV IGG SERPL QL IA: POSITIVE
SHBG SERPL-SCNC: 26 NMOL/L (ref 30–135)
T PALLIDUM AB SER QL: NONREACTIVE
VZV IGG SER QL IA: 2935 INDEX
VZV IGG SER QL IA: POSITIVE

## 2024-01-04 DIAGNOSIS — N97.9 FEMALE INFERTILITY: Primary | ICD-10-CM

## 2024-01-04 LAB — 17OHP SERPL-MCNC: 31 NG/DL

## 2024-01-04 RX ORDER — LETROZOLE 2.5 MG/1
TABLET, FILM COATED ORAL
Qty: 5 TABLET | Refills: 0 | Status: SHIPPED | OUTPATIENT
Start: 2024-01-04 | End: 2024-05-09

## 2024-01-05 LAB
TESTOST FREE SERPL-MCNC: 0.76 NG/DL
TESTOST SERPL-MCNC: 37 NG/DL (ref 8–60)

## 2024-01-11 NOTE — PATIENT INSTRUCTIONS
If you have labs or imaging done, the results will automatically release in [employer] without an interpretation.  Your health care professional will review those results and send an interpretation with recommendations as soon as possible, but this may be 1-3 business days.    If you have any questions regarding your visit, please contact your care team.     [employer] Access Services: [phone]  [facility] CLINIC HOURS TELEPHONE NUMBER   Nba STARKS Shilo .      Anaya-STUART Jewell-STUART Castillo-Surgery Scheduler  Alia-         Monday-[place]  8:00 am-4:00 pm  [facility]  [facility]-4:00 pm  Wednesday-[place] 8:00 am-4:00 pm  Thursday-[place] 8:00 am-4:00 pm    Typical Surgery Day Friday [facility]  [address] [place] [address]  PH: [phone] Fax    Imaging Scheduling all locations  PH: [phone]     [facility] Labor and Delivery  [address]  [place], [place] [address] [phone]    [facility]  [address] [place] [address]  PH: [phone]     **Surgeries** Our Surgery Schedulers will contact you to schedule. If you do not receive a call within 3 business days, please call [phone].  Urgent Care locations:  [facility]       Monday-Friday   10 am - 8 pm  Saturday and Sunday   9 am - 5 pm   [phone]   If you need a medication refill, please contact your pharmacy. Please allow 3 business days for your refill to be completed.  As always, Thank you for trusting us with your healthcare needs!

## 2024-01-15 ENCOUNTER — OFFICE VISIT (OUTPATIENT)
Dept: OBGYN | Facility: CLINIC | Age: 34
End: 2024-01-15
Payer: COMMERCIAL

## 2024-01-15 VITALS
WEIGHT: 210.4 LBS | DIASTOLIC BLOOD PRESSURE: 77 MMHG | OXYGEN SATURATION: 98 % | HEART RATE: 105 BPM | SYSTOLIC BLOOD PRESSURE: 143 MMHG | BODY MASS INDEX: 30.64 KG/M2

## 2024-01-15 DIAGNOSIS — E28.2 PCOS (POLYCYSTIC OVARIAN SYNDROME): ICD-10-CM

## 2024-01-15 DIAGNOSIS — N97.9 FEMALE INFERTILITY: Primary | ICD-10-CM

## 2024-01-15 PROCEDURE — 99213 OFFICE O/P EST LOW 20 MIN: CPT | Performed by: GENERAL PRACTICE

## 2024-01-15 NOTE — PROGRESS NOTES
Diagnosis:  PCOS  Treatment: Letrozole 2.5mg   CD3-7  Insemination plan:  timed intercourse  Treatment number: 1  Cycle day: 14     34yo G0 undergoing OI here for follicle scan following infertility treatment. Reports doing well this morning.  No F/C/N/V.  No abdominal pain.  No abnormal vaginal discharge.  No hot flashes.      Medication issues/side effects: none      BP (!) 143/77 (BP Location: Left arm, Patient Position: Sitting, Cuff Size: Adult Regular)   Pulse 105   Wt 95.4 kg (210 lb 6.4 oz)   LMP 01/02/2024 (Exact Date)   SpO2 98%   BMI 30.64 kg/m    Gen:  A&Ox3, NAD, pleasant, normal affect   Neck:  grossly normal. Midline trachea.  No apparent masses.    Neuro:  grossly intact CN II-XII  Skin:  no lesions, no acanthosis  :  normal external female genitalia.  No apparent lesions.      After discussion of medication course the decision was made to track follicular development via transvaginal ultrasound.    Chaperone: Maddie    Limited TVUS  EMS: 7.8mm and trilaminar  Right ov: 6 follicle, mostly <1cm. Largest 22.1x14.8mm  Left ov: >10 follicles all <1cm  No free fluid or TTP during exam    Labs reviewed.  Normality/abnormality of lab(s) assessed and used to assist in management/treatment decisions.    A/P: Limited vaginal ultrasound performed. Single large follicle in response to letrozole 2.5mg. Pt to use OPKs with anticipated ovulation in 3-4 days. Plan timed intercourse. Pt to follow up with HCG results. If negative, will increase letrozole dose to 5mg. All questions answered.     Nba Lao, DO

## 2024-01-29 ENCOUNTER — PATIENT OUTREACH (OUTPATIENT)
Dept: CARE COORDINATION | Facility: CLINIC | Age: 34
End: 2024-01-29
Payer: COMMERCIAL

## 2024-02-01 ENCOUNTER — MYC MEDICAL ADVICE (OUTPATIENT)
Dept: OBGYN | Facility: CLINIC | Age: 34
End: 2024-02-01
Payer: COMMERCIAL

## 2024-02-01 ENCOUNTER — TELEPHONE (OUTPATIENT)
Dept: OBGYN | Facility: CLINIC | Age: 34
End: 2024-02-01
Payer: COMMERCIAL

## 2024-02-01 DIAGNOSIS — N97.9 FEMALE INFERTILITY: Primary | ICD-10-CM

## 2024-02-01 RX ORDER — LETROZOLE 2.5 MG/1
5 TABLET, FILM COATED ORAL DAILY
Qty: 10 TABLET | Refills: 0 | Status: SHIPPED | OUTPATIENT
Start: 2024-02-01 | End: 2024-02-26

## 2024-02-01 NOTE — TELEPHONE ENCOUNTER
Unable to reach patient via phone. Left message to call back at [phone]. Dr. Lao would like to see patient in clinic 2/12/2024. Sending patient a [employer] message.    Danie Castillo CMA 2/1/2024 11:29 AM

## 2024-02-12 ENCOUNTER — PATIENT OUTREACH (OUTPATIENT)
Dept: CARE COORDINATION | Facility: CLINIC | Age: 34
End: 2024-02-12

## 2024-02-15 ENCOUNTER — PATIENT OUTREACH (OUTPATIENT)
Dept: FAMILY MEDICINE | Facility: CLINIC | Age: 34
End: 2024-02-15
Payer: COMMERCIAL

## 2024-02-15 DIAGNOSIS — R87.610 ASCUS OF CERVIX WITH NEGATIVE HIGH RISK HPV: Primary | ICD-10-CM

## 2024-02-26 ENCOUNTER — MYC MEDICAL ADVICE (OUTPATIENT)
Dept: OBGYN | Facility: CLINIC | Age: 34
End: 2024-02-26
Payer: COMMERCIAL

## 2024-02-26 DIAGNOSIS — N97.9 FEMALE INFERTILITY: ICD-10-CM

## 2024-02-26 RX ORDER — LETROZOLE 2.5 MG/1
5 TABLET, FILM COATED ORAL DAILY
Qty: 10 TABLET | Refills: 0 | Status: SHIPPED | OUTPATIENT
Start: 2024-02-26 | End: 2024-05-09

## 2024-02-26 NOTE — TELEPHONE ENCOUNTER
Pt last seen 1/15/2024 for fertility, letrozole last prescribed 2/1/2024 for 10 tablets with 0 refills to take 5 mg    Pt states her and partner were ill during last cycle. Pt on cycle day 2 today    RN routing to provider to advise on refill.    Anaya Wallis RN on 2/26/2024 at 8:41 AM

## 2024-02-26 NOTE — TELEPHONE ENCOUNTER
Nba Lao, DO   to Me   TC    2/26/24 12:16 PM  Please let Angelita know that this medication has been ordered. She should start taking it tomorrow. Please also let her know that I will be out of town during the time period when I would typically schedule her to come in for a follicle scan with me in the clinic. We will have to do this on the next cycle (if another cycle is needed).    Dr Lao

## 2024-02-29 DIAGNOSIS — F41.9 ANXIETY: ICD-10-CM

## 2024-02-29 RX ORDER — CITALOPRAM HYDROBROMIDE 20 MG/1
20 TABLET ORAL DAILY
Qty: 90 TABLET | Refills: 3 | OUTPATIENT
Start: 2024-02-29

## 2024-03-04 ENCOUNTER — MYC MEDICAL ADVICE (OUTPATIENT)
Dept: FAMILY MEDICINE | Facility: CLINIC | Age: 34
End: 2024-03-04
Payer: COMMERCIAL

## 2024-03-04 DIAGNOSIS — F41.9 ANXIETY: ICD-10-CM

## 2024-03-04 RX ORDER — CITALOPRAM HYDROBROMIDE 20 MG/1
20 TABLET ORAL DAILY
Qty: 90 TABLET | Refills: 0 | Status: SHIPPED | OUTPATIENT
Start: 2024-03-04 | End: 2024-05-22

## 2024-03-04 NOTE — TELEPHONE ENCOUNTER
Routing to PCP and covering pool    Patient was seen on 10/13/23 to establish care with Dr. Parker    She discussed how she takes celexa for anxiety    She is due for a refill as she is completely out of med    Willing to send rx?    Kelley Phan RN

## 2024-03-05 DIAGNOSIS — F41.9 ANXIETY: ICD-10-CM

## 2024-03-05 RX ORDER — CITALOPRAM HYDROBROMIDE 20 MG/1
20 TABLET ORAL DAILY
Qty: 90 TABLET | Refills: 0 | OUTPATIENT
Start: 2024-03-05

## 2024-03-29 ENCOUNTER — MYC MEDICAL ADVICE (OUTPATIENT)
Dept: OBGYN | Facility: CLINIC | Age: 34
End: 2024-03-29
Payer: COMMERCIAL

## 2024-03-29 DIAGNOSIS — N97.9 FEMALE INFERTILITY: ICD-10-CM

## 2024-03-29 NOTE — TELEPHONE ENCOUNTER
"Pt started her period today, 3/29.  She is requesting a refill of letrozole.    Per 1/15/24 OV plan:  \"A/P: Limited vaginal ultrasound performed. Single large follicle in response to letrozole 2.5mg. Pt to use OPKs with anticipated ovulation in 3-4 days. Plan timed intercourse. Pt to follow up with HCG results. If negative, will increase letrozole dose to 5mg. All questions answered.\"    Letrozole 2.5 mg tablets, to take 2 tablets (5 mg) was last prescribed by Dr. Lao on 2/26/24.  I do not see that pt was ordered or has done day #21 progesterone levels.      Pended letrozole and pt's desired pharmacy and routing to provider pool (Dr. Lao is out of office) to approve refill if appropriate.    Fanta Mayer RN      "

## 2024-04-01 DIAGNOSIS — N97.9 FEMALE INFERTILITY: Primary | ICD-10-CM

## 2024-04-01 RX ORDER — LETROZOLE 2.5 MG/1
5 TABLET, FILM COATED ORAL DAILY
Qty: 10 TABLET | Refills: 0 | Status: SHIPPED | OUTPATIENT
Start: 2024-04-01 | End: 2024-04-29

## 2024-04-01 RX ORDER — LETROZOLE 2.5 MG/1
5 TABLET, FILM COATED ORAL DAILY
Qty: 10 TABLET | Refills: 0 | OUTPATIENT
Start: 2024-04-01

## 2024-04-11 ENCOUNTER — OFFICE VISIT (OUTPATIENT)
Dept: OBGYN | Facility: CLINIC | Age: 34
End: 2024-04-11
Payer: COMMERCIAL

## 2024-04-11 VITALS
WEIGHT: 215.5 LBS | DIASTOLIC BLOOD PRESSURE: 67 MMHG | SYSTOLIC BLOOD PRESSURE: 115 MMHG | HEART RATE: 70 BPM | BODY MASS INDEX: 31.38 KG/M2

## 2024-04-11 DIAGNOSIS — N97.9 FEMALE INFERTILITY: Primary | ICD-10-CM

## 2024-04-11 DIAGNOSIS — E28.2 PCOS (POLYCYSTIC OVARIAN SYNDROME): ICD-10-CM

## 2024-04-11 PROCEDURE — 99212 OFFICE O/P EST SF 10 MIN: CPT | Performed by: GENERAL PRACTICE

## 2024-04-11 NOTE — PROGRESS NOTES
Diagnosis:  PCOS  Treatment: Letrozole 5mg   CD5-9  Insemination plan:  timed intercourse  Treatment number: 3 (1 @ 2.5mg, 2 @ 5mg)  Cycle day: 14      34yo G0 undergoing OI here for follicle scan following infertility treatment. Reports doing well this morning.  No F/C/N/V.  No abdominal pain.  No abnormal vaginal discharge.  No hot flashes.  OPKs have been positive on both prior OI cycles.      Medication issues/side effects: none        /67 (BP Location: Right arm, Patient Position: Sitting, Cuff Size: Adult Large)   Pulse 70   Wt 97.8 kg (215 lb 8 oz)   BMI 31.38 kg/m      Gen:  A&Ox3, NAD, pleasant, normal affect   Neck:  grossly normal. Midline trachea.  No apparent masses.    Neuro:  grossly intact CN II-XII  Skin:  no lesions, no acanthosis  :  normal external female genitalia.  No apparent lesions.      After discussion of medication course the decision was made to track follicular development via transvaginal ultrasound.    Chaperone: Cintia    Limited TVUS  EMS: 8.3mm and trilaminar  Right ov: 5-6 follicles, mostly <1cm. Largest 20.7mm x 17.8mm  Left ov: >15 follicles all <1cm  No free fluid or TTP during exam    Labs reviewed.  Normality/abnormality of lab(s) assessed and used to assist in management/treatment decisions.     A/P: Limited vaginal ultrasound performed. Single large follicle in response to letrozole 5mg. Pt to use OPKs with anticipated ovulation in 3-4 days. Plan timed intercourse. Pt to follow up with HCG results. If negative, will discuss with patient maintaining dose at 5mg letrozole or increase dose to 7.5mg. All questions answered. Pt in agreement with plan of care.      Nba Lao, DO

## 2024-04-11 NOTE — PATIENT INSTRUCTIONS
If you have labs or imaging done, the results will automatically release in [employer] without an interpretation.  Your health care professional will review those results and send an interpretation with recommendations as soon as possible, but this may be 1-3 business days.    If you have any questions regarding your visit, please contact your care team.     [employer] Access Services: [phone]  [facility] CLINIC HOURS TELEPHONE NUMBER   Nba STARKS Shilo .      Anaya-STUART Jewell-STUART Castillo-Surgery Scheduler  Alia-         Monday-[place]  8:00 am-4:00 pm  [facility]4:00 pm  Wednesday-[place] 8:00 am-4:00 pm  Thursday-[place] 8:00 am-4:00 pm    Typical Surgery Day Friday [facility]  [address] [place] [address]  PH: [phone] Fax    Imaging Scheduling all locations  PH: [phone]     [facility] Labor and Delivery  [address]  [place], [place] [address] [phone]    [facility]  [address] [place] [address]  PH: [phone]     **Surgeries** Our Surgery Schedulers will contact you to schedule. If you do not receive a call within 3 business days, please call [phone].  Urgent Care locations:  [facility]       Monday-Friday   10 am - 8 pm  Saturday and Sunday   9 am - 5 pm   [phone]   If you need a medication refill, please contact your pharmacy. Please allow 3 business days for your refill to be completed.  As always, Thank you for trusting us with your healthcare needs!

## 2024-04-16 NOTE — TELEPHONE ENCOUNTER
FYI to provider - Patient is lost to pap tracking follow-up. Attempts to contact pt have been made per reminder process and there has been no reply and/or no appt scheduled. Contact hx listed below.     12/17/12 NIL pap.  3/8/16 NIL pap, Neg HPV.  4/25/19 ASCUS pap, neg HR HPV. Plan cotest in 3 years.   4/6/22 ASCUS pap, neg HR HPV. Plan: cotest in 1 year  2/27/23 LSIL pap, neg HR HPV. Plan: Cotest in 1 yr.   3/6/23 My chart sent. Patient read same day  2/15/24 Reminder mychart  3/15/24 Reminder call -- left message  4/16/24 Lost to follow-up for pap tracking

## 2024-04-27 ENCOUNTER — HEALTH MAINTENANCE LETTER (OUTPATIENT)
Age: 34
End: 2024-04-27

## 2024-04-29 ENCOUNTER — MYC REFILL (OUTPATIENT)
Dept: OBGYN | Facility: CLINIC | Age: 34
End: 2024-04-29
Payer: COMMERCIAL

## 2024-04-29 DIAGNOSIS — N97.9 FEMALE INFERTILITY: ICD-10-CM

## 2024-04-29 RX ORDER — LETROZOLE 2.5 MG/1
7.5 TABLET, FILM COATED ORAL DAILY
Qty: 15 TABLET | Refills: 0 | Status: SHIPPED | OUTPATIENT
Start: 2024-04-29 | End: 2024-05-28

## 2024-04-29 NOTE — TELEPHONE ENCOUNTER
Requested Prescriptions   Pending Prescriptions Disp Refills    letrozole (FEMARA) 2.5 MG tablet 10 tablet 0     Sig: Take 2 tablets (5 mg) by mouth daily       There is no refill protocol information for this order        Last seen: 4/11/2024  Last refilled: 4/01/2024 for 10 tablets     Mychart message received from patient stating   Period started yesterday.   At last appt on 4/11/2024 pt's states Dr. Lao spoke about increasing the dose for this next round.     Routing to provider for eval & authorization.   Patient would like Rx sent to [facility] in [place] at the corner of [address].     Ceci KAPLAN RN

## 2024-05-08 NOTE — PATIENT INSTRUCTIONS
If you have labs or imaging done, the results will automatically release in [facility] without an interpretation.  Your health care professional will review those results and send an interpretation with recommendations as soon as possible, but this may be 1-3 business days.    If you have any questions regarding your visit, please contact your care team.     [employer]: [phone]  [facility] CLINIC HOURS TELEPHONE NUMBER   Nba STARKS Shilo .      Anaya-STUART Jewell-STUART Castillo-Surgery Scheduler  Alia-         Monday-[place]  8:00 am-4:00 pm  [facility]  Wednesday-[place] 8:00 am-4:00 pm  Thursday-[place] 8:00 am-4:00 pm    Typical Surgery Day Friday [facility]  [address] [place] [address]  PH: [phone] Fax    Imaging Scheduling all locations  PH: [phone]     [facility]  [address]  [place], [place] [address] [phone]    [facility]  [address] [place] [address]  PH: [phone]     **Surgeries** Our Surgery Schedulers will contact you to schedule. If you do not receive a call within 3 business days, please call [phone].  Urgent Care locations:  [facility]       Monday-Friday   10 am - 8 pm  Saturday and Sunday   9 am - 5 pm   [phone]   If you need a medication refill, please contact your pharmacy. Please allow 3 business days for your refill to be completed.  As always, Thank you for trusting us with your healthcare needs!

## 2024-05-09 ENCOUNTER — OFFICE VISIT (OUTPATIENT)
Dept: OBGYN | Facility: CLINIC | Age: 34
End: 2024-05-09
Payer: COMMERCIAL

## 2024-05-09 VITALS
SYSTOLIC BLOOD PRESSURE: 122 MMHG | DIASTOLIC BLOOD PRESSURE: 74 MMHG | HEIGHT: 69 IN | WEIGHT: 213.3 LBS | BODY MASS INDEX: 31.59 KG/M2 | HEART RATE: 64 BPM | OXYGEN SATURATION: 100 %

## 2024-05-09 DIAGNOSIS — N97.9 FEMALE INFERTILITY: Primary | ICD-10-CM

## 2024-05-09 DIAGNOSIS — E28.2 PCOS (POLYCYSTIC OVARIAN SYNDROME): ICD-10-CM

## 2024-05-09 PROCEDURE — 99213 OFFICE O/P EST LOW 20 MIN: CPT | Performed by: GENERAL PRACTICE

## 2024-05-09 NOTE — PROGRESS NOTES
"Diagnosis:  PCOS  Treatment: Letrozole 7.5mg   CD3-7  Insemination plan:  timed intercourse  Treatment number: 4 (1 @ 2.5mg, 2 @ 5mg, 1 @ 7.5mg)  Cycle day: 12      32yo G0 undergoing OI here for follicle scan following infertility treatment. Reports doing well this morning.  No F/C/N/V.  No abdominal pain.  No abnormal vaginal discharge.  No hot flashes.  OPKs have been positive on all prior OI cycles.      Medication issues/side effects: none        /74 (BP Location: Right arm, Patient Position: Sitting, Cuff Size: Adult Regular)   Pulse 64   Ht 1.765 m (5' 9.49\")   Wt 96.8 kg (213 lb 4.8 oz)   LMP 04/28/2024   SpO2 100%   BMI 31.06 kg/m         Gen:  A&Ox3, NAD, pleasant, normal affect   Neck:  grossly normal. Midline trachea.  No apparent masses.    Neuro:  grossly intact   Skin:  no lesions, no acanthosis  :  normal external female genitalia.  No apparent lesions.      After discussion of medication course the decision was made to track follicular development via transvaginal ultrasound.    Chaperone: Omni    Limited TVUS  EMS: 6.9mm and trilaminar  Right ov: 7-8 follicles, mostly <1cm. Largest 13mm x 9.3mm    Left ov: Up to 10 follicles, largest 14.3mm x 12.3mm; 10.1mm x 9.1mm; and 15.4mm x 13.8mm  No free fluid or TTP during exam    Labs reviewed.  Normality/abnormality of lab(s) assessed and used to assist in management/treatment decisions.     A/P: Limited vaginal ultrasound performed. Multiple follicles with good response to letrozole 7.5mg. Pt to use OPKs with anticipated ovulation in 5-7 days. Plan timed intercourse. Pt to follow up with HCG results. If negative, plan up to 3 cycles of letrozole 7.5mg. Consider moving to Infertility clinic if not successful after 3 rounds of current dose. All questions answered. Pt in agreement with plan of care.      Nba Lao, DO  "

## 2024-05-22 ENCOUNTER — MYC REFILL (OUTPATIENT)
Dept: FAMILY MEDICINE | Facility: CLINIC | Age: 34
End: 2024-05-22
Payer: COMMERCIAL

## 2024-05-22 DIAGNOSIS — F41.9 ANXIETY: ICD-10-CM

## 2024-05-22 RX ORDER — CITALOPRAM HYDROBROMIDE 20 MG/1
20 TABLET ORAL DAILY
Qty: 90 TABLET | Refills: 0 | Status: SHIPPED | OUTPATIENT
Start: 2024-05-22 | End: 2024-08-11

## 2024-05-28 ENCOUNTER — MYC REFILL (OUTPATIENT)
Dept: OBGYN | Facility: CLINIC | Age: 34
End: 2024-05-28
Payer: COMMERCIAL

## 2024-05-28 DIAGNOSIS — N97.9 FEMALE INFERTILITY: ICD-10-CM

## 2024-05-28 RX ORDER — LETROZOLE 2.5 MG/1
7.5 TABLET, FILM COATED ORAL DAILY
Qty: 15 TABLET | Refills: 0 | Status: SHIPPED | OUTPATIENT
Start: 2024-05-28 | End: 2024-07-01

## 2024-05-28 NOTE — TELEPHONE ENCOUNTER
"Requested Prescriptions   Pending Prescriptions Disp Refills    letrozole (FEMARA) 2.5 MG tablet 15 tablet 0     Sig: Take 3 tablets (7.5 mg) by mouth daily       There is no refill protocol information for this order        Pt last seen 5/9/2024 for female infertility: \"Consider moving to Infertility clinic if not successful after 3 rounds of current dose.     Last prescribed     Pt on cycle day 1 today- RN routing to provider to advise on refill    Anaya Wallis RN on 5/28/2024 at 10:56 AM    "

## 2024-05-28 NOTE — TELEPHONE ENCOUNTER
Nba Lao, DO   to Me   TC    5/28/24 12:43 PM  Please let Angelita know her letrazole order has been signed off.  Dr Lao

## 2024-07-01 ENCOUNTER — MYC REFILL (OUTPATIENT)
Dept: OBGYN | Facility: CLINIC | Age: 34
End: 2024-07-01
Payer: COMMERCIAL

## 2024-07-01 DIAGNOSIS — N97.9 FEMALE INFERTILITY: ICD-10-CM

## 2024-07-01 NOTE — TELEPHONE ENCOUNTER
"LMP: 6/28/24.    Per 5/9 OV plan:  \"Limited vaginal ultrasound performed. Multiple follicles with good response to letrozole 7.5mg. Pt to use OPKs with anticipated ovulation in 5-7 days. Plan timed intercourse. Pt to follow up with HCG results. If negative, plan up to 3 cycles of letrozole 7.5mg. Consider moving to Infertility clinic if not successful after 3 rounds of current dose.\"    Pt is currently on CD #4 and is wanting to start another round of letrozole tomorrow.    Routing to Dr. Lao and provider pool to approve as I do not believe Dr. Lao is in clinic or his baskets today.    Fanta Mayer RN    "

## 2024-07-02 RX ORDER — LETROZOLE 2.5 MG/1
7.5 TABLET, FILM COATED ORAL DAILY
Qty: 15 TABLET | Refills: 0 | Status: SHIPPED | OUTPATIENT
Start: 2024-07-02 | End: 2024-07-30

## 2024-07-30 ENCOUNTER — MYC REFILL (OUTPATIENT)
Dept: OBGYN | Facility: CLINIC | Age: 34
End: 2024-07-30
Payer: COMMERCIAL

## 2024-07-30 DIAGNOSIS — N97.9 FEMALE INFERTILITY: ICD-10-CM

## 2024-07-30 RX ORDER — LETROZOLE 2.5 MG/1
7.5 TABLET, FILM COATED ORAL DAILY
Qty: 15 TABLET | Refills: 0 | Status: SHIPPED | OUTPATIENT
Start: 2024-07-30

## 2024-07-30 NOTE — TELEPHONE ENCOUNTER
"Requested Prescriptions   Pending Prescriptions Disp Refills    letrozole (FEMARA) 2.5 MG tablet 15 tablet 0     Sig: Take 3 tablets (7.5 mg) by mouth daily       There is no refill protocol information for this order        Last seen: 5/09/2024  Last ordered: 7/02/2024 for 15 tabs & 0 refills     A/P notes copy/pasted below from last OV:     \"A/P: Limited vaginal ultrasound performed. Multiple follicles with good response to letrozole 7.5mg. Pt to use OPKs with anticipated ovulation in 5-7 days. Plan timed intercourse. Pt to follow up with HCG results. If negative, plan up to 3 cycles of letrozole 7.5mg. Consider moving to Infertility clinic if not successful after 3 rounds of current dose. All questions answered. Pt in agreement with plan of care\".     Unable to refill per protocol.  Routing to provider for eval & authorization.     Ceci KAPLAN RN -  OB/GYN group    "

## 2024-08-11 ENCOUNTER — MYC REFILL (OUTPATIENT)
Dept: FAMILY MEDICINE | Facility: CLINIC | Age: 34
End: 2024-08-11
Payer: COMMERCIAL

## 2024-08-11 DIAGNOSIS — F41.9 ANXIETY: ICD-10-CM

## 2024-08-13 RX ORDER — CITALOPRAM HYDROBROMIDE 20 MG/1
20 TABLET ORAL DAILY
Qty: 90 TABLET | Refills: 0 | Status: SHIPPED | OUTPATIENT
Start: 2024-08-13

## 2024-08-25 ENCOUNTER — PATIENT OUTREACH (OUTPATIENT)
Dept: CARE COORDINATION | Facility: CLINIC | Age: 34
End: 2024-08-25
Payer: COMMERCIAL

## 2024-09-12 ENCOUNTER — PATIENT OUTREACH (OUTPATIENT)
Dept: CARE COORDINATION | Facility: CLINIC | Age: 34
End: 2024-09-12
Payer: COMMERCIAL

## 2024-11-02 ENCOUNTER — MYC MEDICAL ADVICE (OUTPATIENT)
Dept: OBGYN | Facility: CLINIC | Age: 34
End: 2024-11-02
Payer: COMMERCIAL

## 2024-11-04 DIAGNOSIS — N97.9 FEMALE INFERTILITY: Primary | ICD-10-CM

## 2024-11-04 NOTE — TELEPHONE ENCOUNTER
Pt is asking for an HSG order.    Pt last saw Dr. Lao on 5/9/24 for infertility.    Pt is now seeing a fertility specialist for IUI.  She states that she is supposed to get an HSG done if this round of IUI is unsuccessful.      Pt is asking Dr. Lao to place an HSG order so she can get the HSG done with [facility] for insurance reasons.    Routing to Dr. Lao to place an HSG order for pt if appropriate.    Fanta Mayer, [facility]

## 2024-11-18 ENCOUNTER — MYC REFILL (OUTPATIENT)
Dept: FAMILY MEDICINE | Facility: CLINIC | Age: 34
End: 2024-11-18
Payer: COMMERCIAL

## 2024-11-18 DIAGNOSIS — Z31.69 ENCOUNTER FOR PRECONCEPTION CONSULTATION: ICD-10-CM

## 2024-11-18 DIAGNOSIS — F41.9 ANXIETY: ICD-10-CM

## 2024-11-18 RX ORDER — PRENATAL VIT/IRON FUM/FOLIC AC 27MG-0.8MG
1 TABLET ORAL DAILY
Qty: 90 TABLET | Refills: 3 | Status: SHIPPED | OUTPATIENT
Start: 2024-11-18

## 2024-11-18 RX ORDER — CITALOPRAM HYDROBROMIDE 20 MG/1
20 TABLET ORAL DAILY
Qty: 30 TABLET | Refills: 0 | Status: SHIPPED | OUTPATIENT
Start: 2024-11-18

## 2024-12-03 SDOH — HEALTH STABILITY: PHYSICAL HEALTH: ON AVERAGE, HOW MANY DAYS PER WEEK DO YOU ENGAGE IN MODERATE TO STRENUOUS EXERCISE (LIKE A BRISK WALK)?: 3 DAYS

## 2024-12-03 SDOH — HEALTH STABILITY: PHYSICAL HEALTH: ON AVERAGE, HOW MANY MINUTES DO YOU ENGAGE IN EXERCISE AT THIS LEVEL?: 30 MIN

## 2024-12-03 ASSESSMENT — SOCIAL DETERMINANTS OF HEALTH (SDOH): HOW OFTEN DO YOU GET TOGETHER WITH FRIENDS OR RELATIVES?: MORE THAN THREE TIMES A WEEK

## 2024-12-05 ENCOUNTER — OFFICE VISIT (OUTPATIENT)
Dept: FAMILY MEDICINE | Facility: CLINIC | Age: 34
End: 2024-12-05
Payer: COMMERCIAL

## 2024-12-05 VITALS
SYSTOLIC BLOOD PRESSURE: 128 MMHG | BODY MASS INDEX: 33.33 KG/M2 | OXYGEN SATURATION: 99 % | HEART RATE: 73 BPM | DIASTOLIC BLOOD PRESSURE: 78 MMHG | RESPIRATION RATE: 20 BRPM | HEIGHT: 69 IN | WEIGHT: 225 LBS

## 2024-12-05 DIAGNOSIS — E28.2 PCOS (POLYCYSTIC OVARIAN SYNDROME): ICD-10-CM

## 2024-12-05 DIAGNOSIS — R87.612 LGSIL ON PAP SMEAR OF CERVIX: ICD-10-CM

## 2024-12-05 DIAGNOSIS — F41.9 ANXIETY: ICD-10-CM

## 2024-12-05 DIAGNOSIS — Z00.00 ROUTINE GENERAL MEDICAL EXAMINATION AT A HEALTH CARE FACILITY: Primary | ICD-10-CM

## 2024-12-05 DIAGNOSIS — Z12.4 CERVICAL CANCER SCREENING: ICD-10-CM

## 2024-12-05 RX ORDER — CHORIOGONADOTROPIN ALFA 250 UG/.5ML
250 INJECTION, SOLUTION SUBCUTANEOUS ONCE
COMMUNITY
Start: 2024-10-31

## 2024-12-05 RX ORDER — CITALOPRAM HYDROBROMIDE 10 MG/1
10 TABLET ORAL DAILY
Qty: 30 TABLET | Refills: 1 | Status: SHIPPED | OUTPATIENT
Start: 2024-12-05

## 2024-12-05 RX ORDER — CITALOPRAM HYDROBROMIDE 20 MG/1
20 TABLET ORAL DAILY
Qty: 30 TABLET | Refills: 1 | Status: SHIPPED | OUTPATIENT
Start: 2024-12-05

## 2024-12-05 NOTE — PATIENT INSTRUCTIONS
Preventive Care Advice   This is general advice given by our system to help you stay healthy. However, your care team may have specific advice just for you. Please talk to your care team about your preventive care needs.  Nutrition  Eat 5 or more servings of fruits and vegetables each day.  Try wheat bread, brown rice and whole grain pasta (instead of white bread, rice, and pasta).  Get enough calcium and vitamin D. Check the label on foods and aim for 100% of the RDA (recommended daily allowance).  Lifestyle  Exercise at least 150 minutes each week  (30 minutes a day, 5 days a week).  Do muscle strengthening activities 2 days a week. These help control your weight and prevent disease.  No smoking.  Wear sunscreen to prevent skin cancer.  Have a dental exam and cleaning every 6 months.  Yearly exams  See your health care team every year to talk about:  Any changes in your health.  Any medicines your care team has prescribed.  Preventive care, family planning, and ways to prevent chronic diseases.  Shots (vaccines)   HPV shots (up to age 26), if you've never had them before.  Hepatitis B shots (up to age 59), if you've never had them before.  COVID-19 shot: Get this shot when it's due.  Flu shot: Get a flu shot every year.  Tetanus shot: Get a tetanus shot every 10 years.  Pneumococcal, hepatitis A, and RSV shots: Ask your care team if you need these based on your risk.  Shingles shot (for age 50 and up)  General health tests  Diabetes screening:  Starting at age 35, Get screened for diabetes at least every 3 years.  If you are younger than age 35, ask your care team if you should be screened for diabetes.  Cholesterol test: At age 39, start having a cholesterol test every 5 years, or more often if advised.  Bone density scan (DEXA): At age 50, ask your care team if you should have this scan for osteoporosis (brittle bones).  Hepatitis C: Get tested at least once in your life.  STIs (sexually transmitted  infections)  Before age 24: Ask your care team if you should be screened for STIs.  After age 24: Get screened for STIs if you're at risk. You are at risk for STIs (including HIV) if:  You are sexually active with more than one person.  You don't use condoms every time.  You or a partner was diagnosed with a sexually transmitted infection.  If you are at risk for HIV, ask about PrEP medicine to prevent HIV.  Get tested for HIV at least once in your life, whether you are at risk for HIV or not.  Cancer screening tests  Cervical cancer screening: If you have a cervix, begin getting regular cervical cancer screening tests starting at age 21.  Breast cancer scan (mammogram): If you've ever had breasts, begin having regular mammograms starting at age 40. This is a scan to check for breast cancer.  Colon cancer screening: It is important to start screening for colon cancer at age 45.  Have a colonoscopy test every 10 years (or more often if you're at risk) Or, ask your provider about stool tests like a FIT test every year or Cologuard test every 3 years.  To learn more about your testing options, visit:   .  For help making a decision, visit:   https://bit.ly/im64717.  Prostate cancer screening test: If you have a prostate, ask your care team if a prostate cancer screening test (PSA) at age 55 is right for you.  Lung cancer screening: If you are a current or former smoker ages 50 to 80, ask your care team if ongoing lung cancer screenings are right for you.  For informational purposes only. Not to replace the advice of your health care provider. Copyright   2023 [facility]. All rights reserved. Clinically reviewed by the [facility]. [employer] [id number] - REV 01/24.  Learning About Stress  What is stress?     Stress is your body's response to a hard situation. Your body can have a physical, emotional, or mental response. Stress is a fact of life for most people, and it affects  everyone differently. What causes stress for you may not be stressful for someone else.  A lot of things can cause stress. You may feel stress when you go on a job interview, take a test, or run a race. This kind of short-term stress is normal and even useful. It can help you if you need to work hard or react quickly. For example, stress can help you finish an important job on time.  Long-term stress is caused by ongoing stressful situations or events. Examples of long-term stress include long-term health problems, ongoing problems at work, or conflicts in your family. Long-term stress can harm your health.  How does stress affect your health?  When you are stressed, your body responds as though you are in danger. It makes hormones that speed up your heart, make you breathe faster, and give you a burst of energy. This is called the fight-or-flight stress response. If the stress is over quickly, your body goes back to normal and no harm is done.  But if stress happens too often or lasts too long, it can have bad effects. Long-term stress can make you more likely to get sick, and it can make symptoms of some diseases worse. If you tense up when you are stressed, you may develop neck, shoulder, or low back pain. Stress is linked to high blood pressure and heart disease.  Stress also harms your emotional health. It can make you mckinnon, tense, or depressed. Your relationships may suffer, and you may not do well at work or school.  What can you do to manage stress?  You can try these things to help manage stress:   Do something active. Exercise or activity can help reduce stress. Walking is a great way to get started. Even everyday activities such as housecleaning or yard work can help.  Try yoga or anaya chi. These techniques combine exercise and meditation. You may need some training at first to learn them.  Do something you enjoy. For example, listen to music or go to a movie. Practice your hobby or do volunteer  "work.  Meditate. This can help you relax, because you are not worrying about what happened before or what may happen in the future.  Do guided imagery. Imagine yourself in any setting that helps you feel calm. You can use online videos, books, or a teacher to guide you.  Do breathing exercises. For example:  From a standing position, bend forward from the waist with your knees slightly bent. Let your arms dangle close to the floor.  Breathe in slowly and deeply as you return to a standing position. Roll up slowly and lift your head last.  Hold your breath for just a few seconds in the standing position.  Breathe out slowly and bend forward from the waist.  Let your feelings out. Talk, laugh, cry, and express anger when you need to. Talking with supportive friends or family, a counselor, or a aden leader about your feelings is a healthy way to relieve stress. Avoid discussing your feelings with people who make you feel worse.  Write. It may help to write about things that are bothering you. This helps you find out how much stress you feel and what is causing it. When you know this, you can find better ways to cope.  What can you do to prevent stress?  You might try some of these things to help prevent stress:  Manage your time. This helps you find time to do the things you want and need to do.  Get enough sleep. Your body recovers from the stresses of the day while you are sleeping.  Get support. Your family, friends, and community can make a difference in how you experience stress.  Limit your news feed. Avoid or limit time on social media or news that may make you feel stressed.  Do something active. Exercise or activity can help reduce stress. Walking is a great way to get started.  Where can you learn more?  Go to https://www.[employer].net/patiented  Enter N032 in the search box to learn more about \"Learning About Stress.\"  Current as of: October 24, 2023  Content Version: 14.2 2024 Ayannah. "   Care instructions adapted under license by your healthcare professional. If you have questions about a medical condition or this instruction, always ask your healthcare professional. [employer] disclaims any warranty or liability for your use of this information.

## 2024-12-05 NOTE — PROGRESS NOTES
"Preventive Care Visit  [facility]  Christina Parker DO, Family Medicine  Dec 5, 2024      Assessment & Plan     Routine general medical examination at a health care facility  Vitals wnl.     LGSIL on Pap smear of cervix  Cervical cancer screening  - HPV and Gynecologic Cytology Panel - Recommended Age 30 - 65 Years    Anxiety  Having increased anxiety since going through infertility treatments. We discussed option to meet with a therapist and we will increase her celexa dose to 30 mg now and if in 4-6 weeks she would like to increase this to 40 mg she will reach out.   - Adult Mental Health  Referral; Future  - citalopram (CELEXA) 10 MG tablet; Take 1 tablet (10 mg) by mouth daily. With 1 tablet (20 mg) for total of 30 mg daily  - citalopram (CELEXA) 20 MG tablet; Take 1 tablet (20 mg) by mouth daily. With 1 tablet (20 mg) for total of 30 mg daily    PCOS (polycystic ovarian syndrome)  Discussed increase in weight with fertility treatments and PCOS now untreated (was on OCP and spironolactone). We discussed option to add on metformin to help manage PCOS. She will reach out to fertility clinic to see if this okay to start and reach out if desires starting.       Patient has been advised of split billing requirements and indicates understanding: Yes        BMI  Estimated body mass index is 32.95 kg/m  as calculated from the following:    Height as of this encounter: 1.76 m (5' 9.29\").    Weight as of this encounter: 102.1 kg (225 lb).   Weight management plan: Discussed healthy diet and exercise guidelines    Counseling  Appropriate preventive services were addressed with this patient via screening, questionnaire, or discussion as appropriate for fall prevention, nutrition, physical activity, Tobacco-use cessation, social engagement, weight loss and cognition.  Checklist reviewing preventive services available has been given to the patient.  Reviewed patient's diet, addressing " concerns and/or questions.   She is at risk for lack of exercise and has been provided with information to increase physical activity for the benefit of her well-being.   She is at risk for psychosocial distress and has been provided with information to reduce risk.           Jayme Goldstein is a 34 year old, presenting for the following:  Physical           HPI    Seeing fertility provider. Now doing second round of IUI. Started ovulation induction in January 2024 before the IUI.     Having anxiety for 2 weeks after hormones then 2 weeks of depression after having cycle. Has good support system. Not in therapy. Doing meditation. Has PCOS, was on spironolactone and OCP.   Weight has increased since stopping OCP and starting fertility treatment.   Trying to stay consistent with exercise - training for a 10 k ski race. Runs 5ks.   Eats overall pretty healthy. Vegetarian lunches one meal/day,       Lab Results   Component Value Date    A1C 5.4 01/02/2024           Health Care Directive  Patient does not have a Health Care Directive: Discussed advance care planning with patient; however, patient declined at this time.      12/3/2024   General Health   How would you rate your overall physical health? (!) FAIR   Feel stress (tense, anxious, or unable to sleep) To some extent      (!) STRESS CONCERN      12/3/2024   Nutrition   Three or more servings of calcium each day? Yes   Diet: Regular (no restrictions)   How many servings of fruit and vegetables per day? 4 or more   How many sweetened beverages each day? 0-1            12/3/2024   Exercise   Days per week of moderate/strenous exercise 3 days   Average minutes spent exercising at this level 30 min            12/3/2024   Social Factors   Frequency of gathering with friends or relatives More than three times a week   Worry food won't last until get money to buy more No   Food not last or not have enough money for food? No   Do you have housing? (Housing is defined as  stable permanent housing and does not include staying ouside in a car, in a tent, in an abandoned building, in an overnight shelter, or couch-surfing.) Yes   Are you worried about losing your housing? No   Lack of transportation? No   Unable to get utilities (heat,electricity)? No            12/3/2024   Dental   Dentist two times every year? Yes            11/13/2024   TB Screening   Were you born outside of the [place]? No              Today's PHQ-2 Score:       1/15/2024     7:36 AM   PHQ-2 ( 1999 [employer])   Q1: Little interest or pleasure in doing things 0    Q2: Feeling down, depressed or hopeless 0    PHQ-2 Score 0   Q1: Little interest or pleasure in doing things Not at all   Q2: Feeling down, depressed or hopeless Not at all   PHQ-2 Score 0       Patient-reported         12/3/2024   Substance Use   Alcohol more than 3/day or more than 7/wk No   Do you use any other substances recreationally? No        Social History     Tobacco Use    Smoking status: Never    Smokeless tobacco: Never   Vaping Use    Vaping status: Never Used   Substance Use Topics    Alcohol use: Yes     Comment: Socially on weekends    Drug use: No           3/20/2023   LAST FHS-7 RESULTS   1st degree relative breast or ovarian cancer No    Any relative bilateral breast cancer No    Any male have breast cancer No    Any ONE woman have BOTH breast AND ovarian cancer No    Any woman with breast cancer before 50yrs No    2 or more relatives with breast AND/OR ovarian cancer No    2 or more relatives with breast AND/OR bowel cancer Yes        Patient-reported        Mammogram Screening - Mammogram every 1-2 years updated in Health Maintenance based on mutual decision making        12/3/2024   STI Screening   New sexual partner(s) since last STI/HIV test? No        History of abnormal Pap smear: No - age 30- 64 PAP with HPV every 5 years recommended        Latest Ref Rng & Units 2/27/2023     9:11 AM 4/6/2022     7:17 AM 4/25/2019    11:45 AM   PAP /  HPV   PAP  Low-grade squamous intraepithelial lesion (LSIL) encompassing HPV/mild dysplasia/CIN1  Atypical squamous cells of undetermined significance (ASC-US)     PAP (Historical)    ASC-US    HPV 16 DNA Negative Negative  Negative     HPV 18 DNA Negative Negative  Negative     Other HR HPV Negative Negative  Negative             12/3/2024   Contraception/Family Planning   Questions about contraception or family planning No           Reviewed and updated as needed this visit by Provider                    Past Medical History:   Diagnosis Date    Abnormal Pap smear of cervix 2019    See problem list    Irregular menses     Menarche     age 15     Past Surgical History:   Procedure Laterality Date    EXCISE MASS BUTTOCK(S) Right 3/7/2019    Procedure: Excision of Right Buttocks Mass;  Surgeon: Carmelina Barr MD;  Location: [facility] OR    HERNIA REPAIR, INGUINAL RT/LT      Right    MOUTH SURGERY  2009    [place] teeth    SOFT TISSUE SURGERY  3/7/19    Mass excision R buttock     OB History    Para Term  AB Living   0 0 0 0 0 0   SAB IAB Ectopic Multiple Live Births   0 0 0 0 0     Lab work is in process  Labs reviewed in EPIC  BP Readings from Last 3 Encounters:   24 128/78   24 122/74   24 115/67    Wt Readings from Last 3 Encounters:   24 102.1 kg (225 lb)   24 96.8 kg (213 lb 4.8 oz)   24 97.8 kg (215 lb 8 oz)                  Patient Active Problem List   Diagnosis    Secondary amenorrhea    Nexplanon insertion    ASCUS of cervix with negative high risk HPV    Anxiety    Female hirsutism    Cervical radiculopathy    Cervicalgia    Bilateral hand numbness    Basilar invagination    Pain in joint, ankle and foot, left    LGSIL on Pap smear of cervix    PCOS (polycystic ovarian syndrome)     Past Surgical History:   Procedure Laterality Date    EXCISE MASS BUTTOCK(S) Right 3/7/2019    Procedure: Excision of Right Buttocks Mass;  Surgeon: Momo  "Carmelina Gill MD;  Location: [facility] OR    HERNIA REPAIR, INGUINAL RT/LT  1996    Right    MOUTH SURGERY  2009    [place] teeth    SOFT TISSUE SURGERY  3/7/19    Mass excision R buttock       Social History     Tobacco Use    Smoking status: Never    Smokeless tobacco: Never   Substance Use Topics    Alcohol use: Yes     Comment: Socially on weekends     Family History   Problem Relation Age of Onset    Hypertension Father     Anxiety Disorder Maternal Grandmother     Colon Cancer Maternal Grandfather     Cancer Maternal Grandfather         lung    Heart Disease Maternal Grandfather         valve replaced    Diabetes Paternal Grandmother     Arthritis Paternal Grandfather     Cancer Paternal Grandfather         skin    Breast Cancer Other     Colon Cancer Other          Current Outpatient Medications   Medication Sig Dispense Refill    citalopram (CELEXA) 10 MG tablet Take 1 tablet (10 mg) by mouth daily. With 1 tablet (20 mg) for total of 30 mg daily 30 tablet 1    citalopram (CELEXA) 20 MG tablet Take 1 tablet (20 mg) by mouth daily. With 1 tablet (20 mg) for total of 30 mg daily 30 tablet 1    OVIDREL 250 MCG/0.5ML injection Inject 250 mcg subcutaneously once.      letrozole (FEMARA) 2.5 MG tablet Take 3 tablets (7.5 mg) by mouth daily 15 tablet 0    Prenatal Vit-Fe Fumarate-FA (PRENATAL MULTIVITAMIN W/IRON) 27-0.8 MG tablet Take 1 tablet by mouth daily. 90 tablet 3     No Known Allergies      Review of Systems  Constitutional, HEENT, cardiovascular, pulmonary, gi and gu systems are negative, except as otherwise noted.     Objective    Exam  /78 (BP Location: Right arm, Cuff Size: Adult Large)   Pulse 73   Resp 20   Ht 1.76 m (5' 9.29\")   Wt 102.1 kg (225 lb)   LMP 11/11/2024   SpO2 99%   BMI 32.95 kg/m     Estimated body mass index is 32.95 kg/m  as calculated from the following:    Height as of this encounter: 1.76 m (5' 9.29\").    Weight as of this encounter: 102.1 kg (225 lb).    Physical " Exam  GENERAL: alert and no distress  EYES: Eyes grossly normal to inspection, PERRL and conjunctivae and sclerae normal  HENT: ear canals and TM's normal, nose and mouth without ulcers or lesions  NECK: no adenopathy, no asymmetry, masses, or scars  RESP: lungs clear to auscultation - no rales, rhonchi or wheezes  CV: regular rate and rhythm, normal S1 S2, no S3 or S4, no murmur, click or rub, no peripheral edema  ABDOMEN: soft, nontender, no hepatosplenomegaly, no masses and bowel sounds normal   (female) w/bimanual: normal female external genitalia, normal urethral meatus, normal vaginal mucosa, and normal cervix without masses or discharge  MS: no gross musculoskeletal defects noted, no edema  SKIN: no suspicious lesions or rashes  NEURO: Normal strength and tone, mentation intact and speech normal  PSYCH: mentation appears normal, affect normal/bright        Signed Electronically by: Christina Parker,

## 2024-12-11 LAB
BKR AP ASSOCIATED HPV REPORT: NORMAL
BKR LAB AP GYN ADEQUACY: NORMAL
BKR LAB AP GYN INTERPRETATION: NORMAL
BKR LAB AP LMP: NORMAL
BKR LAB AP PREVIOUS ABNL DX: NORMAL
BKR LAB AP PREVIOUS ABNORMAL: NORMAL
PATH REPORT.COMMENTS IMP SPEC: NORMAL
PATH REPORT.COMMENTS IMP SPEC: NORMAL
PATH REPORT.RELEVANT HX SPEC: NORMAL

## 2024-12-16 ENCOUNTER — MYC MEDICAL ADVICE (OUTPATIENT)
Dept: FAMILY MEDICINE | Facility: CLINIC | Age: 34
End: 2024-12-16
Payer: COMMERCIAL

## 2024-12-16 DIAGNOSIS — F41.9 ANXIETY: ICD-10-CM

## 2024-12-17 RX ORDER — CITALOPRAM HYDROBROMIDE 20 MG/1
20 TABLET ORAL DAILY
Qty: 90 TABLET | Refills: 3 | Status: SHIPPED | OUTPATIENT
Start: 2024-12-17

## 2024-12-17 RX ORDER — CITALOPRAM HYDROBROMIDE 10 MG/1
10 TABLET ORAL DAILY
Qty: 90 TABLET | Refills: 3 | Status: SHIPPED | OUTPATIENT
Start: 2024-12-17

## 2024-12-26 ENCOUNTER — ANCILLARY PROCEDURE (OUTPATIENT)
Dept: GENERAL RADIOLOGY | Facility: CLINIC | Age: 34
End: 2024-12-26
Attending: GENERAL PRACTICE
Payer: COMMERCIAL

## 2024-12-26 ENCOUNTER — LAB (OUTPATIENT)
Dept: LAB | Facility: CLINIC | Age: 34
End: 2024-12-26
Payer: COMMERCIAL

## 2024-12-26 DIAGNOSIS — N97.9 FEMALE INFERTILITY: ICD-10-CM

## 2024-12-26 LAB — HCG UR QL: NEGATIVE

## 2024-12-26 PROCEDURE — 74740 X-RAY FEMALE GENITAL TRACT: CPT

## 2024-12-26 PROCEDURE — 58340 CATHETER FOR HYSTEROGRAPHY: CPT

## 2025-02-26 ENCOUNTER — MYC MEDICAL ADVICE (OUTPATIENT)
Dept: FAMILY MEDICINE | Facility: CLINIC | Age: 35
End: 2025-02-26
Payer: COMMERCIAL

## 2025-02-27 NOTE — TELEPHONE ENCOUNTER
Routing mychart to PCP      Up to Date: Category D- consider therapy modification    Pt takes total of 30 citalopram        Maci, RN    Triage Nurse  [facility]

## 2025-05-08 ENCOUNTER — VIRTUAL VISIT (OUTPATIENT)
Dept: FAMILY MEDICINE | Facility: CLINIC | Age: 35
End: 2025-05-08
Payer: COMMERCIAL

## 2025-05-08 ENCOUNTER — LAB (OUTPATIENT)
Dept: LAB | Facility: CLINIC | Age: 35
End: 2025-05-08
Payer: COMMERCIAL

## 2025-05-08 DIAGNOSIS — E87.1 HYPONATREMIA: ICD-10-CM

## 2025-05-08 DIAGNOSIS — N17.9 AKI (ACUTE KIDNEY INJURY): ICD-10-CM

## 2025-05-08 DIAGNOSIS — N98.1 OVARIAN HYPERSTIMULATION SYNDROME: ICD-10-CM

## 2025-05-08 DIAGNOSIS — N98.1 OVARIAN HYPERSTIMULATION SYNDROME: Primary | ICD-10-CM

## 2025-05-08 LAB
ALBUMIN SERPL BCG-MCNC: 4.6 G/DL (ref 3.5–5.2)
ALBUMIN UR-MCNC: NEGATIVE MG/DL
ALP SERPL-CCNC: 93 U/L (ref 40–150)
ALT SERPL W P-5'-P-CCNC: 33 U/L (ref 0–50)
ANION GAP SERPL CALCULATED.3IONS-SCNC: 10 MMOL/L (ref 7–15)
APPEARANCE UR: CLEAR
AST SERPL W P-5'-P-CCNC: 21 U/L (ref 0–45)
BASOPHILS # BLD AUTO: 0.1 10E3/UL (ref 0–0.2)
BASOPHILS NFR BLD AUTO: 0 %
BILIRUB SERPL-MCNC: 0.2 MG/DL
BILIRUB UR QL STRIP: NEGATIVE
BUN SERPL-MCNC: 9.5 MG/DL (ref 6–20)
CALCIUM SERPL-MCNC: 9.6 MG/DL (ref 8.8–10.4)
CHLORIDE SERPL-SCNC: 103 MMOL/L (ref 98–107)
COLOR UR AUTO: COLORLESS
CREAT SERPL-MCNC: 0.81 MG/DL (ref 0.51–0.95)
EGFRCR SERPLBLD CKD-EPI 2021: >90 ML/MIN/1.73M2
EOSINOPHIL # BLD AUTO: 0.3 10E3/UL (ref 0–0.7)
EOSINOPHIL NFR BLD AUTO: 3 %
ERYTHROCYTE [DISTWIDTH] IN BLOOD BY AUTOMATED COUNT: 12 % (ref 10–15)
GLUCOSE SERPL-MCNC: 105 MG/DL (ref 70–99)
GLUCOSE UR STRIP-MCNC: NEGATIVE MG/DL
HCO3 SERPL-SCNC: 26 MMOL/L (ref 22–29)
HCT VFR BLD AUTO: 38.2 % (ref 35–47)
HGB BLD-MCNC: 12.7 G/DL (ref 11.7–15.7)
HGB UR QL STRIP: NEGATIVE
IMM GRANULOCYTES # BLD: 0.1 10E3/UL
IMM GRANULOCYTES NFR BLD: 1 %
KETONES UR STRIP-MCNC: NEGATIVE MG/DL
LEUKOCYTE ESTERASE UR QL STRIP: NEGATIVE
LYMPHOCYTES # BLD AUTO: 2.3 10E3/UL (ref 0.8–5.3)
LYMPHOCYTES NFR BLD AUTO: 20 %
MCH RBC QN AUTO: 29.2 PG (ref 26.5–33)
MCHC RBC AUTO-ENTMCNC: 33.2 G/DL (ref 31.5–36.5)
MCV RBC AUTO: 88 FL (ref 78–100)
MONOCYTES # BLD AUTO: 0.6 10E3/UL (ref 0–1.3)
MONOCYTES NFR BLD AUTO: 5 %
NEUTROPHILS # BLD AUTO: 8.1 10E3/UL (ref 1.6–8.3)
NEUTROPHILS NFR BLD AUTO: 71 %
NITRATE UR QL: NEGATIVE
NRBC # BLD AUTO: 0 10E3/UL
NRBC BLD AUTO-RTO: 0 /100
PH UR STRIP: 7 [PH] (ref 5–7)
PLATELET # BLD AUTO: 434 10E3/UL (ref 150–450)
POTASSIUM SERPL-SCNC: 3.9 MMOL/L (ref 3.4–5.3)
PROT SERPL-MCNC: 7.5 G/DL (ref 6.4–8.3)
RBC # BLD AUTO: 4.35 10E6/UL (ref 3.8–5.2)
SKIP: NORMAL
SODIUM SERPL-SCNC: 139 MMOL/L (ref 135–145)
SP GR UR STRIP: 1.01 (ref 1–1.03)
UROBILINOGEN UR STRIP-MCNC: NORMAL MG/DL
WBC # BLD AUTO: 11.4 10E3/UL (ref 4–11)

## 2025-05-08 NOTE — PROGRESS NOTES
Angelita is a 34 year old who is being evaluated via a billable video visit.    How would you like to obtain your AVS? MyChart  If the video visit is dropped, the invitation should be resent by: Text to cell phone: [phone]  Will anyone else be joining your video visit? No      Assessment & Plan     Ovarian hyperstimulation syndrome  Symptomatically improved. Vitals stable. No longer SOB. We will recheck labs. Follow up with fertility clinic as advised.   - CBC with platelets and differential; Future  - Comprehensive metabolic panel (BMP + Alb, Alk Phos, ALT, AST, Total. Bili, TP); Future  - UA Macroscopic with reflex to Microscopic and Culture - Lab Collect; Future    DAMARIS (acute kidney injury)  DAMARIS noted in hospital. Like pre-renal. Voiding well now. We will recheck labs.   - Comprehensive metabolic panel (BMP + Alb, Alk Phos, ALT, AST, Total. Bili, TP); Future  - UA Macroscopic with reflex to Microscopic and Culture - Lab Collect; Future    Hyponatremia  Due to hypovolemic hyponatremia. Symptomatically improved after paracentesis. We will check labs to confirm these have resolved.   - Comprehensive metabolic panel (BMP + Alb, Alk Phos, ALT, AST, Total. Bili, TP); Future          Subjective   Angelita is a 34 year old, presenting for the following health issues:  Hospital F/U    HPI      ED/UC Followup:    Facility:  [facility]  Date of visit: 04/25/2025  Reason for visit: abdominal pain and shortness of breath  Current Status:     Was given zofran    patient had an egg retrieval procedure 5 days before her ER visit.    Status Post U/S guided Paracentesis    CT for PE was ordered because the patient had an abnormal d-dimer.    Was feeling very SOB, WARNER, weight increasing, tachycardic, decreased urine output.   Paracentesis helped right away, immediate relief.   Continuously getting way better. Took 4-5 days to get rid of the rest of fluids. Lost 4-5 lb. Doesn't feel sick now but can  tell she was sick. Endorses more fatigue and sweating with exertion. Does have some weakness, but also wasn't able to lift/workout for awhile. Voiding normally.     The day after the ED she went to see her fertility doctor and they corie labs but there was an error in their lab.    Currently just on OCP. On an 8 week break until next steps.      On part - time duty currently. Back to full duty next week       CT PE CHEST W/ABD/PEL W CON   Final Result   IMPRESSION:   1. No CT evidence of pulmonary embolism.   2. Small left greater than right dependent pleural effusions with associated mild left greater than right compressive basilar atelectasis. No active pulmonary disease.   3. Enlarged multicystic appearance of both ovaries with suggestion of spoke wheel appearance and moderate ascites compatible with ovarian hyperstimulation syndrome. No increased attenuation of the ascites to suggest hemoperitoneum. No retroperitoneal or pelvic hematoma.   4. Small hiatal hernia.   5. No bowel obstruction or inflammation. Minor sigmoid diverticulosis.   6. Mild hepatomegaly. Otherwise normal appearing solid abdominal organs.           Review of Systems  Constitutional, HEENT, cardiovascular, pulmonary, gi and gu systems are negative, except as otherwise noted.      Objective           Vitals:  No vitals were obtained today due to virtual visit.    Physical Exam   GENERAL: alert and no distress  EYES: Eyes grossly normal to inspection.  No discharge or erythema, or obvious scleral/conjunctival abnormalities.  RESP: No audible wheeze, cough, or visible cyanosis.    SKIN: Visible skin clear. No significant rash, abnormal pigmentation or lesions.  NEURO: Cranial nerves grossly intact.  Mentation and speech appropriate for age.  PSYCH: Appropriate affect, tone, and pace of words          Video-Visit Details    Type of service:  Video Visit   Originating Location (pt. Location): Home    Distant Location (provider location):   On-site  Platform used for Video Visit: Sindi  Signed Electronically by: Christina Parker DO

## 2025-05-09 ENCOUNTER — RESULTS FOLLOW-UP (OUTPATIENT)
Dept: FAMILY MEDICINE | Facility: CLINIC | Age: 35
End: 2025-05-09

## (undated) DEVICE — SU VICRYL 4-0 P-3 18" UND  J494H

## (undated) DEVICE — SU VICRYL 3-0 SH 27" J316H

## (undated) DEVICE — PACK MINOR CUSTOM ASC

## (undated) DEVICE — ESU PENCIL SMOKE EVAC W/ROCKER SWITCH 0703-047-000

## (undated) DEVICE — LINEN TOWEL PACK X5 5464

## (undated) DEVICE — SOL NACL 0.9% IRRIG 1000ML BOTTLE 2F7124

## (undated) DEVICE — GLOVE PROTEXIS BLUE W/NEU-THERA 7.0  2D73EB70

## (undated) DEVICE — ESU GROUND PAD ADULT W/CORD E7507

## (undated) DEVICE — GLOVE PROTEXIS POWDER FREE SMT 6.5  2D72PT65X

## (undated) DEVICE — DRSG STERI STRIP 1/2X4" R1547

## (undated) DEVICE — DRSG GAUZE 4X4" TRAY 6939

## (undated) RX ORDER — PROPOFOL 10 MG/ML
INJECTION, EMULSION INTRAVENOUS
Status: DISPENSED
Start: 2019-03-07

## (undated) RX ORDER — ACETAMINOPHEN 325 MG/1
TABLET ORAL
Status: DISPENSED
Start: 2019-03-07

## (undated) RX ORDER — BUPIVACAINE HYDROCHLORIDE 5 MG/ML
INJECTION, SOLUTION EPIDURAL; INTRACAUDAL
Status: DISPENSED
Start: 2019-03-07

## (undated) RX ORDER — LIDOCAINE HYDROCHLORIDE AND EPINEPHRINE 10; 10 MG/ML; UG/ML
INJECTION, SOLUTION INFILTRATION; PERINEURAL
Status: DISPENSED
Start: 2019-03-07

## (undated) RX ORDER — FENTANYL CITRATE 50 UG/ML
INJECTION, SOLUTION INTRAMUSCULAR; INTRAVENOUS
Status: DISPENSED
Start: 2019-03-07

## (undated) RX ORDER — ONDANSETRON 2 MG/ML
INJECTION INTRAMUSCULAR; INTRAVENOUS
Status: DISPENSED
Start: 2019-03-07

## (undated) RX ORDER — LIDOCAINE HYDROCHLORIDE 20 MG/ML
INJECTION, SOLUTION EPIDURAL; INFILTRATION; INTRACAUDAL; PERINEURAL
Status: DISPENSED
Start: 2019-03-07

## (undated) RX ORDER — KETAMINE HYDROCHLORIDE 10 MG/ML
INJECTION INTRAMUSCULAR; INTRAVENOUS
Status: DISPENSED
Start: 2019-03-07

## (undated) RX ORDER — GABAPENTIN 300 MG/1
CAPSULE ORAL
Status: DISPENSED
Start: 2019-03-07